# Patient Record
Sex: MALE | Race: BLACK OR AFRICAN AMERICAN | Employment: OTHER | URBAN - METROPOLITAN AREA
[De-identification: names, ages, dates, MRNs, and addresses within clinical notes are randomized per-mention and may not be internally consistent; named-entity substitution may affect disease eponyms.]

---

## 2017-01-13 ENCOUNTER — HOME HEALTH ADMISSION (OUTPATIENT)
Dept: HOME HEALTH SERVICES | Facility: HOME HEALTH | Age: 82
End: 2017-01-13

## 2019-08-07 ENCOUNTER — APPOINTMENT (OUTPATIENT)
Dept: GENERAL RADIOLOGY | Age: 84
DRG: 291 | End: 2019-08-07
Attending: HOSPITALIST
Payer: MEDICARE

## 2019-08-07 ENCOUNTER — APPOINTMENT (OUTPATIENT)
Dept: NON INVASIVE DIAGNOSTICS | Age: 84
DRG: 291 | End: 2019-08-07
Attending: FAMILY MEDICINE
Payer: MEDICARE

## 2019-08-07 ENCOUNTER — APPOINTMENT (OUTPATIENT)
Dept: GENERAL RADIOLOGY | Age: 84
DRG: 291 | End: 2019-08-07
Attending: EMERGENCY MEDICINE
Payer: MEDICARE

## 2019-08-07 ENCOUNTER — HOSPITAL ENCOUNTER (INPATIENT)
Age: 84
LOS: 3 days | Discharge: NURSING, REHAB OR LONG-TERM CARE | DRG: 291 | End: 2019-08-10
Attending: EMERGENCY MEDICINE | Admitting: FAMILY MEDICINE
Payer: MEDICARE

## 2019-08-07 DIAGNOSIS — J44.1 ACUTE EXACERBATION OF CHRONIC OBSTRUCTIVE PULMONARY DISEASE (COPD) (HCC): ICD-10-CM

## 2019-08-07 DIAGNOSIS — J96.02 ACUTE RESPIRATORY FAILURE WITH HYPOXIA AND HYPERCAPNIA (HCC): ICD-10-CM

## 2019-08-07 DIAGNOSIS — I50.9 ACUTE ON CHRONIC CONGESTIVE HEART FAILURE, UNSPECIFIED HEART FAILURE TYPE (HCC): Primary | ICD-10-CM

## 2019-08-07 DIAGNOSIS — J18.9 PNEUMONIA DUE TO INFECTIOUS ORGANISM, UNSPECIFIED LATERALITY, UNSPECIFIED PART OF LUNG: ICD-10-CM

## 2019-08-07 DIAGNOSIS — J96.01 ACUTE RESPIRATORY FAILURE WITH HYPOXIA AND HYPERCAPNIA (HCC): ICD-10-CM

## 2019-08-07 PROBLEM — N28.9 RENAL INSUFFICIENCY: Status: ACTIVE | Noted: 2019-08-07

## 2019-08-07 PROBLEM — N28.9 RENAL INSUFFICIENCY: Status: RESOLVED | Noted: 2019-08-07 | Resolved: 2019-08-07

## 2019-08-07 PROBLEM — J96.90 RESPIRATORY FAILURE (HCC): Status: ACTIVE | Noted: 2019-08-07

## 2019-08-07 PROBLEM — N18.30 CKD (CHRONIC KIDNEY DISEASE) STAGE 3, GFR 30-59 ML/MIN (HCC): Status: ACTIVE | Noted: 2019-08-07

## 2019-08-07 PROBLEM — Z86.73 HISTORY OF CVA (CEREBROVASCULAR ACCIDENT): Status: ACTIVE | Noted: 2019-08-07

## 2019-08-07 PROBLEM — E11.9 TYPE II DIABETES MELLITUS (HCC): Status: ACTIVE | Noted: 2019-08-07

## 2019-08-07 PROBLEM — F03.90 DEMENTIA (HCC): Status: ACTIVE | Noted: 2019-08-07

## 2019-08-07 PROBLEM — N39.0 UTI (URINARY TRACT INFECTION): Status: ACTIVE | Noted: 2019-08-07

## 2019-08-07 PROBLEM — E11.9 TYPE II DIABETES MELLITUS (HCC): Status: RESOLVED | Noted: 2019-08-07 | Resolved: 2019-08-07

## 2019-08-07 LAB
ALBUMIN SERPL-MCNC: 2.6 G/DL (ref 3.4–5)
ALBUMIN/GLOB SERPL: 0.6 {RATIO} (ref 0.8–1.7)
ALP SERPL-CCNC: 109 U/L (ref 45–117)
ALT SERPL-CCNC: 15 U/L (ref 16–61)
ANION GAP SERPL CALC-SCNC: 4 MMOL/L (ref 3–18)
APPEARANCE UR: ABNORMAL
ARTERIAL PATENCY WRIST A: ABNORMAL
AST SERPL-CCNC: 18 U/L (ref 10–38)
ATRIAL RATE: 86 BPM
BACTERIA URNS QL MICRO: ABNORMAL /HPF
BASE EXCESS BLD CALC-SCNC: 5 MMOL/L
BASOPHILS # BLD: 0 K/UL (ref 0–0.1)
BASOPHILS NFR BLD: 0 % (ref 0–2)
BDY SITE: ABNORMAL
BILIRUB SERPL-MCNC: 0.5 MG/DL (ref 0.2–1)
BILIRUB UR QL: ABNORMAL
BNP SERPL-MCNC: 2088 PG/ML (ref 0–1800)
BODY TEMPERATURE: 96.1
BUN SERPL-MCNC: 38 MG/DL (ref 7–18)
BUN/CREAT SERPL: 19 (ref 12–20)
CALCIUM SERPL-MCNC: 8.5 MG/DL (ref 8.5–10.1)
CALCULATED P AXIS, ECG09: -7 DEGREES
CALCULATED R AXIS, ECG10: -24 DEGREES
CALCULATED T AXIS, ECG11: 125 DEGREES
CHLORIDE SERPL-SCNC: 111 MMOL/L (ref 100–111)
CK MB CFR SERPL CALC: 2 % (ref 0–4)
CK MB CFR SERPL CALC: 2.6 % (ref 0–4)
CK MB CFR SERPL CALC: 3.1 % (ref 0–4)
CK MB SERPL-MCNC: 1.1 NG/ML (ref 5–25)
CK MB SERPL-MCNC: 1.4 NG/ML (ref 5–25)
CK MB SERPL-MCNC: 1.7 NG/ML (ref 5–25)
CK SERPL-CCNC: 54 U/L (ref 39–308)
CK SERPL-CCNC: 55 U/L (ref 39–308)
CK SERPL-CCNC: 56 U/L (ref 39–308)
CO2 SERPL-SCNC: 31 MMOL/L (ref 21–32)
COLOR UR: ABNORMAL
CREAT SERPL-MCNC: 1.99 MG/DL (ref 0.6–1.3)
D DIMER PPP FEU-MCNC: 1.61 UG/ML(FEU)
DIAGNOSIS, 93000: NORMAL
DIFFERENTIAL METHOD BLD: ABNORMAL
ECHO AO ROOT DIAM: 4.28 CM
ECHO AV AREA PEAK VELOCITY: 7.6 CM2
ECHO AV AREA VTI: 3.9 CM2
ECHO AV AREA/BSA PEAK VELOCITY: 3.7 CM2/M2
ECHO AV AREA/BSA VTI: 1.9 CM2/M2
ECHO AV MEAN GRADIENT: 2.9 MMHG
ECHO AV PEAK GRADIENT: 1.3 MMHG
ECHO AV PEAK VELOCITY: 57.23 CM/S
ECHO AV VTI: 21.49 CM
ECHO IVC PROX: 1.79 CM
ECHO LA MAJOR AXIS: 4.38 CM
ECHO LA VOL 2C: 73.05 ML (ref 18–58)
ECHO LA VOL 4C: 29.43 ML (ref 18–58)
ECHO LA VOL BP: 51.27 ML (ref 18–58)
ECHO LA VOL/BSA BIPLANE: 25.25 ML/M2 (ref 16–28)
ECHO LA VOLUME INDEX A2C: 35.97 ML/M2 (ref 16–28)
ECHO LA VOLUME INDEX A4C: 14.49 ML/M2 (ref 16–28)
ECHO LV E' LATERAL VELOCITY: 21 CM/S
ECHO LV E' SEPTAL VELOCITY: 16 CM/S
ECHO LV EDV A2C: 81.2 ML
ECHO LV EDV A4C: 68.8 ML
ECHO LV EDV BP: 79.9 ML (ref 67–155)
ECHO LV EDV INDEX A4C: 33.9 ML/M2
ECHO LV EDV INDEX BP: 39.3 ML/M2
ECHO LV EDV NDEX A2C: 40 ML/M2
ECHO LV EJECTION FRACTION A2C: 47 %
ECHO LV EJECTION FRACTION A4C: 29 %
ECHO LV EJECTION FRACTION BIPLANE: 41.7 % (ref 55–100)
ECHO LV ESV A2C: 42.8 ML
ECHO LV ESV A4C: 48.9 ML
ECHO LV ESV BP: 46.6 ML (ref 22–58)
ECHO LV ESV INDEX A2C: 21.1 ML/M2
ECHO LV ESV INDEX A4C: 24.1 ML/M2
ECHO LV ESV INDEX BP: 22.9 ML/M2
ECHO LV INTERNAL DIMENSION DIASTOLIC: 4.03 CM (ref 4.2–5.9)
ECHO LV INTERNAL DIMENSION SYSTOLIC: 2.99 CM
ECHO LV IVSD: 1.15 CM (ref 0.6–1)
ECHO LV MASS 2D: 191.9 G (ref 88–224)
ECHO LV MASS INDEX 2D: 94.5 G/M2 (ref 49–115)
ECHO LV POSTERIOR WALL DIASTOLIC: 1.22 CM (ref 0.6–1)
ECHO LVOT DIAM: 2.54 CM
ECHO LVOT PEAK GRADIENT: 2.9 MMHG
ECHO LVOT PEAK VELOCITY: 85.45 CM/S
ECHO LVOT VTI: 16.54 CM
ECHO MV A VELOCITY: 62.35 CM/S
ECHO MV AREA PHT: 5.6 CM2
ECHO MV E DECELERATION TIME (DT): 136.5 MS
ECHO MV E VELOCITY: 141.3 CM/S
ECHO MV E/A RATIO: 2.27
ECHO MV E/E' LATERAL: 6.73
ECHO MV E/E' RATIO (AVERAGED): 7.78
ECHO MV E/E' SEPTAL: 8.83
ECHO MV PRESSURE HALF TIME (PHT): 39.6 MS
ECHO PV MAX VELOCITY: 131.55 CM/S
ECHO PV PEAK GRADIENT: 6.9 MMHG
ECHO RA AREA 4C: 12.98 CM2
EOSINOPHIL # BLD: 0.1 K/UL (ref 0–0.4)
EOSINOPHIL NFR BLD: 2 % (ref 0–5)
EPITH CASTS URNS QL MICRO: NEGATIVE /LPF (ref 0–5)
ERYTHROCYTE [DISTWIDTH] IN BLOOD BY AUTOMATED COUNT: 14.8 % (ref 11.6–14.5)
GAS FLOW.O2 O2 DELIVERY SYS: ABNORMAL L/MIN
GAS FLOW.O2 SETTING OXYMISER: 15 L/M
GLOBULIN SER CALC-MCNC: 4.4 G/DL (ref 2–4)
GLUCOSE BLD STRIP.AUTO-MCNC: 135 MG/DL (ref 70–110)
GLUCOSE BLD STRIP.AUTO-MCNC: 157 MG/DL (ref 70–110)
GLUCOSE BLD STRIP.AUTO-MCNC: 464 MG/DL (ref 70–110)
GLUCOSE SERPL-MCNC: 101 MG/DL (ref 74–99)
GLUCOSE UR STRIP.AUTO-MCNC: NEGATIVE MG/DL
HBA1C MFR BLD: 7.1 % (ref 4.2–5.6)
HCO3 BLD-SCNC: 30.6 MMOL/L (ref 22–26)
HCT VFR BLD AUTO: 31 % (ref 36–48)
HGB BLD-MCNC: 9.5 G/DL (ref 13–16)
HGB UR QL STRIP: ABNORMAL
KETONES UR QL STRIP.AUTO: NEGATIVE MG/DL
LACTATE BLD-SCNC: 0.51 MMOL/L (ref 0.4–2)
LEUKOCYTE ESTERASE UR QL STRIP.AUTO: ABNORMAL
LIPASE SERPL-CCNC: 52 U/L (ref 73–393)
LYMPHOCYTES # BLD: 0.8 K/UL (ref 0.9–3.6)
LYMPHOCYTES NFR BLD: 10 % (ref 21–52)
MAGNESIUM SERPL-MCNC: 2.3 MG/DL (ref 1.6–2.6)
MCH RBC QN AUTO: 27.9 PG (ref 24–34)
MCHC RBC AUTO-ENTMCNC: 30.6 G/DL (ref 31–37)
MCV RBC AUTO: 91.2 FL (ref 74–97)
MONOCYTES # BLD: 0.5 K/UL (ref 0.05–1.2)
MONOCYTES NFR BLD: 6 % (ref 3–10)
MUCOUS THREADS URNS QL MICRO: ABNORMAL /LPF
NEUTS SEG # BLD: 6.7 K/UL (ref 1.8–8)
NEUTS SEG NFR BLD: 82 % (ref 40–73)
NITRITE UR QL STRIP.AUTO: POSITIVE
O2/TOTAL GAS SETTING VFR VENT: 1 %
P-R INTERVAL, ECG05: 344 MS
PCO2 BLD: 54.6 MMHG (ref 35–45)
PH BLD: 7.35 [PH] (ref 7.35–7.45)
PH UR STRIP: 5.5 [PH] (ref 5–8)
PLATELET # BLD AUTO: 199 K/UL (ref 135–420)
PMV BLD AUTO: 11.2 FL (ref 9.2–11.8)
PO2 BLD: 301 MMHG (ref 80–100)
POTASSIUM SERPL-SCNC: 4.5 MMOL/L (ref 3.5–5.5)
PROT SERPL-MCNC: 7 G/DL (ref 6.4–8.2)
PROT UR STRIP-MCNC: >1000 MG/DL
Q-T INTERVAL, ECG07: 440 MS
QRS DURATION, ECG06: 150 MS
QTC CALCULATION (BEZET), ECG08: 526 MS
RBC # BLD AUTO: 3.4 M/UL (ref 4.7–5.5)
RBC #/AREA URNS HPF: ABNORMAL /HPF (ref 0–5)
SAO2 % BLD: 100 % (ref 92–97)
SERVICE CMNT-IMP: ABNORMAL
SODIUM SERPL-SCNC: 146 MMOL/L (ref 136–145)
SP GR UR REFRACTOMETRY: 1.02 (ref 1–1.03)
SPECIMEN TYPE: ABNORMAL
TOTAL RESP. RATE, ITRR: 26
TROPONIN I SERPL-MCNC: 0.05 NG/ML (ref 0–0.04)
TROPONIN I SERPL-MCNC: 0.06 NG/ML (ref 0–0.04)
TROPONIN I SERPL-MCNC: 0.11 NG/ML (ref 0–0.04)
UROBILINOGEN UR QL STRIP.AUTO: 1 EU/DL (ref 0.2–1)
VENTRICULAR RATE, ECG03: 86 BPM
WBC # BLD AUTO: 8.1 K/UL (ref 4.6–13.2)
WBC URNS QL MICRO: ABNORMAL /HPF (ref 0–5)

## 2019-08-07 PROCEDURE — 83880 ASSAY OF NATRIURETIC PEPTIDE: CPT

## 2019-08-07 PROCEDURE — 92610 EVALUATE SWALLOWING FUNCTION: CPT

## 2019-08-07 PROCEDURE — 74011000258 HC RX REV CODE- 258: Performed by: FAMILY MEDICINE

## 2019-08-07 PROCEDURE — 74011250636 HC RX REV CODE- 250/636: Performed by: EMERGENCY MEDICINE

## 2019-08-07 PROCEDURE — 71045 X-RAY EXAM CHEST 1 VIEW: CPT

## 2019-08-07 PROCEDURE — 74011250636 HC RX REV CODE- 250/636: Performed by: FAMILY MEDICINE

## 2019-08-07 PROCEDURE — 83605 ASSAY OF LACTIC ACID: CPT

## 2019-08-07 PROCEDURE — 93306 TTE W/DOPPLER COMPLETE: CPT

## 2019-08-07 PROCEDURE — 65660000000 HC RM CCU STEPDOWN

## 2019-08-07 PROCEDURE — 82962 GLUCOSE BLOOD TEST: CPT

## 2019-08-07 PROCEDURE — 74011000255 HC RX REV CODE- 255: Performed by: FAMILY MEDICINE

## 2019-08-07 PROCEDURE — 76450000000

## 2019-08-07 PROCEDURE — 74011000258 HC RX REV CODE- 258: Performed by: EMERGENCY MEDICINE

## 2019-08-07 PROCEDURE — 74011250637 HC RX REV CODE- 250/637: Performed by: EMERGENCY MEDICINE

## 2019-08-07 PROCEDURE — 83036 HEMOGLOBIN GLYCOSYLATED A1C: CPT

## 2019-08-07 PROCEDURE — 77010033678 HC OXYGEN DAILY

## 2019-08-07 PROCEDURE — 85025 COMPLETE CBC W/AUTO DIFF WBC: CPT

## 2019-08-07 PROCEDURE — 83690 ASSAY OF LIPASE: CPT

## 2019-08-07 PROCEDURE — 74011000250 HC RX REV CODE- 250: Performed by: FAMILY MEDICINE

## 2019-08-07 PROCEDURE — 74011250637 HC RX REV CODE- 250/637: Performed by: FAMILY MEDICINE

## 2019-08-07 PROCEDURE — 92611 MOTION FLUOROSCOPY/SWALLOW: CPT

## 2019-08-07 PROCEDURE — 83735 ASSAY OF MAGNESIUM: CPT

## 2019-08-07 PROCEDURE — 36600 WITHDRAWAL OF ARTERIAL BLOOD: CPT

## 2019-08-07 PROCEDURE — 77030013140 HC MSK NEB VYRM -A

## 2019-08-07 PROCEDURE — 74011000250 HC RX REV CODE- 250: Performed by: EMERGENCY MEDICINE

## 2019-08-07 PROCEDURE — 96374 THER/PROPH/DIAG INJ IV PUSH: CPT

## 2019-08-07 PROCEDURE — 87086 URINE CULTURE/COLONY COUNT: CPT

## 2019-08-07 PROCEDURE — 81001 URINALYSIS AUTO W/SCOPE: CPT

## 2019-08-07 PROCEDURE — 74011636637 HC RX REV CODE- 636/637: Performed by: FAMILY MEDICINE

## 2019-08-07 PROCEDURE — 77030035694 HC MSK BIPAP FLL FAC PERFMAX PHIL -B

## 2019-08-07 PROCEDURE — 51702 INSERT TEMP BLADDER CATH: CPT

## 2019-08-07 PROCEDURE — 99285 EMERGENCY DEPT VISIT HI MDM: CPT

## 2019-08-07 PROCEDURE — 82550 ASSAY OF CK (CPK): CPT

## 2019-08-07 PROCEDURE — 94660 CPAP INITIATION&MGMT: CPT

## 2019-08-07 PROCEDURE — 5A09357 ASSISTANCE WITH RESPIRATORY VENTILATION, LESS THAN 24 CONSECUTIVE HOURS, CONTINUOUS POSITIVE AIRWAY PRESSURE: ICD-10-PCS | Performed by: FAMILY MEDICINE

## 2019-08-07 PROCEDURE — 80053 COMPREHEN METABOLIC PANEL: CPT

## 2019-08-07 PROCEDURE — 77030034849

## 2019-08-07 PROCEDURE — 82803 BLOOD GASES ANY COMBINATION: CPT

## 2019-08-07 PROCEDURE — 87040 BLOOD CULTURE FOR BACTERIA: CPT

## 2019-08-07 PROCEDURE — 92526 ORAL FUNCTION THERAPY: CPT

## 2019-08-07 PROCEDURE — 85379 FIBRIN DEGRADATION QUANT: CPT

## 2019-08-07 PROCEDURE — 94640 AIRWAY INHALATION TREATMENT: CPT

## 2019-08-07 PROCEDURE — 36415 COLL VENOUS BLD VENIPUNCTURE: CPT

## 2019-08-07 PROCEDURE — 74230 X-RAY XM SWLNG FUNCJ C+: CPT

## 2019-08-07 PROCEDURE — 93005 ELECTROCARDIOGRAM TRACING: CPT

## 2019-08-07 RX ORDER — VANCOMYCIN/0.9 % SOD CHLORIDE 1.5G/250ML
1500 PLASTIC BAG, INJECTION (ML) INTRAVENOUS ONCE
Status: COMPLETED | OUTPATIENT
Start: 2019-08-07 | End: 2019-08-07

## 2019-08-07 RX ORDER — ACETAMINOPHEN 325 MG/1
325 TABLET ORAL
Status: DISCONTINUED | OUTPATIENT
Start: 2019-08-07 | End: 2019-08-10 | Stop reason: HOSPADM

## 2019-08-07 RX ORDER — LEVOFLOXACIN 5 MG/ML
750 INJECTION, SOLUTION INTRAVENOUS
Status: DISCONTINUED | OUTPATIENT
Start: 2019-08-09 | End: 2019-08-07

## 2019-08-07 RX ORDER — IPRATROPIUM BROMIDE AND ALBUTEROL SULFATE 2.5; .5 MG/3ML; MG/3ML
3 SOLUTION RESPIRATORY (INHALATION)
Status: COMPLETED | OUTPATIENT
Start: 2019-08-07 | End: 2019-08-07

## 2019-08-07 RX ORDER — CHOLECALCIFEROL (VITAMIN D3) 125 MCG
5 CAPSULE ORAL
Status: DISCONTINUED | OUTPATIENT
Start: 2019-08-07 | End: 2019-08-10 | Stop reason: HOSPADM

## 2019-08-07 RX ORDER — AMLODIPINE BESYLATE 5 MG/1
5 TABLET ORAL DAILY
COMMUNITY
End: 2019-10-30

## 2019-08-07 RX ORDER — HYDRALAZINE HYDROCHLORIDE 50 MG/1
50 TABLET, FILM COATED ORAL 3 TIMES DAILY
Status: DISCONTINUED | OUTPATIENT
Start: 2019-08-07 | End: 2019-08-10 | Stop reason: HOSPADM

## 2019-08-07 RX ORDER — MONTELUKAST SODIUM 10 MG/1
10 TABLET ORAL DAILY
COMMUNITY

## 2019-08-07 RX ORDER — ALBUTEROL SULFATE 0.83 MG/ML
SOLUTION RESPIRATORY (INHALATION)
COMMUNITY

## 2019-08-07 RX ORDER — MAGNESIUM SULFATE 100 %
16 CRYSTALS MISCELLANEOUS AS NEEDED
Status: DISCONTINUED | OUTPATIENT
Start: 2019-08-07 | End: 2019-08-10 | Stop reason: HOSPADM

## 2019-08-07 RX ORDER — FUROSEMIDE 10 MG/ML
40 INJECTION INTRAMUSCULAR; INTRAVENOUS 2 TIMES DAILY
Status: DISCONTINUED | OUTPATIENT
Start: 2019-08-07 | End: 2019-08-08

## 2019-08-07 RX ORDER — FUROSEMIDE 10 MG/ML
40 INJECTION INTRAMUSCULAR; INTRAVENOUS
Status: COMPLETED | OUTPATIENT
Start: 2019-08-07 | End: 2019-08-07

## 2019-08-07 RX ORDER — LEVOFLOXACIN 5 MG/ML
750 INJECTION, SOLUTION INTRAVENOUS ONCE
Status: COMPLETED | OUTPATIENT
Start: 2019-08-07 | End: 2019-08-07

## 2019-08-07 RX ORDER — ALBUTEROL SULFATE 2.5 MG/.5ML
5 SOLUTION RESPIRATORY (INHALATION)
Status: COMPLETED | OUTPATIENT
Start: 2019-08-07 | End: 2019-08-07

## 2019-08-07 RX ORDER — RANITIDINE 150 MG/1
150 TABLET, FILM COATED ORAL 2 TIMES DAILY
COMMUNITY

## 2019-08-07 RX ORDER — AMLODIPINE BESYLATE 5 MG/1
5 TABLET ORAL DAILY
Status: DISCONTINUED | OUTPATIENT
Start: 2019-08-07 | End: 2019-08-10 | Stop reason: HOSPADM

## 2019-08-07 RX ORDER — HYDRALAZINE HYDROCHLORIDE 25 MG/1
25 TABLET, FILM COATED ORAL 3 TIMES DAILY
Status: DISCONTINUED | OUTPATIENT
Start: 2019-08-07 | End: 2019-08-07

## 2019-08-07 RX ORDER — PREGABALIN 75 MG/1
75 CAPSULE ORAL
COMMUNITY

## 2019-08-07 RX ORDER — FERROUS SULFATE, DRIED 160(50) MG
1 TABLET, EXTENDED RELEASE ORAL
Status: DISCONTINUED | OUTPATIENT
Start: 2019-08-07 | End: 2019-08-10 | Stop reason: HOSPADM

## 2019-08-07 RX ORDER — INSULIN ASPART 100 [IU]/ML
INJECTION, SOLUTION INTRAVENOUS; SUBCUTANEOUS
COMMUNITY
End: 2019-10-30

## 2019-08-07 RX ORDER — MONTELUKAST SODIUM 10 MG/1
10 TABLET ORAL DAILY
Status: DISCONTINUED | OUTPATIENT
Start: 2019-08-07 | End: 2019-08-10 | Stop reason: HOSPADM

## 2019-08-07 RX ORDER — INSULIN GLARGINE 100 [IU]/ML
10 INJECTION, SOLUTION SUBCUTANEOUS
Status: DISCONTINUED | OUTPATIENT
Start: 2019-08-07 | End: 2019-08-08

## 2019-08-07 RX ORDER — FLUTICASONE FUROATE AND VILANTEROL 100; 25 UG/1; UG/1
1 POWDER RESPIRATORY (INHALATION) DAILY
Status: DISCONTINUED | OUTPATIENT
Start: 2019-08-07 | End: 2019-08-07

## 2019-08-07 RX ORDER — ATORVASTATIN CALCIUM 20 MG/1
40 TABLET, FILM COATED ORAL DAILY
Status: DISCONTINUED | OUTPATIENT
Start: 2019-08-07 | End: 2019-08-10 | Stop reason: HOSPADM

## 2019-08-07 RX ORDER — ALFUZOSIN HYDROCHLORIDE 10 MG/1
10 TABLET, EXTENDED RELEASE ORAL DAILY
Status: DISCONTINUED | OUTPATIENT
Start: 2019-08-07 | End: 2019-08-10 | Stop reason: HOSPADM

## 2019-08-07 RX ORDER — PIPERACILLIN SODIUM, TAZOBACTAM SODIUM 3; .375 G/15ML; G/15ML
3.38 INJECTION, POWDER, LYOPHILIZED, FOR SOLUTION INTRAVENOUS
Status: DISCONTINUED | OUTPATIENT
Start: 2019-08-07 | End: 2019-08-07

## 2019-08-07 RX ORDER — DEXTROMETHORPHAN HYDROBROMIDE, GUAIFENESIN 5; 100 MG/5ML; MG/5ML
650 LIQUID ORAL EVERY 8 HOURS
COMMUNITY

## 2019-08-07 RX ORDER — BRIMONIDINE TARTRATE 2 MG/ML
1 SOLUTION/ DROPS OPHTHALMIC 2 TIMES DAILY
Status: DISCONTINUED | OUTPATIENT
Start: 2019-08-07 | End: 2019-08-10 | Stop reason: HOSPADM

## 2019-08-07 RX ORDER — ALFUZOSIN HYDROCHLORIDE 10 MG/1
TABLET, EXTENDED RELEASE ORAL DAILY
COMMUNITY

## 2019-08-07 RX ORDER — ALBUTEROL SULFATE 2.5 MG/.5ML
2.5 SOLUTION RESPIRATORY (INHALATION)
Status: DISCONTINUED | OUTPATIENT
Start: 2019-08-07 | End: 2019-08-10 | Stop reason: HOSPADM

## 2019-08-07 RX ORDER — ISOSORBIDE MONONITRATE 30 MG/1
60 TABLET, EXTENDED RELEASE ORAL DAILY
Status: DISCONTINUED | OUTPATIENT
Start: 2019-08-07 | End: 2019-08-10 | Stop reason: HOSPADM

## 2019-08-07 RX ORDER — HYDRALAZINE HYDROCHLORIDE 50 MG/1
25 TABLET, FILM COATED ORAL 3 TIMES DAILY
COMMUNITY

## 2019-08-07 RX ORDER — GUAIFENESIN 100 MG/5ML
324 LIQUID (ML) ORAL
Status: COMPLETED | OUTPATIENT
Start: 2019-08-07 | End: 2019-08-07

## 2019-08-07 RX ORDER — INSULIN LISPRO 100 [IU]/ML
INJECTION, SOLUTION INTRAVENOUS; SUBCUTANEOUS
Status: DISCONTINUED | OUTPATIENT
Start: 2019-08-07 | End: 2019-08-10 | Stop reason: HOSPADM

## 2019-08-07 RX ORDER — PREGABALIN 75 MG/1
75 CAPSULE ORAL DAILY
Status: DISCONTINUED | OUTPATIENT
Start: 2019-08-07 | End: 2019-08-10 | Stop reason: HOSPADM

## 2019-08-07 RX ORDER — VANCOMYCIN/0.9 % SOD CHLORIDE 1.5G/250ML
1500 PLASTIC BAG, INJECTION (ML) INTRAVENOUS
Status: DISCONTINUED | OUTPATIENT
Start: 2019-08-08 | End: 2019-08-07

## 2019-08-07 RX ORDER — RANITIDINE 150 MG/1
150 TABLET, FILM COATED ORAL 2 TIMES DAILY
Status: DISCONTINUED | OUTPATIENT
Start: 2019-08-07 | End: 2019-08-10 | Stop reason: HOSPADM

## 2019-08-07 RX ORDER — ATORVASTATIN CALCIUM 40 MG/1
40 TABLET, FILM COATED ORAL DAILY
COMMUNITY

## 2019-08-07 RX ADMIN — PIPERACILLIN SODIUM,TAZOBACTAM SODIUM 3.38 G: 3; .375 INJECTION, POWDER, FOR SOLUTION INTRAVENOUS at 18:40

## 2019-08-07 RX ADMIN — PIPERACILLIN SODIUM,TAZOBACTAM SODIUM 3.38 G: 3; .375 INJECTION, POWDER, FOR SOLUTION INTRAVENOUS at 21:30

## 2019-08-07 RX ADMIN — PIPERACILLIN SODIUM,TAZOBACTAM SODIUM 3.38 G: 3; .375 INJECTION, POWDER, FOR SOLUTION INTRAVENOUS at 10:55

## 2019-08-07 RX ADMIN — PIPERACILLIN SODIUM,TAZOBACTAM SODIUM 3.38 G: 3; .375 INJECTION, POWDER, FOR SOLUTION INTRAVENOUS at 05:34

## 2019-08-07 RX ADMIN — BRIMONIDINE TARTRATE 1 DROP: 2 SOLUTION OPHTHALMIC at 08:34

## 2019-08-07 RX ADMIN — ISOSORBIDE MONONITRATE 60 MG: 30 TABLET, EXTENDED RELEASE ORAL at 09:00

## 2019-08-07 RX ADMIN — ASPIRIN 81 MG 324 MG: 81 TABLET ORAL at 06:04

## 2019-08-07 RX ADMIN — BRIMONIDINE TARTRATE 1 DROP: 2 SOLUTION OPHTHALMIC at 21:30

## 2019-08-07 RX ADMIN — APIXABAN 2.5 MG: 2.5 TABLET, FILM COATED ORAL at 09:00

## 2019-08-07 RX ADMIN — BARIUM SULFATE 80 G: 960 POWDER, FOR SUSPENSION ORAL at 12:33

## 2019-08-07 RX ADMIN — FUROSEMIDE 40 MG: 10 INJECTION, SOLUTION INTRAMUSCULAR; INTRAVENOUS at 05:27

## 2019-08-07 RX ADMIN — LEVOFLOXACIN 750 MG: 5 INJECTION, SOLUTION INTRAVENOUS at 08:24

## 2019-08-07 RX ADMIN — VANCOMYCIN HYDROCHLORIDE 1500 MG: 10 INJECTION, POWDER, LYOPHILIZED, FOR SOLUTION INTRAVENOUS at 05:59

## 2019-08-07 RX ADMIN — IPRATROPIUM BROMIDE AND ALBUTEROL SULFATE 3 ML: .5; 3 SOLUTION RESPIRATORY (INHALATION) at 04:50

## 2019-08-07 RX ADMIN — Medication 5 MG: at 21:31

## 2019-08-07 RX ADMIN — RANITIDINE 150 MG: 150 TABLET ORAL at 21:31

## 2019-08-07 RX ADMIN — BARIUM SULFATE 40 ML: 400 SUSPENSION ORAL at 12:33

## 2019-08-07 RX ADMIN — PREGABALIN 75 MG: 75 CAPSULE ORAL at 09:00

## 2019-08-07 RX ADMIN — AMLODIPINE BESYLATE 5 MG: 5 TABLET ORAL at 11:08

## 2019-08-07 RX ADMIN — INSULIN LISPRO 10 UNITS: 100 INJECTION, SOLUTION INTRAVENOUS; SUBCUTANEOUS at 21:31

## 2019-08-07 RX ADMIN — FUROSEMIDE 40 MG: 10 INJECTION, SOLUTION INTRAVENOUS at 18:40

## 2019-08-07 RX ADMIN — HYDRALAZINE HYDROCHLORIDE 50 MG: 50 TABLET, FILM COATED ORAL at 18:40

## 2019-08-07 RX ADMIN — FLUTICASONE FUROATE AND VILANTEROL TRIFENATATE 1 PUFF: 100; 25 POWDER RESPIRATORY (INHALATION) at 08:34

## 2019-08-07 RX ADMIN — APIXABAN 2.5 MG: 2.5 TABLET, FILM COATED ORAL at 21:31

## 2019-08-07 RX ADMIN — HYDRALAZINE HYDROCHLORIDE 50 MG: 50 TABLET, FILM COATED ORAL at 21:31

## 2019-08-07 RX ADMIN — ALBUTEROL SULFATE 5 MG: 2.5 SOLUTION RESPIRATORY (INHALATION) at 04:49

## 2019-08-07 RX ADMIN — METHYLPREDNISOLONE SODIUM SUCCINATE 125 MG: 125 INJECTION, POWDER, FOR SOLUTION INTRAMUSCULAR; INTRAVENOUS at 04:50

## 2019-08-07 RX ADMIN — INSULIN GLARGINE 10 UNITS: 100 INJECTION, SOLUTION SUBCUTANEOUS at 21:40

## 2019-08-07 NOTE — PROGRESS NOTES
Zosyn (Piperacillin/Tazobactam) Extended Infusion    Ezekiel Roman, a 81 y.o. yo male, has been converted to an extended infusion of Zosyn while in the intensive care unit. A loading dose of 3.375 or 4.5 gm will be given over 30 minutes depending on indication. Extended infusions will begin 4 hours after the initial dose if CrCl  >/= 20 ml/min or 8 hours after the initial dose if CrCl < 20 ml/min. Extended infusions will run over 4 hours (240 minutes).     Recent Labs     08/07/19 0435   CREA 1.99*     Ht Readings from Last 1 Encounters:   08/07/19 172.7 cm (68\")       Wt Readings from Last 1 Encounters:   08/07/19 89.4 kg (197 lb)         CrCl : Serum creatinine: 1.99 mg/dL (H) 08/07/19 0435  Estimated creatinine clearance: 31.1 mL/min (A)    Renal adjustment of extended infusion of Zosyn  3.375 or 4.5 gm every 8 hours for CrCl >/= 20 ml/min  3.375 or 4.5 gm every 12 hours for CrCl < 20 ml/min, intermittent HD or PD

## 2019-08-07 NOTE — CDMP QUERY
Patient admitted with SOB /noted to have COPD. If possible, please document in progress notes and d/c summary if you are evaluating and /or treating any of the following: 
 
? COPD exacerbation ? Chronic obstructive asthma with or without status asthmaticus (asthma with COPD) ? Chronic obstructive bronchitis ? Emphysema 
? Other (please specify) ? Unable to determine The medical record reflects the following: 
  Risk Factors: COPD Clinical Indicators: Wheezing , SOB , resp distress. Treatment: duo-nebs, solu-medrol, levaquin Thank-You Umang Mathis RN [address] [place] [address] [phone]

## 2019-08-07 NOTE — DIABETES MGMT
GLYCEMIC CONTROL PROGRESS NOTE:    - known h/o T2DM, HbA1C within recommended range for age + comorbids on basal/MTI SNF regimen  - BG in target range ICU: 140-180 mg/dL  - POCT + - Humalog Normal Insulin Sensitivity Corrective Coverage orders in place recommend continue    Recent Glucose Results:   Lab Results   Component Value Date/Time     (H) 08/07/2019 04:35 AM    GLUCPOC 157 (H) 08/07/2019 11:34 AM    GLUCPOC 135 (H) 08/07/2019 08:27 AM     Che Anderson MS, RN, CDE  Glycemic Control Team  [phone]  Pager [phone] (M-TH 8:00-4:30P)  *After Hours pager [phone]

## 2019-08-07 NOTE — PROGRESS NOTES
0715: Verbal shift change report given to Horacio Avalos (oncoming nurse) by Mariel Miller (offgoing nurse). Report included the following information SBAR, Kardex, ED Summary, Intake/Output, MAR, Recent Results and Cardiac Rhythm NSR w BBB and 1DAVB. 0800: shift assessment completed. See flowsheets. Patient lethargic and oriented x3. Confused regarding date. Intermittent confusion noted. L sided weakness and swelling. Pulses palpable x4. Scrotal edema noted, elevated. Lung sounds with scattered wheezing noted. On 2L NC. HR NSR with a 1st degree heart block and a BBB. ABD benign. Hernandez with orange urine. Resting at this time. Will continue to monitor. 0830: Patient with coughing noted during nursing bedside swallow test on water. Pills held at this time. SLP consult placed. 1000: Patient too lethargic at this time for SLP. Wakes up then falls right back to sleep. Pills still on hold at this time. May need modified swallow d/t hx of dysphasia. 1030: Bed assignment received for 346.   1130: Report given.

## 2019-08-07 NOTE — PROGRESS NOTES
0550-Report received from BRUNO Diaz RN. Sourav, mar, labs, kardex, ED summary and patient status reviewed. Pt to be transferred to ICU 8. 0639-Pt received on 2L/NC. Inspiratory/expiratory wheezes ausculted throughout upper lung fields, diminished bases. Pt is alert and oriented to self. Oriented to room and call bell. CHG Bath completed. Vancomycin currently running, will start Levofloxacin when completed. 0715-Bedside verbal report given to KIKO Thapa RN. Sourav, mar, labs, kardex and patient status reviewed.

## 2019-08-07 NOTE — PROGRESS NOTES
Pharmacy Dosing Services:  Zosyn ( Floor Dosing )    Indication:  Pneumonia ( HAP )    Previous Regimen Zosyn 3.375 gm IV ( over 4 hrs ) q8hrs   Serum Creatinine Lab Results   Component Value Date/Time    Creatinine 1.99 (H) 08/07/2019 04:35 AM      Creatinine Clearance Estimated Creatinine Clearance: 31.1 mL/min (A) (based on SCr of 1.99 mg/dL (H)). BUN Lab Results   Component Value Date/Time    BUN 38 (H) 08/07/2019 04:35 AM         Dose administration notes:   Changed to Zosyn 3.375 gm IV ( over 30 minutes ) q6hrs    Plan:  Continue to monitor     Amanda Licea [facility]   [phone]

## 2019-08-07 NOTE — ED PROVIDER NOTES
EMERGENCY DEPARTMENT HISTORY AND PHYSICAL EXAM    Date: 8/7/2019  Patient Name: Alfredo Horvath    History of Presenting Illness     Chief Complaint   Patient presents with    Respiratory Distress         History Provided By: EMS    Chief Complaint: Shortness of breath respiratory distress  Duration: Hours  Timing:  Acute  Location: chest  Quality: N/A-Patient has no complaints of pain  Severity: Severe  Modifying Factors: No aggravating or alleviating factors  Associated Symptoms: denies any other associated signs or symptoms    Additional History (Context):   4:32 AM  Alfredo Horvath is a 81 y.o. male with PMHX of DM, CVA who presents to the emergency department C/O shortness of breath. Associated sxs include hypoxemia. Pt denies pain, and any other sxs or complaints. Patient is a resident of [address] [place] [address] in [place]. He was found by nursing staff at 3:40 AM this morning in respiratory distress. He was given a nebulizer noted to have low pulse oximetry and EMS was called. PCP: Ashley Ontiveros MD    Current Facility-Administered Medications   Medication Dose Route Frequency Provider Last Rate Last Dose    levoFLOXacin (LEVAQUIN) 750 mg in D5W IVPB  750 mg IntraVENous ONCE Olivia Khalil MD        piperacillin-tazobactam (ZOSYN) 3.375 g in 0.9% sodium chloride (MBP/ADV) 100 mL MBP  3.375 g IntraVENous NOW Olivia Khalil  mL/hr at 08/07/19 0534 3.375 g at 08/07/19 0534    vancomycin (VANCOCIN) 1500 mg in  ml infusion  1,500 mg IntraVENous ONCE Olivia Khalil MD         Current Outpatient Medications   Medication Sig Dispense Refill    albuterol (PROVENTIL VENTOLIN) 2.5 mg /3 mL (0.083 %) nebu by Nebulization route.  alfuzosin SR (UROXATRAL) 10 mg SR tablet Take  by mouth daily.  amLODIPine (NORVASC) 5 mg tablet Take 5 mg by mouth daily.  atorvastatin (LIPITOR) 40 mg tablet Take 40 mg by mouth daily.       apixaban (ELIQUIS) 2.5 mg tablet Take 2.5 mg by mouth two (2) times a day.  hydrALAZINE (APRESOLINE) 50 mg tablet Take 25 mg by mouth three (3) times daily.  pregabalin (LYRICA) 75 mg capsule Take 75 mg by mouth.  insulin aspart U-100 (NOVOLOG) 100 unit/mL injection by SubCUTAneous route Before breakfast, lunch, dinner and at bedtime.  raNITIdine (ZANTAC) 150 mg tablet Take 150 mg by mouth two (2) times a day.  montelukast (SINGULAIR) 10 mg tablet Take 10 mg by mouth daily.  insulin glargine U-300 conc (TOUJEO SOLOSTAR U-300 INSULIN) 300 unit/mL (1.5 mL) inpn pen by SubCUTAneous route daily.  acetaminophen (TYLENOL) 650 mg TbER Take 650 mg by mouth every eight (8) hours. Past History     Past Medical History:  Past Medical History:   Diagnosis Date    CAD (coronary artery disease)     Chronic kidney disease     Dementia     Diabetes ([facility] [address]     Hypertension     Stroke [facility])        Past Surgical History:  History reviewed. No pertinent surgical history. Family History:  History reviewed. No pertinent family history. Social History:  Social History     Tobacco Use    Smoking status: Unknown If Ever Smoked   Substance Use Topics    Alcohol use: Not Currently    Drug use: Not on file       Allergies: Allergies   Allergen Reactions    [place] Juice Unknown (comments)    Pork Derived (Porcine) Unknown (comments)    Tomato Unknown (comments)     Pt unable to answer, dementia           Review of Systems   Review of Systems   Unable to perform ROS: Dementia   Constitutional: Negative for chills and fever. HENT: Negative for congestion and sore throat. Eyes: Negative for pain and redness. Respiratory: Positive for shortness of breath. Negative for cough. Cardiovascular: Positive for leg swelling. Negative for chest pain and palpitations. Gastrointestinal: Negative for abdominal pain, diarrhea, nausea and vomiting. Endocrine: Negative for polydipsia and polyuria.    Genitourinary: Negative for difficulty urinating and dysuria. Musculoskeletal: Negative for back pain and neck pain. Skin: Negative for pallor and rash. Neurological: Positive for weakness. Negative for headaches. All other systems reviewed and are negative. Physical Exam     Vitals:    08/07/19 0450 08/07/19 0454 08/07/19 0455 08/07/19 0527   BP:    158/73   Pulse:    86   Resp:       Temp:       SpO2: 100% 100% 100%    Weight:       Height:         Physical Exam   Constitutional: He appears well-developed and well-nourished. He appears distressed. HENT:   Head: Normocephalic and atraumatic. Right Ear: External ear normal.   Left Ear: External ear normal.   Nose: Nose normal.   Mouth/Throat: Oropharynx is clear and moist.   Eyes: Pupils are equal, round, and reactive to light. Conjunctivae and EOM are normal.   Neck: Normal range of motion. Neck supple. No JVD present. No tracheal deviation present. No thyromegaly present. Cardiovascular: Normal rate, regular rhythm, normal heart sounds and intact distal pulses. Exam reveals no gallop and no friction rub. No murmur heard. Pulmonary/Chest: He is in respiratory distress. He has wheezes. He has rales. Bilateral rails and wheezing   Abdominal: Soft. Bowel sounds are normal. He exhibits no distension and no mass. There is no tenderness. There is no rebound and no guarding. Musculoskeletal: He exhibits edema (2+ bilateral extremity pitting edema). Neurological: He is alert. He has normal reflexes. No cranial nerve deficit. Left hemiparesis   Skin: Skin is warm and dry. No rash noted. Psychiatric: He has a normal mood and affect. His behavior is normal.   Nursing note and vitals reviewed.         Diagnostic Study Results     Labs -     Recent Results (from the past 24 hour(s))   POC LACTIC ACID    Collection Time: 08/07/19  4:34 AM   Result Value Ref Range    Lactic Acid (POC) 0.51 0.40 - 0.41 mmol/L   METABOLIC PANEL, COMPREHENSIVE    Collection Time: 08/07/19  4:35 AM   Result Value Ref Range    Sodium 146 (H) 136 - 145 mmol/L    Potassium 4.5 3.5 - 5.5 mmol/L    Chloride 111 100 - 111 mmol/L    CO2 31 21 - 32 mmol/L    Anion gap 4 3.0 - 18 mmol/L    Glucose 101 (H) 74 - 99 mg/dL    BUN 38 (H) 7.0 - 18 MG/DL    Creatinine 1.99 (H) 0.6 - 1.3 MG/DL    BUN/Creatinine ratio 19 12 - 20      GFR est AA 39 (L) >60 ml/min/1.73m2    GFR est non-AA 32 (L) >60 ml/min/1.73m2    Calcium 8.5 8.5 - 10.1 MG/DL    Bilirubin, total 0.5 0.2 - 1.0 MG/DL    ALT (SGPT) 15 (L) 16 - 61 U/L    AST (SGOT) 18 10 - 38 U/L    Alk. phosphatase 109 45 - 117 U/L    Protein, total 7.0 6.4 - 8.2 g/dL    Albumin 2.6 (L) 3.4 - 5.0 g/dL    Globulin 4.4 (H) 2.0 - 4.0 g/dL    A-G Ratio 0.6 (L) 0.8 - 1.7     CBC WITH AUTOMATED DIFF    Collection Time: 08/07/19  4:35 AM   Result Value Ref Range    WBC 8.1 4.6 - 13.2 K/uL    RBC 3.40 (L) 4.70 - 5.50 M/uL    HGB 9.5 (L) 13.0 - 16.0 g/dL    HCT 31.0 (L) 36.0 - 48.0 %    MCV 91.2 74.0 - 97.0 FL    MCH 27.9 24.0 - 34.0 PG    MCHC 30.6 (L) 31.0 - 37.0 g/dL    RDW 14.8 (H) 11.6 - 14.5 %    PLATELET 220 247 - 108 K/uL    MPV 11.2 9.2 - 11.8 FL    NEUTROPHILS 82 (H) 40 - 73 %    LYMPHOCYTES 10 (L) 21 - 52 %    MONOCYTES 6 3 - 10 %    EOSINOPHILS 2 0 - 5 %    BASOPHILS 0 0 - 2 %    ABS. NEUTROPHILS 6.7 1.8 - 8.0 K/UL    ABS. LYMPHOCYTES 0.8 (L) 0.9 - 3.6 K/UL    ABS. MONOCYTES 0.5 0.05 - 1.2 K/UL    ABS. EOSINOPHILS 0.1 0.0 - 0.4 K/UL    ABS.  BASOPHILS 0.0 0.0 - 0.1 K/UL    DF AUTOMATED     NT-PRO BNP    Collection Time: 08/07/19  4:35 AM   Result Value Ref Range    NT pro-BNP 2,088 (H) 0 - 1,800 PG/ML   D DIMER    Collection Time: 08/07/19  4:35 AM   Result Value Ref Range    D DIMER 1.61 (H) <0.46 ug/ml(FEU)   LIPASE    Collection Time: 08/07/19  4:35 AM   Result Value Ref Range    Lipase 52 (L) 73 - 393 U/L   MAGNESIUM    Collection Time: 08/07/19  4:35 AM   Result Value Ref Range    Magnesium 2.3 1.6 - 2.6 mg/dL   CARDIAC PANEL,(CK, CKMB & TROPONIN)    Collection Time: 08/07/19  4:35 AM Result Value Ref Range    CK 56 39 - 308 U/L    CK - MB 1.1 <3.6 ng/ml    CK-MB Index 2.0 0.0 - 4.0 %    Troponin-I, QT 0.05 (H) 0.0 - 0.045 NG/ML   POC G3    Collection Time: 08/07/19  4:41 AM   Result Value Ref Range    Device: Non rebreather      Flow rate (POC) 15 L/M    FIO2 (POC) 1.0 %    pH (POC) 7.351 7.35 - 7.45      pCO2 (POC) 54.6 (H) 35.0 - 45.0 MMHG    pO2 (POC) 301 (H) 80 - 100 MMHG    HCO3 (POC) 30.6 (H) 22 - 26 MMOL/L    sO2 (POC) 100 (H) 92 - 97 %    Base excess (POC) 5 mmol/L    Allens test (POC) N/A      Total resp.  rate 26      Site RIGHT RADIAL      Patient temp. 96.1      Specimen type (POC) ARTERIAL      Performed by Linda Patel    EKG, 12 LEAD, INITIAL    Collection Time: 08/07/19  4:41 AM   Result Value Ref Range    Ventricular Rate 86 BPM    Atrial Rate 86 BPM    P-R Interval 344 ms    QRS Duration 150 ms    Q-T Interval 440 ms    QTC Calculation (Bezet) 526 ms    Calculated P Axis -7 degrees    Calculated R Axis -24 degrees    Calculated T Axis 125 degrees    Diagnosis       Sinus rhythm with sinus arrhythmia with 1st degree AV block  Left bundle branch block  Abnormal ECG  No previous ECGs available     URINALYSIS W/ RFLX MICROSCOPIC    Collection Time: 08/07/19  5:20 AM   Result Value Ref Range    Color DARK YELLOW      Appearance CLOUDY      Specific gravity 1.022 1.005 - 1.030      pH (UA) 5.5 5.0 - 8.0      Protein >1,000 (A) NEG mg/dL    Glucose NEGATIVE  NEG mg/dL    Ketone NEGATIVE  NEG mg/dL    Bilirubin SMALL (A) NEG      Blood MODERATE (A) NEG      Urobilinogen 1.0 0.2 - 1.0 EU/dL    Nitrites POSITIVE (A) NEG      Leukocyte Esterase TRACE (A) NEG     URINE MICROSCOPIC ONLY    Collection Time: 08/07/19  5:20 AM   Result Value Ref Range    WBC 5 to 10 0 - 5 /hpf    RBC TOO NUMEROUS TO COUNT 0 - 5 /hpf    Epithelial cells NEGATIVE  0 - 5 /lpf    Bacteria 1+ (A) NEG /hpf    Mucus FEW (A) NEG /lpf       Radiologic Studies -  XR CHEST PORT   Final Result   Impression: --------------      There is apparent pulmonary vascular congestion, diffuse increased markings and   bilateral mid to lower lung field infiltrates which suggests interstitial and   pulmonary edema. Bilateral pneumonia is also a possibility. Correlation is   needed. CT Results  (Last 48 hours)    None        CXR Results  (Last 48 hours)               08/07/19 0514  XR CHEST PORT Final result    Impression:  Impression:   --------------       There is apparent pulmonary vascular congestion, diffuse increased markings and   bilateral mid to lower lung field infiltrates which suggests interstitial and   pulmonary edema. Bilateral pneumonia is also a possibility. Correlation is   needed. Narrative:  ---------------------------------------------------------------------------   <<<<<<<<<           [facility]  [facility]           >>>>>>>>>    ---------------------------------------------------------------------------       CLINICAL HISTORY:  Shortness of breath. COMPARISON EXAMINATIONS:  None. ---  SINGLE FRONTAL VIEW OF THE CHEST  ---       The lung volumes are low. The cardiomediastinal silhouette is grossly within   normal limits. There is apparent pulmonary vascular congestion, diffuse   increased markings and bilateral mid to lower lung field infiltrates.  No   significant osseous abnormalities are identified.             --------------             Medications given in the ED-  Medications   levoFLOXacin (LEVAQUIN) 750 mg in D5W IVPB (has no administration in time range)   piperacillin-tazobactam (ZOSYN) 3.375 g in 0.9% sodium chloride (MBP/ADV) 100 mL MBP (3.375 g IntraVENous New Bag 8/7/19 0536)   vancomycin (VANCOCIN) 1500 mg in  ml infusion (has no administration in time range)   albuterol CONCENTRATE 2.5mg/0.5 mL neb soln (5 mg Nebulization Given 8/7/19 3629)   albuterol-ipratropium (DUO-NEB) 2.5 MG-0.5 MG/3 ML (3 mL Nebulization Given 8/7/19 7758) methylPREDNISolone (PF) (Solu-MEDROL) injection 125 mg (125 mg IntraVENous Given 8/7/19 1310)   furosemide (LASIX) injection 40 mg (40 mg IntraVENous Given 8/7/19 4286)         Medical Decision Making   I am the first provider for this patient. I reviewed the vital signs, available nursing notes, past medical history, past surgical history, family history and social history. Vital Signs-Reviewed the patient's vital signs. EKG interpretation: (Preliminary)  04:41  Normal sinus rhythm @ 86 with first-degree AV block and left bundle branch block  ECG read by Ariadna Arechiga MD    Records Reviewed: Nursing Notes    Provider Notes (Medical Decision Making):   DDx-COPD exacerbation, asthma exacerbation, CHF exacerbation, respiratory failure, pneumonia, ACS, PE    Procedures:  Procedures    ED Course:   Initial assessment performed. The patients presenting problems have been discussed, and they are in agreement with the care plan formulated and outlined with them. I have encouraged them to ask questions as they arise throughout their visit. 05:10  Case discussed with hospitalist Dr. Celestina Ballesteros who agrees with admission to ICU      Diagnosis and Disposition   DISCUSSION:  The patient was in respiratory distress on presentation. ABG showed hypoxemic hypercapnic respiratory failure. He was given DuoNeb and albuterol nebulizers and Solu-Medrol with minimal improvement. Chest x-ray showed CHF and possible bilateral pneumonia. Patient was placed on BiPAP. Blood cultures were drawn and the patient was given Zosyn and Levaquin and vancomycin for HCAP. Patient was given Lasix IV for CHF. Hernandez was placed. ECG showed left bundle branch block pattern. Troponin slightly elevated 0.05. BNP elevated consistent with CHF. Labs remarkable for elevated BUN and creatinine, anemia. UA is orange from Pyridium. Urinalysis is unreliable. Case discussed with hospitalist who agreed with admission to ICU.   Hospitalist agreed to consult intensivist.    Critical Care Time: 38 minutes    5:10 AM  I have spent 38 minutes of critical care time involved in treatment of respiratory failure with BiPAP, CHF, lab review, consultations with specialist, medical decision-making, and documentation. During this entire length of time I was immediately available to the patient. Critical Care: The reason for providing this level of medical care for this critically ill patient was due a critical illness that impaired one or more vital organ systems such that there was a high probability of imminent or life threatening deterioration in the patients condition. This care involved high complexity decision making to assess, manipulate, and support vital system functions, to treat this degreee vital organ system failure and to prevent further life threatening deterioration of the patients condition. Core Measures:  For Hospitalized Patients:    1. Hospitalization Decision Time:  The decision to hospitalize the patient was made by VIANEY Norton at 05:05 on 8/7/2019    2. Aspirin: Aspirin was given    5:15 AM  Patient is being admitted to the hospital by  [facility]. The results of their tests and reasons for their admission have been discussed with them and/or available family. They convey agreement and understanding for the need to be admitted and for their admission diagnosis. CONDITIONS ON ADMISSION:  Sepsis is not present at the time of admission. Deep Vein Thrombosis is not present at the time of admission. Thrombosis is not present at the time of admission. Urinary Tract Infection is not present at the time of admission. Pneumonia is present at the time of admission. MRSA is not present at the time of admission. Wound infection is not present at the time of admission. Pressure Ulcer is not present at the time of admission. CLINICAL IMPRESSION:    1. Acute on chronic congestive heart failure, unspecified heart failure type ([address]    2. Acute exacerbation of chronic obstructive pulmonary disease (COPD) ([facility] [address]    3. Pneumonia due to infectious organism, unspecified laterality, unspecified part of lung    4. Acute respiratory failure with hypoxia and hypercapnia (HCC)        PLAN:  1. Admission to ICU        Please note that this dictation was completed with [employer], the computer voice recognition software. Quite often unanticipated grammatical, syntax, homophones, and other interpretive errors are inadvertently transcribed by the computer software. Please disregard these errors. Please excuse any errors that have escaped final proofreading.

## 2019-08-07 NOTE — PROGRESS NOTES
Speech Therapy Note:    SLP orders received and attempted however, patient:      [x]  Lethargic, unable to maintain alertness enough for safe PO intake  []  Refused participation  []  Off the unit  []  NPO for procedure  []  Other:     SLP will f/u later this day or as medically indicated. D/w RN, CHEO ECHEVARRIA.  Thank you for this referral.     Son Jesus M.S., [address] [facility]  Speech Language Pathologist

## 2019-08-07 NOTE — PROGRESS NOTES
Bedside and Verbal shift change report given to Mohamud Del Valle RN (oncoming nurse) by Brittney Woods RN   (offgoing nurse). Report included the following information SBAR, Kardex, ED Summary, Procedure Summary, Intake/Output, MAR, Recent Results, Med Rec Status, Cardiac Rhythm SR with BBB and Alarm Parameters .

## 2019-08-07 NOTE — PROGRESS NOTES
Pharmacy Dosing Services: Vancomycin    Consult for Vancomycin Dosing by Pharmacy by Dr. Dora James provided for this 81y.o. year old male , for indication of HAP (7 days). Day of Therapy 1    Ht Readings from Last 1 Encounters:   08/07/19 172.7 cm (68\")        Wt Readings from Last 1 Encounters:   08/07/19 89.4 kg (197 lb)        Other Current Antibiotics Levaquin + Zosyn    Significant Cultures Pending    Serum Creatinine Lab Results   Component Value Date/Time    Creatinine 1.99 (H) 08/07/2019 04:35 AM        Creatinine Clearance Estimated Creatinine Clearance: 31.1 mL/min (A) (based on SCr of 1.99 mg/dL (H)). BUN Lab Results   Component Value Date/Time    BUN 38 (H) 08/07/2019 04:35 AM        WBC Lab Results   Component Value Date/Time    WBC 8.1 08/07/2019 04:35 AM        H/H Lab Results   Component Value Date/Time    HGB 9.5 (L) 08/07/2019 04:35 AM        Platelets Lab Results   Component Value Date/Time    PLATELET 536 18/69/5533 04:35 AM        Temp 96.1 °F (35.6 °C)     Start Vancomycin therapy, with 1500 mg at 0600 8/7/19. Every 36 hours. Dose calculated to approximate a therapeutic trough of 15-20 mcg/mL. Pharmacy to follow daily and will make changes to dose and/or frequency based on clinical status.     Pharmacist [address] [place]

## 2019-08-07 NOTE — ROUTINE PROCESS
TRANSFER - OUT REPORT: 
 
Verbal report given to Caron Favre RN on [employer]  being transferred to ICU(unit) for routine progression of care Report consisted of patients Situation, Background, Assessment and  
Recommendations(SBAR). Information from the following report(s) ED Summary was reviewed with the receiving nurse. Lines:  
Peripheral IV 08/07/19 Right Antecubital (Active) Site Assessment Clean, dry, & intact 8/7/2019  4:40 AM  
Phlebitis Assessment 0 8/7/2019  4:40 AM  
Infiltration Assessment 0 8/7/2019  4:40 AM  
Dressing Status Clean, dry, & intact 8/7/2019  4:40 AM  
Dressing Type 4 X 4 8/7/2019  4:40 AM  
Hub Color/Line Status Pink 8/7/2019  4:40 AM  
Alcohol Cap Used Yes 8/7/2019  4:40 AM  
  
 
Opportunity for questions and clarification was provided. Patient transported with: 
 Monitor Bipap RN

## 2019-08-07 NOTE — PROGRESS NOTES
Reason for Admission:   Respiratory distress                  RRAT Score:    14              Do you (patient/family) have any concerns for transition/discharge? Not at this time                Plan for utilizing home health:   no    Current Advanced Directive/Advance Care Plan:  Not on chart, palliative consult has been ordered            Transition of Care Plan:   Chart reviewed and visited pt at bedside no family present spoke with patients son Ethan Osorio and daughter Alba Nogueira via phone conversation due to pt unable to appropriately have conversation hx includes dementia,cva,DM,CHF,COPD. Cm role as discharge planner explained to family, when discharged family would like pt to return back to [address] where he is an long term resident, [address] completed, [facility] not needed for pt to return back to facility per [facility]'s Liason Yosef. Care Management Interventions  PCP Verified by CM:  Yes  Palliative Care Criteria Met (RRAT>21 & CHF Dx)?: No  Mode of Transport at Discharge: S  Transition of Care Consult (CM Consult): SNF, Long Term Care  Speech Therapy Consult: Yes  Current Support Network: Nursing Facility  Confirm Follow Up Transport: Family  Plan discussed with Pt/Family/Caregiver: Yes(son)  Freedom of Choice Offered: Yes  Discharge Location  Discharge Placement: [facility]

## 2019-08-07 NOTE — PROGRESS NOTES
Problem: Dysphagia (Adult)  Goal: *Acute Goals and Plan of Care (Insert Text)  Description  Recommendations:  Diet: Puree/thin  Meds: Crushed in puree  Aspiration Precautions  Oral Care TID    Goals:  Patient will:  1. Tolerate PO trials with 0 s/s overt distress in 4/5 trials  2. Utilize compensatory swallow strategies/maneuvers (decrease bite/sip, size/rate, alt. liq/sol) with min cues in 4/5 trials  3. Perform oral-motor/laryngeal exercises to increase oropharyngeal swallow function with min cues  4. Complete an objective swallow study (i.e., MBSS) to assess swallow integrity, r/o aspiration, and determine of safest LRD, min A - goal met 8/7 8/7/2019 1306 by Everardo Liang, SLP  Outcome: Progressing Towards Goal    SPEECH PATHOLOGY MODIFIED BARIUM SWALLOW STUDY & TREATMENT    Patient: Nirmala Chung (41 y.o. male)  Date: 8/7/2019  Primary Diagnosis: Acute exacerbation of CHF (congestive heart failure) ([facility]) [I50.9]  Respiratory failure ([facility] [address] [J96.90]        Precautions: Aspiration     PLOF: SNF    ASSESSMENT :  Modified barium swallow study completed without aspiration across all consistency trials, to include; thin liquid +/- straw, nectar-thick liquid +/- straw, puree and solid. Patient demo flash penetration across all liquid trials, with penetrated material cleared spontaneously from the airway each time. Puree and solids; demo swallow delay and premature spillage to the vallecula. Solids; demo significantly delayed mastication. Based on the objective data described below, the patient presents with moderate oral dysphagia, which places patient at risk for aspiration. Patient safest for puree/thin liquid diet with aspiration precautions, 1:1 supervision with PO. Pills should be presented crushed in puree. D/w RN, Gold Hernandez.      Treatment:  Skilled therapy initiated: Educated pt on MBS results, aspiration precautions, and importance of compensatory swallow techniques to decrease aspiration risk (decrease rate of intake & sip/bite size, upright @HOB for all po intake and ~30 minutes after po); verbalized comprehension, requires reinforcement. SLP to follow as indicated. Patient will benefit from skilled intervention to address the above impairments. Patient's rehabilitation potential is considered to be Fair  Factors which may influence rehabilitation potential include:   ? None noted  ? Mental ability/status  ? Medical condition  ? Home/family situation and support systems  ? Safety awareness  ? Pain tolerance/management  ? Other:      PLAN :  Recommendations and Planned Interventions:  Puree/thin   Frequency/Duration: Patient will be followed by speech-language pathology 1-2 times per day/4-7 days per week to address goals. Discharge Recommendations: Skilled Nursing Facility     SUBJECTIVE:   Patient stated This isn't good. OBJECTIVE:     Past Medical History:   Diagnosis Date    CAD (coronary artery disease)     Chronic kidney disease     Dementia     Diabetes ([facility] [address]     Hypertension     Stroke [facility])    History reviewed. No pertinent surgical history. Home Situation:      Diet prior to admission: Unknown  Current Diet:  Puree/thin liquid   Radiologist: HRRA  Film Views: Lateral  Patient Position: Chair 90    Trial 1:   Consistency Presented: Nectar thick liquid; Thin liquid;Puree; Solid   How Presented: SLP-fed/presented;Cup/sip;Straw;Successive swallows       Bolus Acceptance: No impairment   Bolus Formation/Control: Impaired: Delayed;Mastication   Propulsion: Delayed (# of seconds)   Oral Residue: Lingual   Initiation of Swallow: Triggered at vallecula   Timing: Pooling 1-5 sec;Vallecular   Penetration: Flash/transient;During swallow   Aspiration/Timing: No evidence of aspiration   Pharyngeal Clearance: No residue   Attempted Modifications: Small sips and bites   Effective Modifications: Small sips and bites   Cues for Modifications: Minimal         Decreased Tongue Base Retraction?: Yes  Laryngeal Elevation: WFL (within functional limits)  Aspiration/Penetration Score: 2 (Penetration/No residue-Contrast enters the airway penetrates, remains above the folds/cords, and is cleared)  Pharyngeal Symmetry: Not assessed  Pharyngeal-Esophageal Segment: No impairment  Pharyngeal Dysfunction: Decreased tongue base retraction    Oral Phase Severity: Moderate  Pharyngeal Phase Severity: N/A    8-point Penetration-Aspiration Scale: Score 2    PAIN:  Pain level pre-treatment: 0/10   Pain level post-treatment: 0/10     COMMUNICATION/EDUCATION:   ?  Patient educated regarding MBS results and diet recommendations. ?  Patient/family have participated as able in goal setting and plan of care. ?  Patient/family agree to work toward stated goals and plan of care. ?  Patient understands intent and goals of therapy, but is neutral about his/her participation. ? Patient is unable to participate in goal setting and plan of care.     Thank you for this referral.  Gabriele Bello SLP  Time Calculation: 30 mins  MBS Time: 20 minutes  Treatment Time: 10 minutes

## 2019-08-07 NOTE — PROGRESS NOTES
TRANSFER - OUT REPORT:    Verbal report given to Nila (name) on Alma Pickup  being transferred to  (unit) for routine progression of care       Report consisted of patients Situation, Background, Assessment and   Recommendations(SBAR). Information from the following report(s) SBAR, Kardex, ED Summary, Intake/Output, MAR and Recent Results was reviewed with the receiving nurse. Lines:   Peripheral IV 08/07/19 Right Antecubital (Active)   Site Assessment Clean, dry, & intact 8/7/2019  8:00 AM   Phlebitis Assessment 0 8/7/2019  8:00 AM   Infiltration Assessment 0 8/7/2019  8:00 AM   Dressing Status Clean, dry, & intact 8/7/2019  8:00 AM   Dressing Type Transparent 8/7/2019  8:00 AM   Hub Color/Line Status Infusing 8/7/2019  8:00 AM   Action Taken Open ports on tubing capped 8/7/2019  8:00 AM   Alcohol Cap Used No 8/7/2019  8:00 AM        Opportunity for questions and clarification was provided.       Patient transported with:   Monitor  O2 @ 2 liters  Registered Nurse

## 2019-08-07 NOTE — CONSULTS
[facility]  Pulmonary, Critical Care, and Sleep Medicine    Name: Cary Umana MRN: [id number]   : 1935 Hospital: [facility] [place]    Date: 2019  Room: [address] [place] [address] Note                                              Consult requesting physician: Dr. Lorren Shone  Reason for Consult: acute respiratory failure requiring bipap    Subjective/History of Present Illness:     Patient is a 81 y.o. male with PMHx significant for HTN, DM, CAD, CKD, CVA, dementia, nursing home resident admitted with CHF exacerbation leading to acute respiratory failure requiring BiPAP, possible UTI.     2019:  [facility] resident. Transferred to hospital for respiratory distress and hypoxia, required bipap. Leg edema. cxr suggestive of CHF  Elevated BNP. Received lasix and admitted to icu on bipap. Used bipap initially, now on 2 lpm o2 which is his baseline at [facility]  UA abnormal. Normal lactate. On empiric abx. No fever  BP stable  Dyspnea and dry cough improved. Hx limited due to dementia. UOP last 24 hrs: 1000 ml  I/O last 24 hrs: -250 ml    The patient is critically ill and can not provide additional history due to dementia    History taken from  EMR     Review of Systems:  ROS not obtained due to patient factor. Allergies   Allergen Reactions    Hanover Juice Unknown (comments)    Pork Derived (Porcine) Unknown (comments)    Tomato Unknown (comments)     Pt unable to answer, dementia        Past Medical History:   Diagnosis Date    CAD (coronary artery disease)     Chronic kidney disease     Dementia     [facility] [address]     Hypertension     Stroke ([facility] [address]       History reviewed. No pertinent surgical history. Social History     Tobacco Use    Smoking status: Unknown If Ever Smoked   Substance Use Topics    Alcohol use: Not Currently      History reviewed. No pertinent family history. Prior to Admission medications    Medication Sig Start Date End Date Taking? Authorizing Provider   albuterol (PROVENTIL VENTOLIN) 2.5 mg /3 mL (0.083 %) nebu by Nebulization route. Yes Lanette, MD Michael   alfuzosin SR (UROXATRAL) 10 mg SR tablet Take  by mouth daily. Yes Michael Gama MD   amLODIPine (NORVASC) 5 mg tablet Take 5 mg by mouth daily. Yes Michael Gama MD   atorvastatin (LIPITOR) 40 mg tablet Take 40 mg by mouth daily. Yes Michael Gama MD   apixaban (ELIQUIS) 2.5 mg tablet Take 2.5 mg by mouth two (2) times a day. Yes Michael Gama MD   hydrALAZINE (APRESOLINE) 50 mg tablet Take 25 mg by mouth three (3) times daily. Yes Michael Gama MD   pregabalin (LYRICA) 75 mg capsule Take 75 mg by mouth. Yes Michael Gama MD   insulin aspart U-100 (NOVOLOG) 100 unit/mL injection by SubCUTAneous route Before breakfast, lunch, dinner and at bedtime. Yes Michael Gama MD   raNITIdine (ZANTAC) 150 mg tablet Take 150 mg by mouth two (2) times a day. Yes Michael Gama MD   montelukast (SINGULAIR) 10 mg tablet Take 10 mg by mouth daily. Yes Michael Gama MD   insulin glargine U-300 conc (TOUJEO SOLOSTAR U-300 INSULIN) 300 unit/mL (1.5 mL) inpn pen by SubCUTAneous route daily. Yes Michael Gama MD   acetaminophen (TYLENOL) 650 mg TbER Take 650 mg by mouth every eight (8) hours.     Lanette, MD Michael     Current Facility-Administered Medications   Medication Dose Route Frequency    [START ON 8/9/2019] levoFLOXacin (LEVAQUIN) 750 mg in D5W IVPB  750 mg IntraVENous Q48H    insulin lispro (HUMALOG) injection   SubCUTAneous AC&HS    piperacillin-tazobactam (ZOSYN) 3.375 g in 0.9% sodium chloride (MBP/ADV) 100 mL MBP  3.375 g IntraVENous Q8H    alfuzosin SR (UROXATRAL) tablet 10 mg  10 mg Oral DAILY    amLODIPine (NORVASC) tablet 5 mg  5 mg Oral DAILY    apixaban (ELIQUIS) tablet 2.5 mg  2.5 mg Oral BID    atorvastatin (LIPITOR) tablet 40 mg  40 mg Oral DAILY    montelukast (SINGULAIR) tablet 10 mg  10 mg Oral DAILY    pregabalin (LYRICA) capsule 75 mg  75 mg Oral DAILY    raNITIdine (ZANTAC) tablet 150 mg  150 mg Oral BID    hydrALAZINE (APRESOLINE) tablet 50 mg  50 mg Oral TID    fluticasone-vilanterol (BREO ELLIPTA) 100mcg-25mcg/puff  1 Puff Inhalation DAILY    isosorbide mononitrate ER (IMDUR) tablet 60 mg  60 mg Oral DAILY    brimonidine (ALPHAGAN) 0.2 % ophthalmic solution 1 Drop  1 Drop Both Eyes BID    melatonin tablet 5 mg  5 mg Oral QHS    calcium-vitamin D (OS-JOANA) 500 mg-200 unit tablet  1 Tab Oral DAILY WITH BREAKFAST    Vancomycin- Pharmacy to dose   1 Each Other Rx Dosing/Monitoring    [START ON 2019] vancomycin (VANCOCIN) 1500 mg in  ml infusion  1,500 mg IntraVENous Q36H    furosemide (LASIX) injection 40 mg  40 mg IntraVENous BID    perflutren lipid microspheres (DEFINITY) in NS bolus IV  1 mL IntraVENous RAD ONCE         Objective:   Vital Signs:    Visit Vitals  /69   Pulse 85   Temp 96.7 °F (35.9 °C)   Resp 19   Ht 5' 8\" (1.727 m)   Wt 89.4 kg (197 lb)   SpO2 97%   BMI 29.95 kg/m²       O2 Device: Nasal cannula   O2 Flow Rate (L/min): 2 l/min   Temp (24hrs), Av.3 °F (35.7 °C), Min:96.1 °F (35.6 °C), Max:96.7 °F (35.9 °C)       Intake/Output:   Last shift:       0701 -  1900  In: 750 [I.V.:750]  Out: 1000 [Urine:1000]    Last 3 shifts: No intake/output data recorded. Intake/Output Summary (Last 24 hours) at 2019 1012  Last data filed at 2019 0845  Gross per 24 hour   Intake 750 ml   Output 1000 ml   Net -250 ml       Last 3 Recorded Weights in this Encounter    19 0429 19 0806   Weight: 89.4 kg (197 lb) 89.4 kg (197 lb)         Recent Labs     19  0441   PHI 7.351   PCO2I 54.6*   PO2I 301*   HCO3I 30.6*   FIO2I 1.0       Physical Exam:     General/Neurology: Alert, Awake, NAD. Baseline dementia. Left weakness due to old CVA  Head:   Normocephalic, without obvious abnormality, atraumatic. Eye:   EOM intact, no scleral icterus, no pallor, no cyanosis.   Throat:  Lips, mucosa, and tongue normal. No oral thrush. Neck:   Supple, symmetric. No lymphadenopathy. Trachea midline  Lung: Moderate air entry bilateral equal.bibasilar scattered crepitus +. No rhonchi. No wheezing. No stridors. No prolongded expiration. No accessory muscle use. Heart:   S1 S2 present. No murmur. No JVD. Abdomen:  Soft. NT. ND. +BS. Extremities:  1-2+ pitting b/l pedal edema. No cyanosis. No clubbing. Pulses: 2+ and symmetric in DP. Capillary refill: normal  Lymphatic:  No cervical or supraclavicular palpable lymphadenopathy. Musculoskeletal: No joint swelling. No tenderness. Skin:   Color, texture, turgor normal. No rashes or lesions. Data:       Recent Results (from the past 24 hour(s))   POC LACTIC ACID    Collection Time: 08/07/19  4:34 AM   Result Value Ref Range    Lactic Acid (POC) 0.51 0.40 - 4.92 mmol/L   METABOLIC PANEL, COMPREHENSIVE    Collection Time: 08/07/19  4:35 AM   Result Value Ref Range    Sodium 146 (H) 136 - 145 mmol/L    Potassium 4.5 3.5 - 5.5 mmol/L    Chloride 111 100 - 111 mmol/L    CO2 31 21 - 32 mmol/L    Anion gap 4 3.0 - 18 mmol/L    Glucose 101 (H) 74 - 99 mg/dL    BUN 38 (H) 7.0 - 18 MG/DL    Creatinine 1.99 (H) 0.6 - 1.3 MG/DL    BUN/Creatinine ratio 19 12 - 20      GFR est AA 39 (L) >60 ml/min/1.73m2    GFR est non-AA 32 (L) >60 ml/min/1.73m2    Calcium 8.5 8.5 - 10.1 MG/DL    Bilirubin, total 0.5 0.2 - 1.0 MG/DL    ALT (SGPT) 15 (L) 16 - 61 U/L    AST (SGOT) 18 10 - 38 U/L    Alk.  phosphatase 109 45 - 117 U/L    Protein, total 7.0 6.4 - 8.2 g/dL    Albumin 2.6 (L) 3.4 - 5.0 g/dL    Globulin 4.4 (H) 2.0 - 4.0 g/dL    A-G Ratio 0.6 (L) 0.8 - 1.7     CBC WITH AUTOMATED DIFF    Collection Time: 08/07/19  4:35 AM   Result Value Ref Range    WBC 8.1 4.6 - 13.2 K/uL    RBC 3.40 (L) 4.70 - 5.50 M/uL    HGB 9.5 (L) 13.0 - 16.0 g/dL    HCT 31.0 (L) 36.0 - 48.0 %    MCV 91.2 74.0 - 97.0 FL    MCH 27.9 24.0 - 34.0 PG    MCHC 30.6 (L) 31.0 - 37.0 g/dL    RDW 14.8 (H) 11.6 - 14.5 %    PLATELET 189 715 - 420 K/uL    MPV 11.2 9.2 - 11.8 FL    NEUTROPHILS 82 (H) 40 - 73 %    LYMPHOCYTES 10 (L) 21 - 52 %    MONOCYTES 6 3 - 10 %    EOSINOPHILS 2 0 - 5 %    BASOPHILS 0 0 - 2 %    ABS. NEUTROPHILS 6.7 1.8 - 8.0 K/UL    ABS. LYMPHOCYTES 0.8 (L) 0.9 - 3.6 K/UL    ABS. MONOCYTES 0.5 0.05 - 1.2 K/UL    ABS. EOSINOPHILS 0.1 0.0 - 0.4 K/UL    ABS. BASOPHILS 0.0 0.0 - 0.1 K/UL    DF AUTOMATED     NT-PRO BNP    Collection Time: 08/07/19  4:35 AM   Result Value Ref Range    NT pro-BNP 2,088 (H) 0 - 1,800 PG/ML   D DIMER    Collection Time: 08/07/19  4:35 AM   Result Value Ref Range    D DIMER 1.61 (H) <0.46 ug/ml(FEU)   CULTURE, BLOOD    Collection Time: 08/07/19  4:35 AM   Result Value Ref Range    Special Requests: NO SPECIAL REQUESTS      Culture result: NO GROWTH AFTER 1 HOUR     LIPASE    Collection Time: 08/07/19  4:35 AM   Result Value Ref Range    Lipase 52 (L) 73 - 393 U/L   MAGNESIUM    Collection Time: 08/07/19  4:35 AM   Result Value Ref Range    Magnesium 2.3 1.6 - 2.6 mg/dL   CARDIAC PANEL,(CK, CKMB & TROPONIN)    Collection Time: 08/07/19  4:35 AM   Result Value Ref Range    CK 56 39 - 308 U/L    CK - MB 1.1 <3.6 ng/ml    CK-MB Index 2.0 0.0 - 4.0 %    Troponin-I, QT 0.05 (H) 0.0 - 0.045 NG/ML   HEMOGLOBIN A1C W/O EAG    Collection Time: 08/07/19  4:35 AM   Result Value Ref Range    Hemoglobin A1c 7.1 (H) 4.2 - 5.6 %   POC G3    Collection Time: 08/07/19  4:41 AM   Result Value Ref Range    Device: Non rebreather      Flow rate (POC) 15 L/M    FIO2 (POC) 1.0 %    pH (POC) 7.351 7.35 - 7.45      pCO2 (POC) 54.6 (H) 35.0 - 45.0 MMHG    pO2 (POC) 301 (H) 80 - 100 MMHG    HCO3 (POC) 30.6 (H) 22 - 26 MMOL/L    sO2 (POC) 100 (H) 92 - 97 %    Base excess (POC) 5 mmol/L    Allens test (POC) N/A      Total resp.  rate 26      Site RIGHT RADIAL      Patient temp. 96.1      Specimen type (POC) ARTERIAL      Performed by Jez Jiménez    EKG, 12 LEAD, INITIAL    Collection Time: 08/07/19  4:41 AM   Result Value Ref Range Ventricular Rate 86 BPM    Atrial Rate 86 BPM    P-R Interval 344 ms    QRS Duration 150 ms    Q-T Interval 440 ms    QTC Calculation (Bezet) 526 ms    Calculated P Axis -7 degrees    Calculated R Axis -24 degrees    Calculated T Axis 125 degrees    Diagnosis       Sinus rhythm with sinus arrhythmia with 1st degree AV block  Left bundle branch block  Abnormal ECG  No previous ECGs available     URINALYSIS W/ RFLX MICROSCOPIC    Collection Time: 08/07/19  5:20 AM   Result Value Ref Range    Color DARK YELLOW      Appearance CLOUDY      Specific gravity 1.022 1.005 - 1.030      pH (UA) 5.5 5.0 - 8.0      Protein >1,000 (A) NEG mg/dL    Glucose NEGATIVE  NEG mg/dL    Ketone NEGATIVE  NEG mg/dL    Bilirubin SMALL (A) NEG      Blood MODERATE (A) NEG      Urobilinogen 1.0 0.2 - 1.0 EU/dL    Nitrites POSITIVE (A) NEG      Leukocyte Esterase TRACE (A) NEG     URINE MICROSCOPIC ONLY    Collection Time: 08/07/19  5:20 AM   Result Value Ref Range    WBC 5 to 10 0 - 5 /hpf    RBC TOO NUMEROUS TO COUNT 0 - 5 /hpf    Epithelial cells NEGATIVE  0 - 5 /lpf    Bacteria 1+ (A) NEG /hpf    Mucus FEW (A) NEG /lpf   ECHO ADULT COMPLETE    Collection Time: 08/07/19  8:07 AM   Result Value Ref Range    LA Volume 51.27 18 - 58 mL    Right Atrial Area 4C 12.98 cm2    Ao Root D 4.28 cm    Aortic Valve Systolic Peak Velocity 22.69 cm/s    AoV VTI 21.49 cm    Aortic Valve Area by Continuity of Peak Velocity 7.6 cm2    Aortic Valve Area by Continuity of VTI 3.9 cm2    AoV PG 1.3 mmHg    LVIDd 4.03 (A) 4.2 - 5.9 cm    LVPWd 1.22 (A) 0.6 - 1.0 cm    LVIDs 2.99 cm    IVSd 1.15 (A) 0.6 - 1.0 cm    LV ED Vol A2C 81.2 mL    LV ES Vol A4C 48.9 mL    LV ES Vol BP 46.6 22 - 58 mL    LVOT d 2.54 cm    LVOT Peak Velocity 85.45 cm/s    LVOT Peak Gradient 2.9 mmHg    LVOT VTI 16.54 cm    LV E' Septal Velocity 16.00 cm/s    LV E' Lateral Velocity 21.00 cm/s    MVA (PHT) 5.6 cm2    MV A King 62.35 cm/s    MV E King 141.30 cm/s    MV E/A 2.30     Aortic Valve Systolic Mean Gradient 2.9 mmHg    BP EF 41.7 (A) 55 - 100 %    LV Ejection Fraction MOD 4C 29 %    LV Ejection Fraction MOD 2C 47 %    LA Vol 4C 29.43 18 - 58 mL    LA Vol 2C 73.05 (A) 18 - 58 mL    LV Mass .9 88 - 224 g    LV Mass AL Index 79.7 49 - 115 g/m2    E/E' lateral 6.73     E/E' septal 8.83     IVC proximal 1.79 cm    E/E' ratio (averaged) 7.78     LV ES Vol A2C 42.8 mL    LVES Vol Index BP 22.9 mL/m2    LV ED Vol A4C 68.8 mL    LVED Vol Index BP 39.3 mL/m2    Mitral Valve E Wave Deceleration Time 136.5 ms    Mitral Valve Pressure Half-time 39.6 ms    Left Atrium Major Axis 4.38 cm    Pulmonic Valve Max Velocity 131.55 cm/s    LV ED Vol BP 79.9 67 - 155 ml    LA Vol Index 25.25 16 - 28 ml/m2    LA Vol Index 35.97 16 - 28 ml/m2    LA Vol Index 14.49 16 - 28 ml/m2    LVED Vol Index A4C 33.9 mL/m2    LVED Vol Index A2C 40.0 mL/m2    LVES Vol Index A4C 24.1 mL/m2    LVES Vol Index A2C 21.1 mL/m2    NERI/BSA Pk King 3.7 cm2/m2    NERI/BSA VTI 1.9 cm2/m2    PV peak gradient 6.9 mmHg   GLUCOSE, POC    Collection Time: 08/07/19  8:27 AM   Result Value Ref Range    Glucose (POC) 135 (H) 70 - 110 mg/dL         Chemistry Recent Labs     08/07/19  0435   *   *   K 4.5      CO2 31   BUN 38*   CREA 1.99*   CA 8.5   MG 2.3   AGAP 4   BUCR 19      TP 7.0   ALB 2.6*   GLOB 4.4*   AGRAT 0.6*        Lactic Acid No results found for: LAC  No results for input(s): LAC in the last 72 hours. Liver Enzymes Protein, total   Date Value Ref Range Status   08/07/2019 7.0 6.4 - 8.2 g/dL Final     Albumin   Date Value Ref Range Status   08/07/2019 2.6 (L) 3.4 - 5.0 g/dL Final     Globulin   Date Value Ref Range Status   08/07/2019 4.4 (H) 2.0 - 4.0 g/dL Final     A-G Ratio   Date Value Ref Range Status   08/07/2019 0.6 (L) 0.8 - 1.7   Final     AST (SGOT)   Date Value Ref Range Status   08/07/2019 18 10 - 38 U/L Final     Comment:     PLEASE NOTE NEW REFERENCE RANGE     Alk.  phosphatase   Date Value Ref Range Status   08/07/2019 109 45 - 117 U/L Final     Recent Labs     08/07/19 0435   TP 7.0   ALB 2.6*   GLOB 4.4*   AGRAT 0.6*   SGOT 18           CBC w/Diff Recent Labs     08/07/19 0435   WBC 8.1   RBC 3.40*   HGB 9.5*   HCT 31.0*      GRANS 82*   LYMPH 10*   EOS 2        Cardiac Enzymes Lab Results   Component Value Date/Time    CPK 56 08/07/2019 04:35 AM    CKMB 1.1 08/07/2019 04:35 AM    CKND1 2.0 08/07/2019 04:35 AM    TROIQ 0.05 (H) 08/07/2019 04:35 AM        BNP No results found for: BNP, BNPP, XBNPT     Coagulation No results for input(s): PTP, INR, APTT in the last 72 hours. No lab exists for component: INREXT      Thyroid  No results found for: T4, T3U, TSH, TSHEXT    No results found for: T4     Urinalysis Lab Results   Component Value Date/Time    Color DARK YELLOW 08/07/2019 05:20 AM    Appearance CLOUDY 08/07/2019 05:20 AM    Specific gravity 1.022 08/07/2019 05:20 AM    pH (UA) 5.5 08/07/2019 05:20 AM    Protein >1,000 (A) 08/07/2019 05:20 AM    Glucose NEGATIVE  08/07/2019 05:20 AM    Ketone NEGATIVE  08/07/2019 05:20 AM    Bilirubin SMALL (A) 08/07/2019 05:20 AM    Urobilinogen 1.0 08/07/2019 05:20 AM    Nitrites POSITIVE (A) 08/07/2019 05:20 AM    Leukocyte Esterase TRACE (A) 08/07/2019 05:20 AM    Epithelial cells NEGATIVE  08/07/2019 05:20 AM    Bacteria 1+ (A) 08/07/2019 05:20 AM    WBC 5 to 10 08/07/2019 05:20 AM    RBC TOO NUMEROUS TO COUNT 08/07/2019 05:20 AM        Micro  Recent Labs     08/07/19 0435   CULT NO GROWTH AFTER 1 HOUR     Recent Labs     08/07/19 0435   CULT NO GROWTH AFTER 1 HOUR          Culture data during this hospitalization.    All Micro Results     Procedure Component Value Units Date/Time    CULTURE, BLOOD [[id number]] Collected:  08/07/19 0435    Order Status:  Completed Specimen:  Blood Updated:  08/07/19 0618     Special Requests: NO SPECIAL REQUESTS        Culture result: NO GROWTH AFTER 1 HOUR       CULTURE, URINE [[id number]] Collected:  08/07/19 7219    Order Status:  Completed Specimen:  Cath Urine Updated:  08/07/19 0607    CULTURE, BLOOD [[id number]] Collected:  08/07/19 0528    Order Status:  Completed Specimen:  Blood Updated:  08/07/19 0543         ABG noted. Images report reviewed by me:  CT (Most Recent) (CT chest reviewed by me) No results found for this or any previous visit. CXR reviewed by me:  XR (Most Recent). CXR  reviewed by me and compared with previous CXR Results from [place] encounter on 08/07/19   XR CHEST PORT    Narrative ---------------------------------------------------------------------------  <<<<<<<<<           [address] [facility]           >>>>>>>>>   ---------------------------------------------------------------------------    CLINICAL HISTORY:  Shortness of breath. COMPARISON EXAMINATIONS:  None. ---  SINGLE FRONTAL VIEW OF THE CHEST  ---    The lung volumes are low. The cardiomediastinal silhouette is grossly within  normal limits. There is apparent pulmonary vascular congestion, diffuse  increased markings and bilateral mid to lower lung field infiltrates. No  significant osseous abnormalities are identified.        --------------    Impression Impression:  --------------    There is apparent pulmonary vascular congestion, diffuse increased markings and  bilateral mid to lower lung field infiltrates which suggests interstitial and  pulmonary edema. Bilateral pneumonia is also a possibility. Correlation is  needed. IMPRESSION:   · Acute respiratory failure due to acute CHF exacerbation, required BiPAP. · Acute CHF exacerbation. · Chronic anemia, no external bleeding.    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 RECOMMENDATIONS:   Respiratory: required bipap initially, now on NC at his baseline 2 lpm.   Off bipap since diuresed. ABG noted. Coughing while eating. Speech eval pending. NPO until seen by speech. Use bipap prn. On singulair at [facility]. D/c Breo Ellipta. No hx of asthma or COPD. Start albuterol neb prn. Keep SPO2 >=92%. HOB 30 degree elevation all the time. Aggressive pulmonary toileting. Aspiration precautions. Incentive spirometry. CVS: echo noted with preserved LVEF. Diastolic function can't be accessed. Some RWMA. Can't estimate RVSP. Elevated BNP, CXR suggestive of CHF. Diuresed with lasix. C/w lasix 40 bid and monitor creat and electrolytes. Off BiPAP. Use it PRN. On Eliquis for some unknown reason. ID: abnormal UA. Normal lactate. No leucocytosis. Doubt pneumonia. Coughing while eating. Speech eval pending. Possible UTI. On empiric abx vanco/zosyn/levaquin per hospitalist. D/c vanco and levaquin for now. With this renal function, levaquin will last for long period of time. Follow blood and urine cx. Deescalate antibiotic when appropriate. Hematology/Oncology: anemia but no active external bleeding. Monitor. Renal: monitor renal function on lasix. GI/: speech eval   Endocrine: Monitor BS. Neurology: baseline CVA with left weakness and dementia. Pain/Sedation: no issues  Skin/Wound: no acute issues  Electrolytes: Replace electrolytes per ICU electrolyte replacement protocol. IVF: none  Nutrition: ST eval pending  Prophylaxis: DVT Prophylaxis: eliquis. GI Prophylaxis: zantac. Restraints: none=  Lines/Tubes: PIV  Hernandez: 8/7/19 placed in ER (Medically necessary for strict input/output monitoring in critically ill patient, will remove it when not needed.  Hernandez bundle followed). PT/OT eval and treat. OOB. Advance Directive/Palliative Care: consulted    Will defer respective systems problem management to primary and other respective consultant and follow patient in ICU with primary and other medical team.  Further recommendations will be based on the patient's response to recommended treatment and results of the investigation ordered. Quality Care: PPI, DVT prophylaxis, HOB elevated, Infection control all reviewed and addressed. Care of plan d/w RN, RT, MDR.  D/w patient (answered all questions to satisfaction). Moderate complexity decision making was performed during the evaluation of this patient at high risk for decompensation with multiple organ involvement. Being transferred to tele. Once transferred, will follow from pulmonary standpoint.         Binta Flores MD  8/7/2019

## 2019-08-07 NOTE — PROGRESS NOTES
Problem: Dysphagia (Adult)  Goal: *Acute Goals and Plan of Care (Insert Text)  Description  Recommendations:  Diet: Puree/thin  Meds: Crushed in puree  Aspiration Precautions  Oral Care TID    Goals:  Patient will:  1. Tolerate PO trials with 0 s/s overt distress in 4/5 trials  2. Utilize compensatory swallow strategies/maneuvers (decrease bite/sip, size/rate, alt. liq/sol) with min cues in 4/5 trials  3. Perform oral-motor/laryngeal exercises to increase oropharyngeal swallow function with min cues  4. Complete an objective swallow study (i.e., MBSS) to assess swallow integrity, r/o aspiration, and determine of safest LRD, min A - goal met 8/7     SPEECH LANGUAGE PATHOLOGY BEDSIDE SWALLOW EVALUATION    Patient: Adrianna Jeffries (89 y.o. male)  Date: 8/7/2019  Primary Diagnosis: Acute exacerbation of CHF (congestive heart failure) ([facility]) [I50.9]  Respiratory failure ([facility]) [J96.90]        Precautions: Aspiration   PLOF: SNF    ASSESSMENT :  Based on the objective data described below, the patient presents with moderate oropharyngeal dysphagia in the setting of sepsis. A&Ox2. Poor historian; reports he is on thickened liquid diet but unable to clarify NTL vs HTL. Oral-motor exam revealed pt edentulous; however, all other structures grossly intact for mastication and deglutition. This day, accepted HTL trials via cup sip, puree and solids without overt s/sx of aspiration. Demo moderate oral delay with all trials. Pills presented whole during med pass; able to swallow 4/5 immediately, required multiple sips HTL and bites puree to clear last pill. Solid trial presented; demo significantly delayed oral bolus preparation. At this time, recommend puree/HTL diet with MBSS to be completed this day. Aspiration precautions. D/w RN, Rusty Mathis. SLP will follow as indicated. Patient will benefit from skilled intervention to address the above impairments.   Patient's rehabilitation potential is considered to be Fair  Factors which may influence rehabilitation potential include:   ? None noted  ? Mental ability/status  ? Medical condition  ? Home/family situation and support systems  ? Safety awareness  ? Pain tolerance/management  ? Other:      PLAN :  Recommendations and Planned Interventions:  Puree/thin  Frequency/Duration: Patient will be followed by speech-language pathology 1-2 times per day/4-7 days per week to address goals. Discharge Recommendations: To Be Determined     SUBJECTIVE:   Patient stated [address] you know all that about me? . OBJECTIVE:     Past Medical History:   Diagnosis Date    CAD (coronary artery disease)     Chronic kidney disease     Complicated UTI (urinary tract infection)     Deficiency anemia     Difficulty urinating     Elevated PSA     Endocarditis     ESRD (end stage renal disease) ([facility]     Fistula     Hernandez catheter in place     Gross hematuria     History of blood transfusion     History of kidney transplant     HTN (hypertension)     Hypertension     MRSA (methicillin resistant Staphylococcus aureus)     Muscle atrophy     Neurogenic bladder, NOS     Pneumonia     Recurrent UTI     Renal cyst     Retention of urine, unspecified     Risk for falls     Stroke [facility])     Urinary obstruction     Urinary retention      Past Surgical History:   Procedure Laterality Date    HX AORTIC VALVE REPLACEMENT  07/28/2007    HX CORONARY ARTERY BYPASS GRAFT      HX MITRAL VALVE REPLACEMENT  09/2008    HX UROLOGICAL  2006    kidney transplant    HX UROLOGICAL  09/02/2015    Suprapubic Tube Ins.     HX UROLOGICAL  09/12/2015    Cystoscopy, Clot Evac.     HX UROLOGICAL  09/04/2015    Cystoscopy, clot evac. flug.      HX UROLOGICAL  10/15/2018    PNBx-TRUS Vol 58.57 cm3, Benign, Dr. Kedar Fitch VASCULAR ACCESS  07/2007    a/v fistula      Home Situation:   Home Situation  Home Environment: Apartment  One/Two Story Residence: One story  Living Alone: No  Support Systems: Family member(s)  Patient Expects to be Discharged to[de-identified] Apartment  Current DME Used/Available at Home: None    Diet prior to admission: ?Unknown  Current Diet:  Puree/HTL     Cognitive and Communication Status:  Neurologic State: Alert  Orientation Level: Oriented to person, Oriented to place, Disoriented to situation, Disoriented to time  Cognition: Follows commands, Memory loss  Perception: Appears intact  Perseveration: Perseverates during conversation  Safety/Judgement: Awareness of environment  Oral Assessment:  Oral Assessment  Labial: No impairment  Dentition: Edentulous  Oral Hygiene: Fair  Lingual: No impairment  Velum: No impairment  Mandible: No impairment  P.O. Trials:  Patient Position: Chair 90  Vocal quality prior to P.O.: No impairment  Consistency Presented: Thin liquid;Puree; Solid  How Presented: Self-fed/presented;Straw;Successive swallows     Bolus Acceptance: No impairment  Bolus Formation/Control: Impaired  Type of Impairment: Delayed;Mastication  Propulsion: Delayed (# of seconds)  Oral Residue: 10-50% of bolus; Lingual  Initiation of Swallow: Delayed (# of seconds)  Laryngeal Elevation: Functional  Aspiration Signs/Symptoms: Clear throat  Pharyngeal Phase Characteristics: Suspected pharyngeal residue  Effective Modifications: Alternate liquids/solids;Cup/sip;Small sips and bites  Cues for Modifications: Moderate       Oral Phase Severity: Moderate  Pharyngeal Phase Severity : Moderate    PAIN:  Pain level pre-treatment: 0/10   Pain level post-treatment: 0/10     After treatment:   ?            Patient left in no apparent distress sitting up in chair  ? Patient left in no apparent distress in bed  ? Call bell left within reach  ? Nursing notified  ? Family present  ? Caregiver present  ?             Bed alarm activated    COMMUNICATION/EDUCATION:   ?            Aspiration precautions; swallow safety; compensatory techniques. ?            Patient/family have participated as able in goal setting and plan of care. ?            Patient/family agree to work toward stated goals and plan of care. ?            Patient understands intent and goals of therapy; neutral about participation. ? Patient unable to participate in goal setting/plan of care; educ ongoing with interdisciplinary staff  ? Posted safety precautions in patient's room.     Thank you for this referral,  Kvng Umana SLP  Time Calculation: 17 mins

## 2019-08-07 NOTE — PROGRESS NOTES
Problem: Falls - Risk of  Goal: *Absence of Falls  Description  Document Sultana Francis Fall Risk and appropriate interventions in the flowsheet. Outcome: Progressing Towards Goal  Note:   Fall Risk Interventions:  Mobility Interventions: Patient to call before getting OOB    Mentation Interventions: Door open when patient unattended    Medication Interventions: Patient to call before getting OOB    Elimination Interventions: Call light in reach              Problem: Patient Education: Go to Patient Education Activity  Goal: Patient/Family Education  Outcome: Progressing Towards Goal     Problem: Diabetes Self-Management  Goal: *Disease process and treatment process  Description  Define diabetes and identify own type of diabetes; list 3 options for treating diabetes. Outcome: Progressing Towards Goal  Goal: *Incorporating nutritional management into lifestyle  Description  Describe effect of type, amount and timing of food on blood glucose; list 3 methods for planning meals. Outcome: Progressing Towards Goal  Goal: *Incorporating physical activity into lifestyle  Description  State effect of exercise on blood glucose levels. Outcome: Progressing Towards Goal  Goal: *Developing strategies to promote health/change behavior  Description  Define the ABC's of diabetes; identify appropriate screenings, schedule and personal plan for screenings. Outcome: Progressing Towards Goal  Goal: *Using medications safely  Description  State effect of diabetes medications on diabetes; name diabetes medication taking, action and side effects. Outcome: Progressing Towards Goal  Goal: *Monitoring blood glucose, interpreting and using results  Description  Identify recommended blood glucose targets  and personal targets.   Outcome: Progressing Towards Goal  Goal: *Prevention, detection, treatment of acute complications  Description  List symptoms of hyper- and hypoglycemia; describe how to treat low blood sugar and actions for lowering high blood glucose level. Outcome: Progressing Towards Goal  Goal: *Prevention, detection and treatment of chronic complications  Description  Define the natural course of diabetes and describe the relationship of blood glucose levels to long term complications of diabetes. Outcome: Progressing Towards Goal  Goal: *Developing strategies to address psychosocial issues  Description  Describe feelings about living with diabetes; identify support needed and support network  Outcome: Progressing Towards Goal  Goal: *Insulin pump training  Outcome: Progressing Towards Goal  Goal: *Sick day guidelines  Outcome: Progressing Towards Goal  Goal: *Patient Specific Goal (EDIT GOAL, INSERT TEXT)  Outcome: Progressing Towards Goal     Problem: Patient Education: Go to Patient Education Activity  Goal: Patient/Family Education  Outcome: Progressing Towards Goal     Problem: Pressure Injury - Risk of  Goal: *Prevention of pressure injury  Description  Document [place]se[place] Scale and appropriate interventions in the flowsheet.   Outcome: Progressing Towards Goal  Note:   Pressure Injury Interventions:  Sensory Interventions: Discuss PT/OT consult with provider    Moisture Interventions: Check for incontinence Q2 hours and as needed    Activity Interventions: PT/OT evaluation    Mobility Interventions: PT/OT evaluation    Nutrition Interventions: Discuss nutritional consult with provider    Friction and Shear Interventions: Foam dressings/transparent film/skin sealants                Problem: Patient Education: Go to Patient Education Activity  Goal: Patient/Family Education  Outcome: Progressing Towards Goal     Problem: Heart Failure: Discharge Outcomes  Goal: *Demonstrates ability to perform prescribed activity without shortness of breath or discomfort  Outcome: Progressing Towards Goal  Goal: *Describes available resources and support systems  Description  (eg: Home Health, Palliative Care, Advanced Medical Directive)  Outcome: Progressing Towards Goal  Goal: *Understands and describes signs and symptoms to report to providers(Stroke Metric)  Outcome: Progressing Towards Goal     Problem: Aspiration - Risk of  Goal: *Absence of aspiration  Outcome: Progressing Towards Goal

## 2019-08-07 NOTE — PROGRESS NOTES
[address] [facility] [phone] (COPE)    Patient Name: Ezekiel Roman  YOB: 1935    Reason for Consult: Goals of care  Requesting Provider: Dr. Allen Frances   Primary Care Physician: Miladis Mo MD     SUMMARY:   Ezekiel Roman is a 81 y.o. with a past history of HTN, DM, CAD, CKD, CVA, dementia,, who was admitted on 8/7/2019 from [place] with a diagnosis of CHF exacerbation. Current medical issues leading to Palliative Medicine involvement include: advanced dementia. Palliative medicine consult noted and appreciated. PM team members Casi Duong LCSW and this writer attempted to see Mr. Gerri Conroy initially in ICU but staff was working with him to get him ready for transfer. No family was at bedside. Called son, Renato Peraza. to introduce palliative medicine and to arrange family meeting to discuss goals of care. Brayan Rodriguez shared that he and sister Godfrey Urbina are both medical power of . He stated his father was recently at [facility] for similar situation. \"Trouble breathing\". He further shared that during that hospitalization and again since that time, he and Godfrey Urbina had spoken at length regarding goals of care. \"My father is a Full Code and would want us to do everything possible for him to live\". He confirmed that would include CPR and intubation. He stated he had good understanding from discussion with physicians at [facility]. He did agree to meet with palliative medicine team to discuss this further. He attempted to reach his sister, Godfrey Urbina, as she is the other [facility]. He was not able to reach Shantelle but stated he would speak to her and call me back with a time we could meet. Saw Mr. Gerri Conroy after he was moved to Telemetry Unit. He was sitting up in bed talking to his daughter, Mya Goddard, as well as a granddaughter and two granddaughters.  He was pleasantly confused, he did remember that he was here \"for my breathing' and was eventually able to identify his family members. He denied complaints stating \"I am feeling much better\". Introduced palliative medicine to daughter, Lucinda Perales who expressed appreciation for visit. Awaiting return call for meeting time with Breanne Haney. Updated care manager and nursing. Thank you for including palliative medicine in the care of this patient. RECOMMENDATIONS:   1. Continue current care  2. Full Code per family  3.  Return to LTC at [facility] when discharged     [address] / TREATMENT PREFERENCES:     Patient/Health Care Proxy Stated Goals: Prolong life  TREATMENT PREFERENCES:   Code Status: Full Code    Advance Care Planning:    Primary Decision Maker: Samuel Piña - Son - [phone]    Primary Decision Maker: Mao Noe - Daughter - [phone]  Advance Care Planning 8/7/2019   Patient's Healthcare Decision Maker is: Named in scanned ACP document   Confirm Advance Directive Yes, on file       Medical Interventions: Full interventions        CLINICAL ASSESSMENT:   Palliative Performance Scale (PPS):  PPS: 30    Modified ESAS Completed by: provider   Fatigue: 0 Drowsiness: 0   Depression: 0 Pain: 0   Anxiety: 0 Nausea: 0   Anorexia: 0 Dyspnea: 2                 Clinical Pain Assessment (nonverbal scale for severity on nonverbal patients):   Clinical Pain Assessment  Severity: 0     Activity (Movement): Lying quietly, normal position     PSYCHOSOCIAL/SPIRITUAL ASSESSMENT:   Palliative IDT has assessed this patient for cultural preferences / practices and a referral made as appropriate to needs (Cultural Services, Patient Advocacy, Ethics, etc.)    Any spiritual / Zoroastrianism concerns:  [] Yes /  [x] No    Caregiver Burnout:  [] Yes /  [x] No /  [] No Caregiver Present      Anticipatory grief assessment:   [x] Normal  / [] Maladaptive

## 2019-08-07 NOTE — PROGRESS NOTES
conducted an initial consultation and Spiritual Assessment for Tianna Elizabeth, who is a 81 y. o.,male. Patient was confused, eyes closed but muttering a bit. No family present. The reason the Patient came to the hospital is:   Patient Active Problem List    Diagnosis Date Noted    Respiratory failure ([facility] 08/07/2019    Acute exacerbation of [facility]F (congestive heart failure) [facility] 08/07/2019    CKD (chronic kidney disease) stage 3, GFR 30-59 ml/min ([facility] 08/07/2019    Dementia 08/07/2019    History of CVA (cerebrovascular accident) 08/07/2019    Insulin dependent diabetes mellitus with complications ([facility] 69/38/4036    UTI (urinary tract infection) 08/07/2019        Initiated a relationship of care and support. Left information about Spiritual Care Services. Offered prayer . Chart reviewed. Chaplains will continue to follow and will provide pastoral care as needed or requested.  recommends bedside caregivers page  on duty if patient shows signs of acute spiritual or emotional distress. [address], M.Div.   Board Certified   [phone] - Office

## 2019-08-07 NOTE — PROGRESS NOTES
Hospitalist Progress Note    Patient: Jolanta Koehler MRN: [id number]  CSN: [id number]    YOB: 1935  Age: 81 y.o.   Sex: male    DOA: 8/7/2019 LOS:  LOS: 0 days          Seen on rounds  Admitted earlier this am    Patient Active Problem List   Diagnosis Code    Respiratory failure ([facility] J96.90    Acute exacerbation of CHF (congestive heart failure) ([facility]) I50.9    CKD (chronic kidney disease) stage 3, GFR 30-59 ml/min ([facility]) N18.3    Dementia F03.90    History of CVA (cerebrovascular accident) Z86.73    Insulin dependent diabetes mellitus with complications ([facility] E23.7, Z79.4    UTI (urinary tract infection) N39.0     resp failure resolved  Receiving lasix for acute CHF-type unknown yet-echo result pending  Looks like he has COPD with his noted NH meds  IDDM, CKD, and hx of CVA, dementia    Treat his UTI as doing, follow cx results    Can transfer out of ICU as stable on NC 02  Needs speech swallow eval this am

## 2019-08-08 LAB
ALBUMIN SERPL-MCNC: 2.4 G/DL (ref 3.4–5)
ALBUMIN/GLOB SERPL: 0.6 {RATIO} (ref 0.8–1.7)
ALP SERPL-CCNC: 102 U/L (ref 45–117)
ALT SERPL-CCNC: 16 U/L (ref 16–61)
ANION GAP SERPL CALC-SCNC: 4 MMOL/L (ref 3–18)
AST SERPL-CCNC: 13 U/L (ref 10–38)
BILIRUB SERPL-MCNC: 0.7 MG/DL (ref 0.2–1)
BUN SERPL-MCNC: 51 MG/DL (ref 7–18)
BUN/CREAT SERPL: 19 (ref 12–20)
CALCIUM SERPL-MCNC: 8.1 MG/DL (ref 8.5–10.1)
CHLORIDE SERPL-SCNC: 107 MMOL/L (ref 100–111)
CK MB CFR SERPL CALC: 2.4 % (ref 0–4)
CK MB SERPL-MCNC: 1 NG/ML (ref 5–25)
CK SERPL-CCNC: 41 U/L (ref 39–308)
CO2 SERPL-SCNC: 30 MMOL/L (ref 21–32)
CREAT SERPL-MCNC: 2.63 MG/DL (ref 0.6–1.3)
ERYTHROCYTE [DISTWIDTH] IN BLOOD BY AUTOMATED COUNT: 14.6 % (ref 11.6–14.5)
GLOBULIN SER CALC-MCNC: 4.2 G/DL (ref 2–4)
GLUCOSE BLD STRIP.AUTO-MCNC: 140 MG/DL (ref 70–110)
GLUCOSE BLD STRIP.AUTO-MCNC: 236 MG/DL (ref 70–110)
GLUCOSE BLD STRIP.AUTO-MCNC: 289 MG/DL (ref 70–110)
GLUCOSE BLD STRIP.AUTO-MCNC: 375 MG/DL (ref 70–110)
GLUCOSE SERPL-MCNC: 364 MG/DL (ref 74–99)
HCT VFR BLD AUTO: 27.1 % (ref 36–48)
HGB BLD-MCNC: 8.4 G/DL (ref 13–16)
MCH RBC QN AUTO: 27.7 PG (ref 24–34)
MCHC RBC AUTO-ENTMCNC: 31 G/DL (ref 31–37)
MCV RBC AUTO: 89.4 FL (ref 74–97)
PLATELET # BLD AUTO: 190 K/UL (ref 135–420)
PMV BLD AUTO: 11.5 FL (ref 9.2–11.8)
POTASSIUM SERPL-SCNC: 5.1 MMOL/L (ref 3.5–5.5)
PROT SERPL-MCNC: 6.6 G/DL (ref 6.4–8.2)
RBC # BLD AUTO: 3.03 M/UL (ref 4.7–5.5)
SODIUM SERPL-SCNC: 141 MMOL/L (ref 136–145)
TROPONIN I SERPL-MCNC: 0.05 NG/ML (ref 0–0.04)
WBC # BLD AUTO: 5.1 K/UL (ref 4.6–13.2)

## 2019-08-08 PROCEDURE — 82550 ASSAY OF CK (CPK): CPT

## 2019-08-08 PROCEDURE — 80053 COMPREHEN METABOLIC PANEL: CPT

## 2019-08-08 PROCEDURE — 74011636637 HC RX REV CODE- 636/637: Performed by: HOSPITALIST

## 2019-08-08 PROCEDURE — 74011250636 HC RX REV CODE- 250/636: Performed by: FAMILY MEDICINE

## 2019-08-08 PROCEDURE — 65660000000 HC RM CCU STEPDOWN

## 2019-08-08 PROCEDURE — 97163 PT EVAL HIGH COMPLEX 45 MIN: CPT

## 2019-08-08 PROCEDURE — 85027 COMPLETE CBC AUTOMATED: CPT

## 2019-08-08 PROCEDURE — 74011250637 HC RX REV CODE- 250/637: Performed by: HOSPITALIST

## 2019-08-08 PROCEDURE — 74011250637 HC RX REV CODE- 250/637: Performed by: FAMILY MEDICINE

## 2019-08-08 PROCEDURE — 82962 GLUCOSE BLOOD TEST: CPT

## 2019-08-08 PROCEDURE — 77030010545

## 2019-08-08 PROCEDURE — 92526 ORAL FUNCTION THERAPY: CPT

## 2019-08-08 PROCEDURE — 74011000258 HC RX REV CODE- 258: Performed by: FAMILY MEDICINE

## 2019-08-08 PROCEDURE — 74011636637 HC RX REV CODE- 636/637: Performed by: FAMILY MEDICINE

## 2019-08-08 PROCEDURE — 77010033678 HC OXYGEN DAILY

## 2019-08-08 PROCEDURE — 97530 THERAPEUTIC ACTIVITIES: CPT

## 2019-08-08 PROCEDURE — 36415 COLL VENOUS BLD VENIPUNCTURE: CPT

## 2019-08-08 RX ORDER — INSULIN GLARGINE 100 [IU]/ML
20 INJECTION, SOLUTION SUBCUTANEOUS
Status: DISCONTINUED | OUTPATIENT
Start: 2019-08-08 | End: 2019-08-10 | Stop reason: HOSPADM

## 2019-08-08 RX ORDER — INSULIN LISPRO 100 [IU]/ML
8 INJECTION, SOLUTION INTRAVENOUS; SUBCUTANEOUS
Status: DISCONTINUED | OUTPATIENT
Start: 2019-08-08 | End: 2019-08-10 | Stop reason: HOSPADM

## 2019-08-08 RX ORDER — FUROSEMIDE 40 MG/1
40 TABLET ORAL DAILY
Status: DISCONTINUED | OUTPATIENT
Start: 2019-08-08 | End: 2019-08-09

## 2019-08-08 RX ADMIN — ALFUZOSIN HYDROCHLORIDE 10 MG: 10 TABLET ORAL at 09:29

## 2019-08-08 RX ADMIN — APIXABAN 2.5 MG: 2.5 TABLET, FILM COATED ORAL at 09:30

## 2019-08-08 RX ADMIN — INSULIN LISPRO 15 UNITS: 100 INJECTION, SOLUTION INTRAVENOUS; SUBCUTANEOUS at 06:48

## 2019-08-08 RX ADMIN — BRIMONIDINE TARTRATE 1 DROP: 2 SOLUTION OPHTHALMIC at 09:30

## 2019-08-08 RX ADMIN — Medication 5 MG: at 22:22

## 2019-08-08 RX ADMIN — HYDRALAZINE HYDROCHLORIDE 50 MG: 50 TABLET, FILM COATED ORAL at 09:30

## 2019-08-08 RX ADMIN — ATORVASTATIN CALCIUM 40 MG: 20 TABLET, FILM COATED ORAL at 09:30

## 2019-08-08 RX ADMIN — RANITIDINE 150 MG: 150 TABLET ORAL at 22:22

## 2019-08-08 RX ADMIN — HYDRALAZINE HYDROCHLORIDE 50 MG: 50 TABLET, FILM COATED ORAL at 16:38

## 2019-08-08 RX ADMIN — FUROSEMIDE 40 MG: 10 INJECTION, SOLUTION INTRAVENOUS at 05:27

## 2019-08-08 RX ADMIN — INSULIN LISPRO 6 UNITS: 100 INJECTION, SOLUTION INTRAVENOUS; SUBCUTANEOUS at 22:23

## 2019-08-08 RX ADMIN — INSULIN LISPRO 8 UNITS: 100 INJECTION, SOLUTION INTRAVENOUS; SUBCUTANEOUS at 12:05

## 2019-08-08 RX ADMIN — CALCIUM CARBONATE 500 MG (1,250 MG)-VITAMIN D3 200 UNIT TABLET 1 TABLET: at 09:30

## 2019-08-08 RX ADMIN — PIPERACILLIN SODIUM,TAZOBACTAM SODIUM 3.38 G: 3; .375 INJECTION, POWDER, FOR SOLUTION INTRAVENOUS at 03:40

## 2019-08-08 RX ADMIN — INSULIN LISPRO 8 UNITS: 100 INJECTION, SOLUTION INTRAVENOUS; SUBCUTANEOUS at 09:30

## 2019-08-08 RX ADMIN — PIPERACILLIN SODIUM,TAZOBACTAM SODIUM 3.38 G: 3; .375 INJECTION, POWDER, FOR SOLUTION INTRAVENOUS at 22:22

## 2019-08-08 RX ADMIN — RANITIDINE 150 MG: 150 TABLET ORAL at 09:30

## 2019-08-08 RX ADMIN — APIXABAN 2.5 MG: 2.5 TABLET, FILM COATED ORAL at 22:22

## 2019-08-08 RX ADMIN — HYDRALAZINE HYDROCHLORIDE 50 MG: 50 TABLET, FILM COATED ORAL at 22:22

## 2019-08-08 RX ADMIN — FUROSEMIDE 40 MG: 40 TABLET ORAL at 12:06

## 2019-08-08 RX ADMIN — BRIMONIDINE TARTRATE 1 DROP: 2 SOLUTION OPHTHALMIC at 22:22

## 2019-08-08 RX ADMIN — MONTELUKAST 10 MG: 10 TABLET, FILM COATED ORAL at 09:30

## 2019-08-08 RX ADMIN — INSULIN LISPRO 9 UNITS: 100 INJECTION, SOLUTION INTRAVENOUS; SUBCUTANEOUS at 12:06

## 2019-08-08 RX ADMIN — PREGABALIN 75 MG: 75 CAPSULE ORAL at 09:30

## 2019-08-08 RX ADMIN — PIPERACILLIN SODIUM,TAZOBACTAM SODIUM 3.38 G: 3; .375 INJECTION, POWDER, FOR SOLUTION INTRAVENOUS at 09:30

## 2019-08-08 RX ADMIN — PIPERACILLIN SODIUM,TAZOBACTAM SODIUM 3.38 G: 3; .375 INJECTION, POWDER, FOR SOLUTION INTRAVENOUS at 16:37

## 2019-08-08 RX ADMIN — INSULIN GLARGINE 20 UNITS: 100 INJECTION, SOLUTION SUBCUTANEOUS at 22:22

## 2019-08-08 RX ADMIN — ISOSORBIDE MONONITRATE 60 MG: 30 TABLET, EXTENDED RELEASE ORAL at 09:30

## 2019-08-08 RX ADMIN — AMLODIPINE BESYLATE 5 MG: 5 TABLET ORAL at 09:30

## 2019-08-08 NOTE — ROUTINE PROCESS
Bedside and Verbal shift change report given to  Roberta Haskins  RN (oncoming nurse) by Jovany Queen RN (offgoing nurse). Report included the following information SBAR and Kardex.

## 2019-08-08 NOTE — PROGRESS NOTES
Bedside shift change report given to Lucio Galeano (oncoming nurse) by Nickey Duverney RN (offgoing nurse). Report included the following information SBAR, Kardex, ED Summary, Intake/Output, MAR and Accordion.

## 2019-08-08 NOTE — PROGRESS NOTES
Palliative medicine team members Oliver Dow LCSW and myself saw for follow up. Mr. Clarice Guerra was sitting up in bed, alert but very confused. He did not seem to be in any distress. When asked how he was feeling he stated \"I am terrible. I can't find my wife\". Attempted to reorient to place he gave his old home address with his phone number. Attempted to call daughter Melba Benitez [phone] but had to leave a message. Palliative medicine team remains available for support.     Yousuf Roper RN

## 2019-08-08 NOTE — PROGRESS NOTES
Problem: Mobility Impaired (Adult and Pediatric)  Goal: *Acute Goals and Plan of Care (Insert Text)  Description  None   Outcome: Resolved/Not Met   PHYSICAL THERAPY EVALUATION & DISCHARGE    Patient: Any Leiva (89 y.o. male)  Date: 8/8/2019  Primary Diagnosis: Acute exacerbation of CHF (congestive heart failure) ([facility] [I50.9]  [facility] [J96.90]        Precautions:  Fall    ASSESSMENT AND RECOMMENDATIONS:  Based on the objective data described below, the patient presents with TA w/ bed mobility and transfers. Pt seen in supine prior to session w/ supplemental O2, IV, telemonitor, and B/L SCDs donned. Pt is only alert to self and place at this time. Pt at times will yell out a female's name, and when asked who he is trying to call he tells this PT none of your business. Pt is very confused and requires frequent redirecting during session. Pt demonstrates L sided neglect and maintains head turned to the R side. Pt has no functional use of the L UE/LE. Per Cassius pt is bed bound. Pt able to sit at the EOB w/ TA x2. Pt initially required manual cues to maintain upright sitting w/ R UE support. Pt able to maintain static sitting however unable to maintain dynamic sitting balance. Pt transferred back to supine in bed after session, call bell and tray in reach, nurse present in the room and consulted after session. At this time will d/c pt from acute PT as skilled services are not appropriate pt may be at baseline. Skilled physical therapy is not indicated at this time. Discharge Recommendations: LTC  Further Equipment Recommendations for Discharge: N/A      SUBJECTIVE:   Patient stated Is it raining outside?     OBJECTIVE DATA SUMMARY:     Past Medical History:   Diagnosis Date    CAD (coronary artery disease)     Chronic kidney disease     Dementia     Diabetes ([facility]     Hypertension     Stroke [facility])    History reviewed. No pertinent surgical history.   Barriers to Learning/Limitations: yes; cognitive and physical  Compensate with: visual, verbal, tactile, kinesthetic cues/model  Prior Level of Function/Home Situation:   Home Situation  Home Environment: Long term care([facility])  Living Alone: No  Support Systems: Family member(s), Skilled nursing facility  Patient Expects to be Discharged to[de-identified] Skilled nursing facility  Critical Behavior:  Neurologic State: Alert;Confused  Orientation Level: Disoriented to situation;Disoriented to time;Oriented to person;Oriented to place  Cognition: Follows commands  Psychosocial  Patient Behaviors: Calm; Cooperative  Needs Expressed: Educational;Emotional  Purposeful Interaction: Yes  Pt Identified Daily Priority: Clinical issues (comment)  Caritas Process: Nurture loving kindness;Nurture spiritual self;Establish trust;Teaching/learning; Attend basic human needs  Caring Interventions: Reassure; Therapeutic modalities  Reassure: Therapeutic listening; Informing;Caring rounds  Therapeutic Modalities: Intentional therapeutic touch;Humor  Skin Condition/Temp: Dry;Warm;Fragile  Skin Integrity: Tear(left knee)  Skin Integumentary  Skin Color: Appropriate for ethnicity  Skin Condition/Temp: Dry;Warm;Fragile  Skin Integrity: Tear(left knee)  Turgor: Non-tenting  Hair Growth: Present  Varicosities: Absent  Strength:    Strength: Generally decreased, functional  Range Of Motion:  AROM: Generally decreased, functional  Functional Mobility:  Bed Mobility:  Supine to Sit: Total assistance;Assist x2  Sit to Supine: Total assistance;Assist x2  Scooting: Total assistance;Assist x2  Balance:   Sitting: Impaired; With support  Sitting - Static: Fair (occasional)  Sitting - Dynamic: Fair (occasional); Poor (constant support)  Standing: (NT)  Ambulation/Gait Training:  Gait Description (WDL): (NT)  Pain:  Pain Scale 1: Numeric (0 - 10)  Pain Intensity 1: 0  Activity Tolerance:   Fair  Please refer to the flowsheet for vital signs taken during this treatment. After treatment:   ? Patient left in no apparent distress sitting up in chair  ? Patient left in no apparent distress in bed  ? Call bell left within reach  ? Nursing notified  ? Caregiver present  ? Bed alarm activated    COMMUNICATION/EDUCATION:   ?         Fall prevention education was provided and the patient/caregiver indicated understanding. ? Patient/family have participated as able in goal setting and plan of care. ?         Patient/family agree to work toward stated goals and plan of care. ?         Patient understands intent and goals of therapy, but is neutral about his/her participation. ? Patient is unable to participate in goal setting and plan of care.     Thank you for this referral.  Mynor Escalante, PT   Time Calculation: 24 mins   Eval Complexity: History: HIGH Complexity :3+ comorbidities / personal factors will impact the outcome/ POC Exam:MEDIUM Complexity : 3 Standardized tests and measures addressing body structure, function, activity limitation and / or participation in recreation  Presentation: MEDIUM Complexity : Evolving with changing characteristics  Clinical Decision Making:High Complexity sit at the EOB only  Overall Complexity:HIGH

## 2019-08-08 NOTE — PROGRESS NOTES
Bedside shift change report given to [address] [place] [address] (oncoming nurse) by Madison Villareal (offgoing nurse). Report included the following information SBAR, Kardex, ED Summary, Intake/Output, MAR and Accordion. 3562 Shift assessment complete . Shift Summary: Shift uneventful. No complaints of chest pain or shortness of breath. Call light is within reach.

## 2019-08-08 NOTE — PROGRESS NOTES
Problem: Falls - Risk of  Goal: *Absence of Falls  Description  Document Radha Patricia Fall Risk and appropriate interventions in the flowsheet. Outcome: Progressing Towards Goal  Note:   Fall Risk Interventions:  Mobility Interventions: Assess mobility with egress test, Bed/chair exit alarm, Communicate number of staff needed for ambulation/transfer    Mentation Interventions: Door open when patient unattended, Bed/chair exit alarm, Adequate sleep, hydration, pain control    Medication Interventions: Bed/chair exit alarm    Elimination Interventions: Bed/chair exit alarm              Problem: Patient Education: Go to Patient Education Activity  Goal: Patient/Family Education  Outcome: Progressing Towards Goal     Problem: Diabetes Self-Management  Goal: *Disease process and treatment process  Description  Define diabetes and identify own type of diabetes; list 3 options for treating diabetes. Outcome: Progressing Towards Goal  Goal: *Incorporating nutritional management into lifestyle  Description  Describe effect of type, amount and timing of food on blood glucose; list 3 methods for planning meals. Outcome: Progressing Towards Goal  Goal: *Incorporating physical activity into lifestyle  Description  State effect of exercise on blood glucose levels. Outcome: Progressing Towards Goal  Goal: *Developing strategies to promote health/change behavior  Description  Define the ABC's of diabetes; identify appropriate screenings, schedule and personal plan for screenings. Outcome: Progressing Towards Goal  Goal: *Using medications safely  Description  State effect of diabetes medications on diabetes; name diabetes medication taking, action and side effects. Outcome: Progressing Towards Goal  Goal: *Monitoring blood glucose, interpreting and using results  Description  Identify recommended blood glucose targets  and personal targets.   Outcome: Progressing Towards Goal  Goal: *Prevention, detection, treatment of acute complications  Description  List symptoms of hyper- and hypoglycemia; describe how to treat low blood sugar and actions for lowering  high blood glucose level. Outcome: Progressing Towards Goal  Goal: *Prevention, detection and treatment of chronic complications  Description  Define the natural course of diabetes and describe the relationship of blood glucose levels to long term complications of diabetes. Outcome: Progressing Towards Goal  Goal: *Developing strategies to address psychosocial issues  Description  Describe feelings about living with diabetes; identify support needed and support network  Outcome: Progressing Towards Goal  Goal: *Insulin pump training  Outcome: Progressing Towards Goal  Goal: *Sick day guidelines  Outcome: Progressing Towards Goal  Goal: *Patient Specific Goal (EDIT GOAL, INSERT TEXT)  Outcome: Progressing Towards Goal     Problem: Patient Education: Go to Patient Education Activity  Goal: Patient/Family Education  Outcome: Progressing Towards Goal     Problem: Pressure Injury - Risk of  Goal: *Prevention of pressure injury  Description  Document [place] Scale and appropriate interventions in the flowsheet.   Outcome: Progressing Towards Goal  Note:   Pressure Injury Interventions:  Sensory Interventions: Assess changes in LOC, Keep linens dry and wrinkle-free, Discuss PT/OT consult with provider, Monitor skin under medical devices, Maintain/enhance activity level    Moisture Interventions: Check for incontinence Q2 hours and as needed, Absorbent underpads, Maintain skin hydration (lotion/cream)    Activity Interventions: Pressure redistribution bed/mattress(bed type)    Mobility Interventions: PT/OT evaluation, Pressure redistribution bed/mattress (bed type)    Nutrition Interventions: Document food/fluid/supplement intake    Friction and Shear Interventions: HOB 30 degrees or less, Transferring/repositioning devices                Problem: Patient Education: Go to Patient Education Activity  Goal: Patient/Family Education  Outcome: Progressing Towards Goal     Problem: Heart Failure: Discharge Outcomes  Goal: *Demonstrates ability to perform prescribed activity without shortness of breath or discomfort  Outcome: Progressing Towards Goal  Goal: *Describes available resources and support systems  Description  (eg: Home Health, Palliative Care, Advanced Medical Directive)  Outcome: Progressing Towards Goal  Goal: *Understands and describes signs and symptoms to report to providers(Stroke Metric)  Outcome: Progressing Towards Goal     Problem: Aspiration - Risk of  Goal: *Absence of aspiration  Outcome: Progressing Towards Goal     Problem: Patient Education: Go to Patient Education Activity  Goal: Patient/Family Education  Outcome: Progressing Towards Goal

## 2019-08-08 NOTE — PROGRESS NOTES
Hospitalist Progress Note    Patient: Smith Schmidt MRN: [id number]  CSN: [id number]    YOB: 1935  Age: 81 y.o. Sex: male    DOA: 8/7/2019 LOS:  LOS: 1 day          Chief Complaint:    SOB      Assessment/Plan   82 yo NH patient sent from NH for SOB    Initially on bipap in ER but weaned quickly to NC o2  Acute resp insuficiency appears due to diastolic CHF-improved  Acute on chronic diastolic CHF-renal fxn is higher, will change to PO lasix and repeat BMP in am  Probable CKD  Possible aspiration-speech has seen, MBS done, diet orders in place, asp precautions  UTI-follow culture reports  Anemia-no signs for bleeding  Dementia-moderately advanced, not oriented to place, time  Hx of CVA with debility and unilateral weakness  IDDM uncontrolled with hyperglycemia-advance lantus and lispro dosing  Acute on chronic renal failure  Hx of PE, on anticoagulation-eliquis    Continue treatment on tele unit  Full code status      Expect return to NH after completion of treatment here-2-3 days    (CDMP query-no COPD exac)    Disposition :NH  Patient Active Problem List   Diagnosis Code    Respiratory failure ([facility] J96.90    Acute exacerbation of CHF (congestive heart failure) ([facility]) I50.9    CKD (chronic kidney disease) stage 3, GFR 30-59 ml/min ([facility]) N18.3    Dementia F03.90    History of CVA (cerebrovascular accident) Z86.73    [facility]ulin [facility] T03.3, Z79.4    UTI (urinary tract infection) N39.0       Subjective:    He is confused and asking for his wife then coffee  Denies physical complaints, but ROS UTO due to dementia    Review of systems:  As above      Vital signs/Intake and Output:  Visit Vitals  /74 (BP 1 Location: Left arm, BP Patient Position: At rest;Supine)   Pulse 84   Temp 98.4 °F (36.9 °C)   Resp 18   Ht 5' 8\" (1.727 m)   Wt 101 kg (222 lb 10.6 oz)   SpO2 100%   BMI 33.86 kg/m²     Current Shift:  No intake/output data recorded.   Last three shifts:  08/06 1901 - 08/08 0700  In: 8081 [P.O.:240; I.V.:1050]  Out: 1375 [Urine:1375]    Exam:    General: elderly pleasant demented BM, NAD  Head/Neck: NCAT, supple, No masses, No lymphadenopathy  CVS:Regular rate and rhythm, no M/R/G, S1/S2 heard, no thrill  Lungs:Clear to auscultation bilaterally, no wheezes, rhonchi, or rales  Abdomen: Soft, Nontender, No distention, Normal Bowel sounds, No hepatomegaly  Extremities: chronic stasis edema LE BL  Skin:normal texture and turgor, no rashes, no lesions  Neuro:left sided weakness  Psych:appropriate                Labs: Results:       Chemistry Recent Labs     08/08/19 0200 08/07/19  0435   * 101*    146*   K 5.1 4.5    111   CO2 30 31   BUN 51* 38*   CREA 2.63* 1.99*   CA 8.1* 8.5   AGAP 4 4   BUCR 19 19    109   TP 6.6 7.0   ALB 2.4* 2.6*   GLOB 4.2* 4.4*   AGRAT 0.6* 0.6*      CBC w/Diff Recent Labs     08/08/19 0200 08/07/19  0435   WBC 5.1 8.1   RBC 3.03* 3.40*   HGB 8.4* 9.5*   HCT 27.1* 31.0*    199   GRANS  --  82*   LYMPH  --  10*   EOS  --  2      Cardiac Enzymes Recent Labs     08/08/19 0200 08/07/19  1747   CPK 41 55   CKND1 2.4 3.1      Coagulation No results for input(s): PTP, INR, APTT in the last 72 hours. No lab exists for component: INREXT    Lipid Panel No results found for: CHOL, CHOLPOCT, CHOLX, CHLST, CHOLV, 469646, HDL, LDL, LDLC, DLDLP, 305397, VLDLC, VLDL, TGLX, TRIGL, TRIGP, TGLPOCT, CHHD, CHHDX   BNP No results for input(s): BNPP in the last 72 hours.    Liver Enzymes Recent Labs     08/08/19 0200   TP 6.6   ALB 2.4*      SGOT 13      Thyroid Studies No results found for: T4, T3U, TSH, TSHEXT     Procedures/imaging: see electronic medical records for all procedures/Xrays and details which were not copied into this note but were reviewed prior to creation of Josse Ley MD

## 2019-08-08 NOTE — PROGRESS NOTES
Problem: Dysphagia (Adult)  Goal: *Acute Goals and Plan of Care (Insert Text)  Description  Recommendations:  Diet: Puree/thin  Meds: Crushed in puree  Aspiration Precautions  Oral Care TID    Goals:  Patient will:  1. Tolerate PO trials with 0 s/s overt distress in 4/5 trials  2. Utilize compensatory swallow strategies/maneuvers (decrease bite/sip, size/rate, alt. liq/sol) with min cues in 4/5 trials  3. Perform oral-motor/laryngeal exercises to increase oropharyngeal swallow function with min cues  4. Complete an objective swallow study (i.e., MBSS) to assess swallow integrity, r/o aspiration, and determine of safest LRD, min A - goal met 8/7     Outcome: Progressing Towards Goal    SPEECH LANGUAGE PATHOLOGY DYSPHAGIA TREATMENT    Patient: Brent Lindquist (21 y.o. male)  Date: 8/8/2019  Diagnosis: Acute exacerbation of CHF (congestive heart failure) ([facility]) [I50.9]  Respiratory failure ([facility]) [J96.90] Respiratory failure ([facility])       Precautions: Aspiration    PLOF: SNF     ASSESSMENT:  Followed up with puree/thin liquid skilled meal assessment. MBSS completed 8/7 without aspiration across all consistencies. This day, A&Ox2. Demo strong cough following large gulp thin liquid via cup; required mod-maxA to reduce rate and size of intake. Recommend pt continue puree/thin liquid diet with strict aspiration precautions, 1:1 supervision, small sips/bites. Pills should be presented crushed in puree. D/w RNFernanda. SLP will continue to follow. Progression toward goals:  ?         Improving appropriately and progressing toward goals  ? Improving slowly and progressing toward goals  ? Not making progress toward goals and plan of care will be adjusted     PLAN:  Recommendations and Planned Interventions:  Puree/thin  Patient continues to benefit from skilled intervention to address the above impairments. Continue treatment per established plan of care.   Discharge Recommendations:  Uday Lindsey SUBJECTIVE:   Patient stated I want some more milk. OBJECTIVE:   Cognitive and Communication Status:  Neurologic State: Confused  Orientation Level: Oriented to person, Disoriented to place, Disoriented to situation, Disoriented to time  Cognition: Follows commands  Perception: Cues to attend to left side of body  Perseveration: Perseverates during conversation  Safety/Judgement: Awareness of environment  Dysphagia Treatment:  Oral Assessment:  Oral Assessment  Labial: No impairment  Dentition: Edentulous  Oral Hygiene: Fair  Lingual: No impairment  Velum: No impairment  Mandible: No impairment      PAIN:  Pain level pre-treatment: 0/10   Pain level post-treatment: 0/10     After treatment:   ?              Patient left in no apparent distress sitting up in chair  ? Patient left in no apparent distress in bed  ? Call bell left within reach  ? Nursing notified  ? Family present  ? Caregiver present  ? Bed alarm activated      COMMUNICATION/EDUCATION:   ? Aspiration precautions; swallow safety; compensatory techniques  ? Patient unable to participate in education; education ongoing with staff  ? Posted safety precautions in patient's room.   ? Oral-motor/laryngeal strengthening exercises      FLAQUITA Holloway  Time Calculation: 30 mins

## 2019-08-08 NOTE — PROGRESS NOTES
Transition of care: return to LTC @ [facility] when medicaly cleared  Met with patient at bedside. He is confused. He was yelling out. When asked what he needed states he was calling for his wife. Explained to him he is in the hospital.   received sbar report from Juliane Chu, she informed cm that she has spoken with son and daughter and they want patient to return to [place] where he LTC. Palliative care is involved in plan. No other needs at this time. Cm will continue to follow  Care Management Interventions  PCP Verified by CM:  Yes  Palliative Care Criteria Met (RRAT>21 & CHF Dx)?: No  Mode of Transport at Discharge: BLS  Transition of Care Consult (CM Consult): SNF, Long Term Care  Speech Therapy Consult: Yes  Current Support Network: Nursing Facility  Confirm Follow Up Transport: Family  Plan discussed with Pt/Family/Caregiver: Yes(son)  Freedom of Choice Offered: Yes  Discharge Location  Discharge Placement: [facility]

## 2019-08-08 NOTE — PROGRESS NOTES
2122- Dr David Pink was notified that Pt's blood sugar 464. New order received to administer 10 units of Lantus and cover as per sliding scale.

## 2019-08-08 NOTE — CONSULTS
[facility] Consult Note      Patient: Jacob Cogan MRN: [id number]  SSN: [id number]    YOB: 1935  Age: 81 y.o. Sex: male    Date of Consultation: 08/07/2019  Referring Physician: Matteo Fernandes MD  Reason for Consultation: CHF    Chief complain: Shortness of breath    HPI: 81year old gentleman nursing home resident brought to ER with c/o shortness of breath. He is being managed for hypoxic respiratory failure. He was given lasix and placed on BiPAP and was admitted to ICU. He was taken off BiPAP and transferred out of ICU. He is not in any respiratory distress. He denies any chest pain. He is not good historian due to underlying dementia. He had CVA with left sided weakness in December 2018. Past Medical History:   Diagnosis Date    CAD (coronary artery disease)     Chronic kidney disease     Dementia     Diabetes ([facility]     Hypertension     Stroke [facility])      History reviewed. No pertinent surgical history.   Current Facility-Administered Medications   Medication Dose Route Frequency    insulin lispro (HUMALOG) injection   SubCUTAneous AC&HS    glucose chewable tablet 16 g  16 g Oral PRN    glucagon (GLUCAGEN) injection 1 mg  1 mg IntraMUSCular PRN    acetaminophen (TYLENOL) tablet 325 mg  325 mg Oral Q6H PRN    alfuzosin SR (UROXATRAL) tablet 10 mg  10 mg Oral DAILY    amLODIPine (NORVASC) tablet 5 mg  5 mg Oral DAILY    apixaban (ELIQUIS) tablet 2.5 mg  2.5 mg Oral BID    atorvastatin (LIPITOR) tablet 40 mg  40 mg Oral DAILY    montelukast (SINGULAIR) tablet 10 mg  10 mg Oral DAILY    pregabalin (LYRICA) capsule 75 mg  75 mg Oral DAILY    raNITIdine (ZANTAC) tablet 150 mg  150 mg Oral BID    hydrALAZINE (APRESOLINE) tablet 50 mg  50 mg Oral TID    isosorbide mononitrate ER (IMDUR) tablet 60 mg  60 mg Oral DAILY    brimonidine (ALPHAGAN) 0.2 % ophthalmic solution 1 Drop  1 Drop Both Eyes BID    melatonin tablet 5 mg  5 mg Oral QHS    calcium-vitamin D (OS-JOANA) 500 mg-200 unit tablet  1 Tab Oral DAILY WITH BREAKFAST    furosemide (LASIX) injection 40 mg  40 mg IntraVENous BID    albuterol CONCENTRATE 2.5mg/0.5 mL neb soln  2.5 mg Nebulization Q4H PRN    piperacillin-tazobactam (ZOSYN) 3.375 g in 0.9% sodium chloride (MBP/ADV) 100 mL MBP  3.375 g IntraVENous Q6H       Allergies and Intolerances: Allergies   Allergen Reactions    [place] Juice Unknown (comments)    Pork Derived (Porcine) Unknown (comments)    Tomato Unknown (comments)     Pt unable to answer, dementia         Family History:   History reviewed. No pertinent family history. Social History:   He  has no tobacco history on file. He  reports that he drank alcohol. Review of Systems:     Gen: No fever, chills, malaise, weight loss/gain. Heent: No headache, rhinorrhea, epistaxis, ear pain, hearing loss, sinus pain, neck pain/stiffness, sore throat. Heart: No chest pain, palpitations, pnd, Positive shortness of breath,  orthopnea. Resp: No cough, hemoptysis, wheezing and dyspnea. GI: No nausea, vomiting, diarrhea, constipation, melena or hematochezia. : No urinary obstruction, dysuria or hematuria. Derm: No rash, new skin lesion or pruritis. Musc/skeletal: no bone or joint complains. Vasc: No edema, cyanosis or claudication. Endo: No heat/cold intolerance, no polyuria,polydipsia or polyphagia. Neuro: Positive unilateral weakness, No numbness, tingling. No seizures. Heme: No easy bruising or bleeding. Physical:   Patient Vitals for the past 6 hrs:   Temp Pulse Resp BP SpO2   08/07/19 1947 97.9 °F (36.6 °C) (!) 102 20 155/74 97 %   08/07/19 1528 97.7 °F (36.5 °C) 86 19 144/82 100 %         Exam:   General Appearance: Comfortable, not using accessory muscles of respiration. HEENT: DELONTE. HEAD: Atraumatic  NECK: No JVD, no thyroidomeglay. CAROTIDS: No bruit  LUNGS: Clear bilaterally. HEART: S1+S2 audible, no murmur, no pericardial rub.      ABD: Non-tender, BS Audible    EXT: Left lower extremity edema +, and no cyanosis. VASCULAR EXAM: Pulses are intact. PSYCHIATRIC EXAM: Mood is appropriate. MUSCULOSKELETAL: Grossly no joint deformity. NEUROLOGICAL: Alert, follows verbal commands, left upper and lower extremity weakness.     Review of Data:   LABS:   Lab Results   Component Value Date/Time    WBC 8.1 08/07/2019 04:35 AM    HGB 9.5 (L) 08/07/2019 04:35 AM    HCT 31.0 (L) 08/07/2019 04:35 AM    PLATELET 272 69/87/0136 04:35 AM     Lab Results   Component Value Date/Time    Sodium 146 (H) 08/07/2019 04:35 AM    Potassium 4.5 08/07/2019 04:35 AM    Chloride 111 08/07/2019 04:35 AM    CO2 31 08/07/2019 04:35 AM    Glucose 101 (H) 08/07/2019 04:35 AM    BUN 38 (H) 08/07/2019 04:35 AM    Creatinine 1.99 (H) 08/07/2019 04:35 AM     No results found for: CHOL, CHOLX, CHLST, CHOLV, HDL, LDL, LDLC, DLDLP, TGLX, TRIGL, TRIGP  No results found for: GPT  Lab Results   Component Value Date/Time    Hemoglobin A1c 7.1 (H) 08/07/2019 04:35 AM         Cardiology Procedures:   Results for orders placed or performed during the hospital encounter of 08/07/19   EKG, 12 LEAD, INITIAL   Result Value Ref Range    Ventricular Rate 86 BPM    Atrial Rate 86 BPM    P-R Interval 344 ms    QRS Duration 150 ms    Q-T Interval 440 ms    QTC Calculation (Bezet) 526 ms    Calculated P Axis -7 degrees    Calculated R Axis -24 degrees    Calculated T Axis 125 degrees    Diagnosis       Sinus rhythm with sinus arrhythmia with 1st degree AV block  Left bundle branch block  Abnormal ECG  Confirmed by Carla eBlla MD, Aaron Courtney ([id number]) on 8/7/2019 7:27:22 PM             Impression / Plan:    Patient Active Problem List   Diagnosis Code    Respiratory failure ([facility]) J96.90    Acute exacerbation of CHF (congestive heart failure) ([facility]) I50.9    CKD (chronic kidney disease) stage 3, GFR 30-59 ml/min ([facility]) N18.3    Dementia F03.90    History of CVA (cerebrovascular accident) Z86.73    Insulin dependent diabetes [facility] complications [facility] E11.8, Z79.4    UTI (urinary tract infection) N39.0     Acute hypoxic respiratory failure from diastolic hear failure vs aspiration   Acute on chronic diastolic heart failure  Abnormal troponin most likely due to demand ischemia   Left sided CVA  H/o Pulmonary embolism     81year old gentleman came with respiratory distress and being managed for hypoxic respiratory failure. EKG revealed Sinus rhythm with LBBB. Troponin is mildly elevated. Echocardiogram revealed Left Ventricle: Normal cavity size and systolic function (ejection fraction normal). Mild concentric hypertrophy. The estimated ejection fraction is 51 - 55%. Abnormal wall motion as described on the wall scoring diagram below. Unable to assess diastolic function. E/E' ratio is 7.78. Wall Scoring: The following segments are hypokinetic: basal inferior, basal inferolateral, mid inferior and apical inferior. All other segments are normal.   Left Atrium: Mildly dilated left atrium. Tricuspid Valve: Tricuspid valve not well visualized. No stenosis. Tricuspid regurgitation is inadequate for estimation of right ventricular systolic pressure. There is no evidence of pulmonary hypertension. [facility] from [facility] reviewed. Metoprolol was discontinued due to bradycardia. Continue IV Lasix  Continue Norvasc, Hydralazine and isosorbide mononitrate  Continue Atorvastatin  Continue Eliquis 2.5 mg twice a day. Strict I/O  Salt restriction up to 2 gm/day and fluid restriction up to 1.5 l/day  Aspiration precaution  Monitor electrolytes and renal function. Continue management as per hospital medicine.      Total time spent 45 minutes   Signed By: Александр Austin MD     August 7, 2019

## 2019-08-08 NOTE — PROGRESS NOTES
Problem: Falls - Risk of  Goal: *Absence of Falls  Description  Document Spenser Shannan Fall Risk and appropriate interventions in the flowsheet. Outcome: Progressing Towards Goal  Note:   Fall Risk Interventions:  Mobility Interventions: Assess mobility with egress test, Bed/chair exit alarm, Communicate number of staff needed for ambulation/transfer    Mentation Interventions: Adequate sleep, hydration, pain control, Door open when patient unattended, Bed/chair exit alarm    Medication Interventions: Assess postural VS orthostatic hypotension, Evaluate medications/consider consulting pharmacy, Patient to call before getting OOB    Elimination Interventions: Bed/chair exit alarm, Call light in reach, Elevated toilet seat              Problem: Patient Education: Go to Patient Education Activity  Goal: Patient/Family Education  Outcome: Progressing Towards Goal     Problem: Diabetes Self-Management  Goal: *Disease process and treatment process  Description  Define diabetes and identify own type of diabetes; list 3 options for treating diabetes. Outcome: Progressing Towards Goal  Goal: *Incorporating nutritional management into lifestyle  Description  Describe effect of type, amount and timing of food on blood glucose; list 3 methods for planning meals. Outcome: Progressing Towards Goal  Goal: *Incorporating physical activity into lifestyle  Description  State effect of exercise on blood glucose levels. Outcome: Progressing Towards Goal  Goal: *Developing strategies to promote health/change behavior  Description  Define the ABC's of diabetes; identify appropriate screenings, schedule and personal plan for screenings. Outcome: Progressing Towards Goal  Goal: *Using medications safely  Description  State effect of diabetes medications on diabetes; name diabetes medication taking, action and side effects.   Outcome: Progressing Towards Goal  Goal: *Monitoring blood glucose, interpreting and using results  Description  Identify recommended blood glucose targets  and personal targets. Outcome: Progressing Towards Goal  Goal: *Prevention, detection, treatment of acute complications  Description  List symptoms of hyper- and hypoglycemia; describe how to treat low blood sugar and actions for lowering  high blood glucose level. Outcome: Progressing Towards Goal  Goal: *Prevention, detection and treatment of chronic complications  Description  Define the natural course of diabetes and describe the relationship of blood glucose levels to long term complications of diabetes. Outcome: Progressing Towards Goal  Goal: *Developing strategies to address psychosocial issues  Description  Describe feelings about living with diabetes; identify support needed and support network  Outcome: Progressing Towards Goal  Goal: *Insulin pump training  Outcome: Progressing Towards Goal  Goal: *Sick day guidelines  Outcome: Progressing Towards Goal  Goal: *Patient Specific Goal (EDIT GOAL, INSERT TEXT)  Outcome: Progressing Towards Goal     Problem: Patient Education: Go to Patient Education Activity  Goal: Patient/Family Education  Outcome: Progressing Towards Goal     Problem: Pressure Injury - Risk of  Goal: *Prevention of pressure injury  Description  Document [place] Scale and appropriate interventions in the flowsheet.   Outcome: Progressing Towards Goal  Note:   Pressure Injury Interventions:  Sensory Interventions: Assess changes in LOC    Moisture Interventions: Absorbent underpads, Apply protective barrier, creams and emollients, Assess need for specialty bed, Check for incontinence Q2 hours and as needed    Activity Interventions: Assess need for specialty bed, Increase time out of bed, Pressure redistribution bed/mattress(bed type), PT/OT evaluation    Mobility Interventions: Assess need for specialty bed, HOB 30 degrees or less, Pressure redistribution bed/mattress (bed type), PT/OT evaluation    Nutrition Interventions: Document food/fluid/supplement intake, Discuss nutritional consult with provider    Friction and Shear Interventions: Apply protective barrier, creams and emollients, Foam dressings/transparent film/skin sealants, HOB 30 degrees or less                Problem: Patient Education: Go to Patient Education Activity  Goal: Patient/Family Education  Outcome: Progressing Towards Goal     Problem: Heart Failure: Discharge Outcomes  Goal: *Demonstrates ability to perform prescribed activity without shortness of breath or discomfort  Outcome: Progressing Towards Goal  Goal: *Describes available resources and support systems  Description  (eg: Home Health, Palliative Care, Advanced Medical Directive)  Outcome: Progressing Towards Goal  Goal: *Understands and describes signs and symptoms to report to providers(Stroke Metric)  Outcome: Progressing Towards Goal     Problem: Aspiration - Risk of  Goal: *Absence of aspiration  Outcome: Progressing Towards Goal     Problem: Patient Education: Go to Patient Education Activity  Goal: Patient/Family Education  Outcome: Progressing Towards Goal

## 2019-08-08 NOTE — PROGRESS NOTES
[facility]  Pulmonary, Critical Care, and Sleep Medicine    Name: Zahra Meter MRN: [id number]   : 1935 Hospital: [facility] [place]    Date: 2019  Room: [address] [place] [address] Note                                              Consult requesting physician: Dr. Donna Da Silva  Reason for Consult: acute respiratory failure requiring bipap    Subjective/History of Present Illness:     Patient is a 81 y.o. male with PMHx significant for HTN, DM, CAD, CKD, CVA, dementia, nursing home resident admitted with CHF exacerbation leading to acute respiratory failure requiring BiPAP, possible UTI.     2019:  Transferred out of icu. On 2 LPM O2  Leg edema improving  Not in respi distress  No cough  No overnight respi issues  Hx limited due to dementia      UOP last 24 hrs: 1375 ml    The patient is critically ill and can not provide additional history due to dementia    Review of Systems:  ROS not obtained due to patient factor. Allergies   Allergen Reactions    [place] Juice Unknown (comments)    Pork Derived (Porcine) Unknown (comments)    Tomato Unknown (comments)     Pt unable to answer, dementia        Past Medical History:   Diagnosis Date    CAD (coronary artery disease)     Chronic kidney disease     Dementia     Diabetes ([facility]     Hypertension     Stroke ([facility]       History reviewed. No pertinent surgical history. Social History     Tobacco Use    Smoking status: Unknown If Ever Smoked   Substance Use Topics    Alcohol use: Not Currently      History reviewed. No pertinent family history. Prior to Admission medications    Medication Sig Start Date End Date Taking? Authorizing Provider   albuterol (PROVENTIL VENTOLIN) 2.5 mg /3 mL (0.083 %) nebu by Nebulization route. Yes Other, MD Michael   alfuzosin SR (UROXATRAL) 10 mg SR tablet Take  by mouth daily. Yes Other, MD Michael   amLODIPine (NORVASC) 5 mg tablet Take 5 mg by mouth daily.    Yes Lanette, MD Michael atorvastatin (LIPITOR) 40 mg tablet Take 40 mg by mouth daily. Yes Lanette, MD Michael   apixaban (ELIQUIS) 2.5 mg tablet Take 2.5 mg by mouth two (2) times a day. Yes Michael Gama MD   hydrALAZINE (APRESOLINE) 50 mg tablet Take 25 mg by mouth three (3) times daily. Yes Michael Gama MD   pregabalin (LYRICA) 75 mg capsule Take 75 mg by mouth. Yes Michael Gama MD   insulin aspart U-100 (NOVOLOG) 100 unit/mL injection by SubCUTAneous route Before breakfast, lunch, dinner and at bedtime. Yes Michael Gama MD   raNITIdine (ZANTAC) 150 mg tablet Take 150 mg by mouth two (2) times a day. Yes Michael Gama MD   montelukast (SINGULAIR) 10 mg tablet Take 10 mg by mouth daily. Yes Michael Gama MD   insulin glargine U-300 conc (TOUJEO SOLOSTAR U-300 INSULIN) 300 unit/mL (1.5 mL) inpn pen by SubCUTAneous route daily. Yes Michael Gama MD   acetaminophen (TYLENOL) 650 mg TbER Take 650 mg by mouth every eight (8) hours.     Michael Gama MD     Current Facility-Administered Medications   Medication Dose Route Frequency    insulin glargine (LANTUS) injection 20 Units  20 Units SubCUTAneous QHS    insulin lispro (HUMALOG) injection 8 Units  8 Units SubCUTAneous TIDAC    furosemide (LASIX) tablet 40 mg  40 mg Oral DAILY    insulin lispro (HUMALOG) injection   SubCUTAneous AC&HS    alfuzosin SR (UROXATRAL) tablet 10 mg  10 mg Oral DAILY    amLODIPine (NORVASC) tablet 5 mg  5 mg Oral DAILY    apixaban (ELIQUIS) tablet 2.5 mg  2.5 mg Oral BID    atorvastatin (LIPITOR) tablet 40 mg  40 mg Oral DAILY    montelukast (SINGULAIR) tablet 10 mg  10 mg Oral DAILY    pregabalin (LYRICA) capsule 75 mg  75 mg Oral DAILY    raNITIdine (ZANTAC) tablet 150 mg  150 mg Oral BID    hydrALAZINE (APRESOLINE) tablet 50 mg  50 mg Oral TID    isosorbide mononitrate ER (IMDUR) tablet 60 mg  60 mg Oral DAILY    brimonidine (ALPHAGAN) 0.2 % ophthalmic solution 1 Drop  1 Drop Both Eyes BID    melatonin tablet 5 mg  5 mg Oral QHS    calcium-vitamin D (OS-JOANA) 500 mg-200 unit tablet  1 Tab Oral DAILY WITH BREAKFAST    piperacillin-tazobactam (ZOSYN) 3.375 g in 0.9% sodium chloride (MBP/ADV) 100 mL MBP  3.375 g IntraVENous Q6H         Objective:   Vital Signs:    Visit Vitals  /74 (BP 1 Location: Left arm, BP Patient Position: At rest;Supine)   Pulse 84   Temp 98.4 °F (36.9 °C)   Resp 18   Ht 5' 8\" (1.727 m)   Wt 101 kg (222 lb 10.6 oz)   SpO2 100%   BMI 33.86 kg/m²       O2 Device: Nasal cannula   O2 Flow Rate (L/min): 2 l/min   Temp (24hrs), Av °F (36.7 °C), Min:97.5 °F (36.4 °C), Max:98.4 °F (36.9 °C)       Intake/Output:   Last shift:      No intake/output data recorded. Last 3 shifts:  1901 -  0700  In: 8587 [P.O.:240; I.V.:1050]  Out: 1375 [Urine:1375]      Intake/Output Summary (Last 24 hours) at 2019 1125  Last data filed at 2019 9579  Gross per 24 hour   Intake 440 ml   Output 0 ml   Net 440 ml       Last 3 Recorded Weights in this Encounter    19 0429 19 0806 19 0423   Weight: 89.4 kg (197 lb) 89.4 kg (197 lb) 101 kg (222 lb 10.6 oz)         Recent Labs     19  0441   PHI 7.351   PCO2I 54.6*   PO2I 301*   HCO3I 30.6*   FIO2I 1.0       Physical Exam:     General/Neurology: Alert, Awake, NAD. Baseline dementia. Left weakness due to old CVA  Head:   Normocephalic, without obvious abnormality, atraumatic. Eye:   EOM intact, no scleral icterus, no pallor, no cyanosis. Throat:  Lips, mucosa, and tongue normal. No oral thrush. Neck:   Supple, symmetric. No lymphadenopathy. Trachea midline  Lung: Moderate air entry bilateral equal. No crackles. No rhonchi. No wheezing. No stridors. No prolongded expiration. No accessory muscle use. Heart:   S1 S2 present. No murmur. No JVD. Abdomen:  Soft. NT. ND. +BS. Extremities:  1+ pitting b/l pedal edema. No cyanosis. No clubbing. Pulses: 2+ and symmetric in DP.  Capillary refill: normal  Lymphatic:  No cervical or supraclavicular palpable lymphadenopathy. Musculoskeletal: No joint swelling. No tenderness. Skin:   Color, texture, turgor normal. No rashes or lesions. Data:       Recent Results (from the past 24 hour(s))   GLUCOSE, POC    Collection Time: 08/07/19 11:34 AM   Result Value Ref Range    Glucose (POC) 157 (H) 70 - 110 mg/dL   CARDIAC PANEL,(CK, CKMB & TROPONIN)    Collection Time: 08/07/19  5:47 PM   Result Value Ref Range    CK 55 39 - 308 U/L    CK - MB 1.7 <3.6 ng/ml    CK-MB Index 3.1 0.0 - 4.0 %    Troponin-I, QT 0.06 (H) 0.0 - 0.045 NG/ML   GLUCOSE, POC    Collection Time: 08/07/19  9:07 PM   Result Value Ref Range    Glucose (POC) 464 (HH) 70 - 110 mg/dL   CARDIAC PANEL,(CK, CKMB & TROPONIN)    Collection Time: 08/08/19  2:00 AM   Result Value Ref Range    CK 41 39 - 308 U/L    CK - MB 1.0 <3.6 ng/ml    CK-MB Index 2.4 0.0 - 4.0 %    Troponin-I, QT 0.05 (H) 0.0 - 8.026 NG/ML   METABOLIC PANEL, COMPREHENSIVE    Collection Time: 08/08/19  2:00 AM   Result Value Ref Range    Sodium 141 136 - 145 mmol/L    Potassium 5.1 3.5 - 5.5 mmol/L    Chloride 107 100 - 111 mmol/L    CO2 30 21 - 32 mmol/L    Anion gap 4 3.0 - 18 mmol/L    Glucose 364 (H) 74 - 99 mg/dL    BUN 51 (H) 7.0 - 18 MG/DL    Creatinine 2.63 (H) 0.6 - 1.3 MG/DL    BUN/Creatinine ratio 19 12 - 20      GFR est AA 28 (L) >60 ml/min/1.73m2    GFR est non-AA 23 (L) >60 ml/min/1.73m2    Calcium 8.1 (L) 8.5 - 10.1 MG/DL    Bilirubin, total 0.7 0.2 - 1.0 MG/DL    ALT (SGPT) 16 16 - 61 U/L    AST (SGOT) 13 10 - 38 U/L    Alk.  phosphatase 102 45 - 117 U/L    Protein, total 6.6 6.4 - 8.2 g/dL    Albumin 2.4 (L) 3.4 - 5.0 g/dL    Globulin 4.2 (H) 2.0 - 4.0 g/dL    A-G Ratio 0.6 (L) 0.8 - 1.7     CBC W/O DIFF    Collection Time: 08/08/19  2:00 AM   Result Value Ref Range    WBC 5.1 4.6 - 13.2 K/uL    RBC 3.03 (L) 4.70 - 5.50 M/uL    HGB 8.4 (L) 13.0 - 16.0 g/dL    HCT 27.1 (L) 36.0 - 48.0 %    MCV 89.4 74.0 - 97.0 FL    MCH 27.7 24.0 - 34.0 PG    MCHC 31.0 31.0 - 37.0 g/dL RDW 14.6 (H) 11.6 - 14.5 %    PLATELET 893 912 - 400 K/uL    MPV 11.5 9.2 - 11.8 FL   GLUCOSE, POC    Collection Time: 08/08/19  6:01 AM   Result Value Ref Range    Glucose (POC) 375 (H) 70 - 110 mg/dL         Chemistry Recent Labs     08/08/19  0200 08/07/19 0435   * 101*    146*   K 5.1 4.5    111   CO2 30 31   BUN 51* 38*   CREA 2.63* 1.99*   CA 8.1* 8.5   MG  --  2.3   AGAP 4 4   BUCR 19 19    109   TP 6.6 7.0   ALB 2.4* 2.6*   GLOB 4.2* 4.4*   AGRAT 0.6* 0.6*        Lactic Acid No results found for: LAC  No results for input(s): LAC in the last 72 hours. Liver Enzymes Protein, total   Date Value Ref Range Status   08/08/2019 6.6 6.4 - 8.2 g/dL Final     Albumin   Date Value Ref Range Status   08/08/2019 2.4 (L) 3.4 - 5.0 g/dL Final     Globulin   Date Value Ref Range Status   08/08/2019 4.2 (H) 2.0 - 4.0 g/dL Final     A-G Ratio   Date Value Ref Range Status   08/08/2019 0.6 (L) 0.8 - 1.7   Final     AST (SGOT)   Date Value Ref Range Status   08/08/2019 13 10 - 38 U/L Final     Comment:     PLEASE NOTE NEW REFERENCE RANGE     Alk.  phosphatase   Date Value Ref Range Status   08/08/2019 102 45 - 117 U/L Final     Recent Labs     08/08/19  0200 08/07/19 0435   TP 6.6 7.0   ALB 2.4* 2.6*   GLOB 4.2* 4.4*   AGRAT 0.6* 0.6*   SGOT 13 18    109        CBC w/Diff Recent Labs     08/08/19  0200 08/07/19 0435   WBC 5.1 8.1   RBC 3.03* 3.40*   HGB 8.4* 9.5*   HCT 27.1* 31.0*    199   GRANS  --  82*   LYMPH  --  10*   EOS  --  2        Cardiac Enzymes Lab Results   Component Value Date/Time    CPK 41 08/08/2019 02:00 AM    CPK 55 08/07/2019 05:47 PM    CKMB 1.0 08/08/2019 02:00 AM    CKMB 1.7 08/07/2019 05:47 PM    CKND1 2.4 08/08/2019 02:00 AM    CKND1 3.1 08/07/2019 05:47 PM    TROIQ 0.05 (H) 08/08/2019 02:00 AM    TROIQ 0.06 (H) 08/07/2019 05:47 PM        BNP No results found for: BNP, BNPP, XBNPT     Coagulation No results for input(s): PTP, INR, APTT in the last 72 hours.    No lab exists for component: INREXT, INREXT      Thyroid  No results found for: T4, T3U, TSH, TSHEXT, TSHEXT    No results found for: T4     Urinalysis Lab Results   Component Value Date/Time    Color DARK YELLOW 08/07/2019 05:20 AM    Appearance CLOUDY 08/07/2019 05:20 AM    Specific gravity 1.022 08/07/2019 05:20 AM    pH (UA) 5.5 08/07/2019 05:20 AM    Protein >1,000 (A) 08/07/2019 05:20 AM    Glucose NEGATIVE  08/07/2019 05:20 AM    Ketone NEGATIVE  08/07/2019 05:20 AM    Bilirubin SMALL (A) 08/07/2019 05:20 AM    Urobilinogen 1.0 08/07/2019 05:20 AM    Nitrites POSITIVE (A) 08/07/2019 05:20 AM    Leukocyte Esterase TRACE (A) 08/07/2019 05:20 AM    Epithelial cells NEGATIVE  08/07/2019 05:20 AM    Bacteria 1+ (A) 08/07/2019 05:20 AM    WBC 5 to 10 08/07/2019 05:20 AM    RBC TOO NUMEROUS TO COUNT 08/07/2019 05:20 AM        Micro  Recent Labs     08/07/19 0528 08/07/19 0435   CULT NO GROWTH 1 DAY NO GROWTH 1 DAY     Recent Labs     08/07/19 0528 08/07/19 0435   CULT NO GROWTH 1 DAY NO GROWTH 1 DAY          Culture data during this hospitalization. All Micro Results     Procedure Component Value Units Date/Time    CULTURE, BLOOD [[id number]] Collected:  08/07/19 0435    Order Status:  Completed Specimen:  Blood Updated:  08/08/19 0750     Special Requests: NO SPECIAL REQUESTS        Culture result: NO GROWTH 1 DAY       CULTURE, BLOOD [[id number]] Collected:  08/07/19 0528    Order Status:  Completed Specimen:  Blood Updated:  08/08/19 0750     Special Requests: NO SPECIAL REQUESTS        Culture result: NO GROWTH 1 DAY       CULTURE, URINE [[id number]] Collected:  08/07/19 0520    Order Status:  Completed Specimen:  Cath Urine Updated:  08/07/19 1056         ABG noted. Images report reviewed by me:  CT (Most Recent) (CT chest reviewed by me) No results found for this or any previous visit. CXR reviewed by me:  XR (Most Recent).  CXR  reviewed by me and compared with previous CXR Results from Hospital Encounter encounter on 08/07/19   XR SWALLOW [facility] VIDEO    Narrative Modified Barium swallow     History: Dysphasia, feeding difficulties    Technique: The patient orally ingested various barium materials under the  direction of a speech pathologist with direct fluoroscopic observation. Fluoroscopic time: 1.8 minutes  Fluoroscopic dose (reference air kerma): 5.91 mGy    Findings:    The patient ingested thin, nectar, pudding, and solid consistencies. No  evidence of aspiration. Trace/flash penetration occurred with thin and nectar  thickened liquids administered with either cup or straw. Premature spillage  occurred with pudding and solid consistencies. Oral and swallowing delays noted  with solid consistencies, with swallow delay noted with pudding consistency. Impression IMPRESSION:    1. Laryngeal penetration as described occurring with thin and nectar thickened  liquids. Please refer to the separate report and recommendations from the speech  pathologist.              IMPRESSION:   · Acute respiratory failure due to acute CHF exacerbation, required BiPAP initially, now on NC FiO2. · Acute CHF exacerbation. · Chronic anemia, no external bleeding. · Abnormal UA, possible UTI   ·   ·   Patient Active Problem List   Diagnosis Code    Respiratory failure ([facility]) J96.90    Acute exacerbation of CHF (congestive heart failure) ([facility]) I50.9    CKD (chronic kidney disease) stage 3, GFR 30-59 ml/min ([facility]) N18.3    Dementia F03.90    History of CVA (cerebrovascular accident) Z86.73    Insulin dependent diabetes mellitus with complications [facility] F23.3, Z79.4    UTI (urinary tract infection) N39.0       · Code status: full code per chart. RECOMMENDATIONS:   Respiratory: required bipap initially, now on NC at his baseline 2 - 2.5 lpm.   Coughing while eating. Speech eval pending. Use bipap prn. On singulair at [facility].   D/c'ed [facility]. No hx of asthma or COPD.    C/w albuterol neb prn. Keep SPO2 >=92%. HOB 30 degree elevation all the time. Aggressive pulmonary toileting. Aspiration precautions. Incentive spirometry if can follow commands. No respi distress. CVS: echo noted with preserved LVEF. Diastolic function can't be accessed. Some RWMA. Can't estimate RVSP. Elevated BNP, CXR suggestive of CHF. Diuresed with lasix. Leg edema improving. C/w lasix 40 every day and monitor creat and electrolytes. On Eliquis for some unknown reason. Defer to cardiology. Prophylaxis: DVT Prophylaxis: eliquis. GI Prophylaxis: zantac. Advance Directive/Palliative Care: d/w palliative care team, pt remains full code. Will defer respective systems problem management to primary and other respective consultant     Care of plan d/w RN, pt, palliative care team.     Moderate complexity decision making was performed during the evaluation of this patient at high risk for decompensation with multiple organ involvement. PCCM will sign off. Call us with any questions.         Brinda Golden MD  8/8/2019

## 2019-08-09 LAB
ALBUMIN SERPL-MCNC: 2.3 G/DL (ref 3.4–5)
ALBUMIN/GLOB SERPL: 0.6 {RATIO} (ref 0.8–1.7)
ALP SERPL-CCNC: 80 U/L (ref 45–117)
ALT SERPL-CCNC: 12 U/L (ref 16–61)
ANION GAP SERPL CALC-SCNC: 7 MMOL/L (ref 3–18)
AST SERPL-CCNC: 10 U/L (ref 10–38)
BACTERIA SPEC CULT: NORMAL
BILIRUB SERPL-MCNC: 0.7 MG/DL (ref 0.2–1)
BUN SERPL-MCNC: 59 MG/DL (ref 7–18)
BUN/CREAT SERPL: 21 (ref 12–20)
CALCIUM SERPL-MCNC: 8.2 MG/DL (ref 8.5–10.1)
CHLORIDE SERPL-SCNC: 107 MMOL/L (ref 100–111)
CO2 SERPL-SCNC: 30 MMOL/L (ref 21–32)
CREAT SERPL-MCNC: 2.76 MG/DL (ref 0.6–1.3)
ERYTHROCYTE [DISTWIDTH] IN BLOOD BY AUTOMATED COUNT: 14.7 % (ref 11.6–14.5)
GLOBULIN SER CALC-MCNC: 3.8 G/DL (ref 2–4)
GLUCOSE BLD STRIP.AUTO-MCNC: 168 MG/DL (ref 70–110)
GLUCOSE BLD STRIP.AUTO-MCNC: 180 MG/DL (ref 70–110)
GLUCOSE BLD STRIP.AUTO-MCNC: 213 MG/DL (ref 70–110)
GLUCOSE BLD STRIP.AUTO-MCNC: 228 MG/DL (ref 70–110)
GLUCOSE SERPL-MCNC: 172 MG/DL (ref 74–99)
HCT VFR BLD AUTO: 25.3 % (ref 36–48)
HGB BLD-MCNC: 7.9 G/DL (ref 13–16)
MCH RBC QN AUTO: 28.2 PG (ref 24–34)
MCHC RBC AUTO-ENTMCNC: 31.2 G/DL (ref 31–37)
MCV RBC AUTO: 90.4 FL (ref 74–97)
PLATELET # BLD AUTO: 182 K/UL (ref 135–420)
PMV BLD AUTO: 11.6 FL (ref 9.2–11.8)
POTASSIUM SERPL-SCNC: 4 MMOL/L (ref 3.5–5.5)
PROT SERPL-MCNC: 6.1 G/DL (ref 6.4–8.2)
RBC # BLD AUTO: 2.8 M/UL (ref 4.7–5.5)
SERVICE CMNT-IMP: NORMAL
SODIUM SERPL-SCNC: 144 MMOL/L (ref 136–145)
WBC # BLD AUTO: 7.7 K/UL (ref 4.6–13.2)

## 2019-08-09 PROCEDURE — 65660000000 HC RM CCU STEPDOWN

## 2019-08-09 PROCEDURE — 92526 ORAL FUNCTION THERAPY: CPT

## 2019-08-09 PROCEDURE — 74011250636 HC RX REV CODE- 250/636: Performed by: FAMILY MEDICINE

## 2019-08-09 PROCEDURE — 74011636637 HC RX REV CODE- 636/637: Performed by: HOSPITALIST

## 2019-08-09 PROCEDURE — 74011250637 HC RX REV CODE- 250/637: Performed by: FAMILY MEDICINE

## 2019-08-09 PROCEDURE — 82962 GLUCOSE BLOOD TEST: CPT

## 2019-08-09 PROCEDURE — 74011636637 HC RX REV CODE- 636/637: Performed by: FAMILY MEDICINE

## 2019-08-09 PROCEDURE — 74011250637 HC RX REV CODE- 250/637: Performed by: HOSPITALIST

## 2019-08-09 PROCEDURE — 74011000258 HC RX REV CODE- 258: Performed by: FAMILY MEDICINE

## 2019-08-09 PROCEDURE — 80053 COMPREHEN METABOLIC PANEL: CPT

## 2019-08-09 PROCEDURE — 77010033678 HC OXYGEN DAILY

## 2019-08-09 PROCEDURE — 36415 COLL VENOUS BLD VENIPUNCTURE: CPT

## 2019-08-09 PROCEDURE — 85027 COMPLETE CBC AUTOMATED: CPT

## 2019-08-09 RX ADMIN — CALCIUM CARBONATE 500 MG (1,250 MG)-VITAMIN D3 200 UNIT TABLET 1 TABLET: at 09:09

## 2019-08-09 RX ADMIN — PIPERACILLIN SODIUM,TAZOBACTAM SODIUM 3.38 G: 3; .375 INJECTION, POWDER, FOR SOLUTION INTRAVENOUS at 15:59

## 2019-08-09 RX ADMIN — INSULIN LISPRO 6 UNITS: 100 INJECTION, SOLUTION INTRAVENOUS; SUBCUTANEOUS at 15:57

## 2019-08-09 RX ADMIN — PIPERACILLIN SODIUM,TAZOBACTAM SODIUM 3.38 G: 3; .375 INJECTION, POWDER, FOR SOLUTION INTRAVENOUS at 22:16

## 2019-08-09 RX ADMIN — INSULIN LISPRO 8 UNITS: 100 INJECTION, SOLUTION INTRAVENOUS; SUBCUTANEOUS at 11:13

## 2019-08-09 RX ADMIN — Medication 5 MG: at 22:16

## 2019-08-09 RX ADMIN — MONTELUKAST 10 MG: 10 TABLET, FILM COATED ORAL at 09:09

## 2019-08-09 RX ADMIN — INSULIN LISPRO 3 UNITS: 100 INJECTION, SOLUTION INTRAVENOUS; SUBCUTANEOUS at 11:13

## 2019-08-09 RX ADMIN — RANITIDINE 150 MG: 150 TABLET ORAL at 09:09

## 2019-08-09 RX ADMIN — RANITIDINE 150 MG: 150 TABLET ORAL at 22:16

## 2019-08-09 RX ADMIN — INSULIN LISPRO 8 UNITS: 100 INJECTION, SOLUTION INTRAVENOUS; SUBCUTANEOUS at 15:57

## 2019-08-09 RX ADMIN — HYDRALAZINE HYDROCHLORIDE 50 MG: 50 TABLET, FILM COATED ORAL at 22:16

## 2019-08-09 RX ADMIN — BRIMONIDINE TARTRATE 1 DROP: 2 SOLUTION OPHTHALMIC at 09:08

## 2019-08-09 RX ADMIN — PIPERACILLIN SODIUM,TAZOBACTAM SODIUM 3.38 G: 3; .375 INJECTION, POWDER, FOR SOLUTION INTRAVENOUS at 04:18

## 2019-08-09 RX ADMIN — APIXABAN 2.5 MG: 2.5 TABLET, FILM COATED ORAL at 09:09

## 2019-08-09 RX ADMIN — AMLODIPINE BESYLATE 5 MG: 5 TABLET ORAL at 09:09

## 2019-08-09 RX ADMIN — INSULIN LISPRO 3 UNITS: 100 INJECTION, SOLUTION INTRAVENOUS; SUBCUTANEOUS at 06:49

## 2019-08-09 RX ADMIN — INSULIN LISPRO 6 UNITS: 100 INJECTION, SOLUTION INTRAVENOUS; SUBCUTANEOUS at 22:17

## 2019-08-09 RX ADMIN — PIPERACILLIN SODIUM,TAZOBACTAM SODIUM 3.38 G: 3; .375 INJECTION, POWDER, FOR SOLUTION INTRAVENOUS at 09:08

## 2019-08-09 RX ADMIN — HYDRALAZINE HYDROCHLORIDE 50 MG: 50 TABLET, FILM COATED ORAL at 15:58

## 2019-08-09 RX ADMIN — APIXABAN 2.5 MG: 2.5 TABLET, FILM COATED ORAL at 22:16

## 2019-08-09 RX ADMIN — ALFUZOSIN HYDROCHLORIDE 10 MG: 10 TABLET ORAL at 09:09

## 2019-08-09 RX ADMIN — HYDRALAZINE HYDROCHLORIDE 50 MG: 50 TABLET, FILM COATED ORAL at 09:09

## 2019-08-09 RX ADMIN — ATORVASTATIN CALCIUM 40 MG: 20 TABLET, FILM COATED ORAL at 09:09

## 2019-08-09 RX ADMIN — FUROSEMIDE 40 MG: 40 TABLET ORAL at 09:09

## 2019-08-09 RX ADMIN — PREGABALIN 75 MG: 75 CAPSULE ORAL at 09:09

## 2019-08-09 RX ADMIN — INSULIN LISPRO 8 UNITS: 100 INJECTION, SOLUTION INTRAVENOUS; SUBCUTANEOUS at 09:09

## 2019-08-09 RX ADMIN — INSULIN GLARGINE 20 UNITS: 100 INJECTION, SOLUTION SUBCUTANEOUS at 22:17

## 2019-08-09 RX ADMIN — ISOSORBIDE MONONITRATE 60 MG: 30 TABLET, EXTENDED RELEASE ORAL at 09:09

## 2019-08-09 NOTE — PROGRESS NOTES
Cardiology Progress Note        Patient: Jessica Mclain        Sex: male          DOA: 8/7/2019  YOB: 1935      Age:  81 y.o.        LOS:  LOS: 2 days    Patient seen and examined, chart reviewed. Assessment/Plan     Patient Active Problem List   Diagnosis Code    Respiratory failure ([facility]) J96.90    Acute exacerbation of CHF (congestive heart failure) ([facility]) I50.9    CKD (chronic kidney disease) stage 3, GFR 30-59 ml/min ([facility]) N18.3    Dementia F03.90    History of CVA (cerebrovascular accident) Z86.73    Insulin dependent diabetes mellitus with complications ([facility]) F60.2, Z79.4    UTI (urinary tract infection) N39.0      Acute hypoxic respiratory failure from diastolic hear failure vs aspiration   Acute on chronic diastolic heart failure  Abnormal troponin most likely due to demand ischemia   Left sided CVA  H/o Pulmonary embolism      Echocardiogram revealed Left Ventricle: Normal cavity size and systolic function (ejection fraction normal). Mild concentric hypertrophy. The estimated ejection fraction is 51 - 55%. Abnormal wall motion as described on the wall scoring diagram below. Unable to assess diastolic function. E/E' ratio is 7.78. Wall Scoring: The following segments are hypokinetic: basal inferior, basal inferolateral, mid inferior and apical inferior. All other segments are normal.   Left Atrium: Mildly dilated left atrium. Tricuspid Valve: Tricuspid valve not well visualized. No stenosis. Tricuspid regurgitation is inadequate for estimation of right ventricular systolic pressure. There is no evidence of pulmonary hypertension.     Plan:      Hold Lasix due to worsening of renal function. Continue Norvasc, Hydralazine and isosorbide mononitrate  Continue Atorvastatin  Continue Eliquis 2.5 mg twice a day. Strict I/O  Salt restriction up to 2 gm/day and fluid restriction up to 1.5 l/day  Aspiration precaution  Monitor electrolytes and renal function. Continue management as per hospital medicine              Subjective:    cc: My breathing is better today    REVIEW OF SYSTEMS:     General: No fevers or chills. Cardiovascular: No chest pain,No palpitations, No orthopnea, No PND, No leg swelling, No claudication  Pulmonary: No dyspnea  Gastrointestinal: No nausea, vomiting, bleeding  Neurology: No Dizziness    Objective:      Visit Vitals  /73 (BP 1 Location: Left arm, BP Patient Position: At rest)   Pulse 91   Temp 97.9 °F (36.6 °C)   Resp 18   Ht 5' 8\" (1.727 m)   Wt 108 kg (238 lb 3.2 oz)   SpO2 98%   BMI 36.22 kg/m²     Body mass index is 36.22 kg/m². Physical Exam:  General Appearance: Comfortable, not using accessory muscles of respiration. HEENT: DELONTE. HEAD: Atraumatic  NECK: No JVD, no thyroidomeglay. CAROTIDS: No bruit  LUNGS: Clear bilaterally. HEART: S1+S2 audible, no murmur, no pericardial rub. ABD: Non-tender, BS Audible    EXT: No edema, and no cyanosis.   NEUROLOGICAL: Alert, follows verbal commands, Left sided weakness  Medication:  Current Facility-Administered Medications   Medication Dose Route Frequency    insulin glargine (LANTUS) injection 20 Units  20 Units SubCUTAneous QHS    insulin lispro (HUMALOG) injection 8 Units  8 Units SubCUTAneous TIDAC    insulin lispro (HUMALOG) injection   SubCUTAneous AC&HS    glucose chewable tablet 16 g  16 g Oral PRN    glucagon (GLUCAGEN) injection 1 mg  1 mg IntraMUSCular PRN    acetaminophen (TYLENOL) tablet 325 mg  325 mg Oral Q6H PRN    alfuzosin SR (UROXATRAL) tablet 10 mg  10 mg Oral DAILY    amLODIPine (NORVASC) tablet 5 mg  5 mg Oral DAILY    apixaban (ELIQUIS) tablet 2.5 mg  2.5 mg Oral BID    atorvastatin (LIPITOR) tablet 40 mg  40 mg Oral DAILY    montelukast (SINGULAIR) tablet 10 mg  10 mg Oral DAILY    pregabalin (LYRICA) capsule 75 mg  75 mg Oral DAILY    raNITIdine (ZANTAC) tablet 150 mg  150 mg Oral BID    hydrALAZINE (APRESOLINE) tablet 50 mg  50 mg Oral TID    isosorbide mononitrate ER (IMDUR) tablet 60 mg  60 mg Oral DAILY    brimonidine (ALPHAGAN) 0.2 % ophthalmic solution 1 Drop  1 Drop Both Eyes BID    melatonin tablet 5 mg  5 mg Oral QHS    calcium-vitamin D (OS-JOANA) 500 mg-200 unit tablet  1 Tab Oral DAILY WITH BREAKFAST    albuterol CONCENTRATE 2.5mg/0.5 mL neb soln  2.5 mg Nebulization Q4H PRN    piperacillin-tazobactam (ZOSYN) 3.375 g in 0.9% sodium chloride (MBP/ADV) 100 mL MBP  3.375 g IntraVENous Q6H               Lab/Data Reviewed:       Recent Labs     08/09/19  0342 08/08/19  0200 08/07/19  0435   WBC 7.7 5.1 8.1   HGB 7.9* 8.4* 9.5*   HCT 25.3* 27.1* 31.0*    190 199     Recent Labs     08/09/19  0342 08/08/19  0200 08/07/19  0435    141 146*   K 4.0 5.1 4.5    107 111   CO2 30 30 31   * 364* 101*   BUN 59* 51* 38*   CREA 2.76* 2.63* 1.99*   CA 8.2* 8.1* 8.5       Signed By: Krysten Lares MD     August 9, 2019

## 2019-08-09 NOTE — PROGRESS NOTES
Hospitalist Progress Note    Patient: Onesimo Nissen MRN: [id number]  CSN: [id number]    YOB: 1935  Age: 81 y.o. Sex: male    DOA: 8/7/2019 LOS:  LOS: 2 days          Chief Complaint: cough          Assessment/Plan       Disposition :  Patient Active Problem List   Diagnosis Code    Respiratory failure ([facility] J96.90    Acute exacerbation of CHF (congestive heart failure) ([facility]) I50.9    CKD (chronic kidney disease) stage 3, GFR 30-59 ml/min ([facility]) N18.3    Dementia F03.90    History of CVA (cerebrovascular accident) Z86.73    Insulin dependent diabetes mellitus with complications ([facility] R61.7, Z79.4    UTI (urinary tract infection) N39.0     80 y/o admitted with acute respiratory failure and acute on chronic CHF. Concern for aspiration. CXR suggesting volume overload. CHF diastolic acute. Acute respiratory failure. Dysphagia. CKD  Dementia. UTI  Anemia.    -Diurese. -Abx for uti. -I do not see aspiration pneumonia. -SNF tomorrow. Subjective:    Feeling better. Marilyn Lao to go home    Review of systems:    Constitutional: denies fevers, chills, myalgias  Respiratory: denies SOB, cough  Cardiovascular: denies chest pain, palpitations  Gastrointestinal: denies nausea, vomiting, diarrhea      Vital signs/Intake and Output:  Visit Vitals  /73 (BP 1 Location: Left arm, BP Patient Position: At rest;Head of bed elevated (Comment degrees))   Pulse 73   Temp 98.5 °F (36.9 °C)   Resp 19   Ht 5' 8\" (1.727 m)   Wt 108 kg (238 lb 3.2 oz)   SpO2 100%   BMI 36.22 kg/m²     Current Shift:  No intake/output data recorded. Last three shifts:  08/07 1901 - 08/09 0700  In: 65 [P.O.:480; I.V.:200]  Out: 0     Exam:    General: Well developed, alert, NAD, OX3  Head/Neck: mmm  CVS:Regular rate and rhythm, no M/R/G, S1/S2 heard, no thrill  Lungs:?Rales in bases. Abdomen: Soft, bs+  Extremities: edema present.  .                 Labs: Results:       Chemistry Recent Labs     08/09/19  4560 08/08/19  0200 08/07/19  0435   * 364* 101*    141 146*   K 4.0 5.1 4.5    107 111   CO2 30 30 31   BUN 59* 51* 38*   CREA 2.76* 2.63* 1.99*   CA 8.2* 8.1* 8.5   AGAP 7 4 4   BUCR 21* 19 19   AP 80 102 109   TP 6.1* 6.6 7.0   ALB 2.3* 2.4* 2.6*   GLOB 3.8 4.2* 4.4*   AGRAT 0.6* 0.6* 0.6*      CBC w/Diff Recent Labs     08/09/19  0342 08/08/19 0200 08/07/19  0435   WBC 7.7 5.1 8.1   RBC 2.80* 3.03* 3.40*   HGB 7.9* 8.4* 9.5*   HCT 25.3* 27.1* 31.0*    190 199   GRANS  --   --  82*   LYMPH  --   --  10*   EOS  --   --  2      Cardiac Enzymes Recent Labs     08/08/19 0200 08/07/19  1747   CPK 41 55   CKND1 2.4 3.1      Coagulation No results for input(s): PTP, INR, APTT in the last 72 hours. No lab exists for component: INREXT    Lipid Panel No results found for: CHOL, CHOLPOCT, CHOLX, CHLST, CHOLV, 978399, HDL, LDL, LDLC, DLDLP, 969694, VLDLC, VLDL, TGLX, TRIGL, TRIGP, TGLPOCT, CHHD, CHHDX   BNP No results for input(s): BNPP in the last 72 hours.    Liver Enzymes Recent Labs     08/09/19  0342   TP 6.1*   ALB 2.3*   AP 80   SGOT 10      Thyroid Studies No results found for: T4, T3U, TSH, TSHEXT     Procedures/imaging: see electronic medical records for all procedures/Xrays and details which were not copied into this note but were reviewed prior to creation of Taylor Walters MD

## 2019-08-09 NOTE — ROUTINE PROCESS
Bedside and Verbal shift change report given to Bradley Bryant  (oncoming nurse) by Kirsty Pennington RN  (offgoing nurse). Report given with SBAR, Kardex, Intake/Output and Recent Results.

## 2019-08-09 NOTE — PROGRESS NOTES
2030 The patient is resting calmly in bed and his family has come to visit. 2222 The patient is agitated and is yelling for his wife. His family just left for the night. With some difficulty I was able to get him to calm and take his medications. He is adamant about going home but is unable to understand why he is in the hospital. Spent some time talking with the patient about his family and past and he became more relaxed. 1399-1826 Shift Summary: After the patient calmed he sleep the remainder of the shift. No new clinical concerns.

## 2019-08-09 NOTE — PROGRESS NOTES
Palliative medicine follow up. Patient sleeping soundly. No family at bedside. Spoke to nurse, Ede Ureña, no family has been here today. Family does not want a family meeting with palliative care team. They did provide AMD and want Full Code with all aggressive care offered. They do have contact information for PM team if they have questions or change their minds about a meeting. PM team remains available for support.      Riley Joseph RN

## 2019-08-09 NOTE — PROGRESS NOTES
Transition of care anticipate to return to where he lives LTC @ [facility] tomorrow  Met with patient he remains confused. Family has informed cm they want patient to return to [facility] when he is ready for d/c  Met with Dr. Schwartz Proper anticipate d/c to snf tomorrow. Will notify Yosef at [facility]. Patient will need transportation when ready for d/c. He lives at the [address] [place] [address] at [facility] with his wife. Cm has been informed patient does not need a UAI    [phone] telephone call with Yosef she is able to accomodate patient tomorrow if he is d/c    Transition of care to SNF: [facility]     Communication to Patient/Family:  Met with patient and family, and they are agreeable to the transition plan. SNF/Rehab Transition:  Patient has been accepted to [facility] at [address] [facility],  [address]. [address] [place] [address]., and meets criteria for admission. Patient will transported by [facility]ham and expected to leave at [facility]    Communication to SNF/Rehab:  Bedside RN,dwaine, has been notified to update the transition plan to the facility and call report Report to [phone]. Discharge information has been updated on the AVS and sent via [facility] and/or CC link. Discharge instructions to be fax'd to facility at [phone] per requests     Please include all hard scripts for controlled substances, med rec and dc summary, and AVS in packet. Please medicate for pain prior to dc if possible and needed to help offset delay when patient first arrives to facility. Reviewed and confirmed with facility, [facility]  can manage the patient care needs for the following:     Jennifer Gamez with (X) only those applicable:  Medication:  []Medications are available at the facility  []IV Antibiotics    []Controlled Substance  hard copies available sent.   []Weekly Labs    Equipment:  []CPAP/BiPAP  []Wound Vacuum  []Hernandez or Urinary Device  []PICC/Central Line  []Nebulizer  []Ventilator    Treatment:  []Isolation (for MRSA, VRE, etc.)  []Surgical Drain Management  []Tracheostomy Care  []Dressing Changes  []Dialysis with transportation  []PEG Care  []Oxygen  []Daily Weights for Heart Failure    Dietary:  []Any diet limitations  []Tube Feedings   []Total Parenteral Management (TPN)    Financial Resources:  []Medicaid Application Completed    []UAI Completed and copy given to pt/family    []A screening has previously been completed. []Level II Completed    [] Private pay individual who will not become   financially eligible for Medicaid within 6 months from admission to a [address] [place] [address] facility. [] Individual refused to have screening conducted. []Medicaid Application Completed    []The screening denied because it was determined individual did not need/did not qualify for nursing facility level of care. [] Out of state residents seeking direct admission to a [address] facility. [] Individuals who are inpatients of an out of state hospital, or in state or out of state veterans/ hospital and seek direct admission to a [address] facility  [] Individuals who are pateints or residents of a state owned/operated facility that is licensed by [employer] of [employer] ([employer]) and seek direct admission to [address] [place] [address]  [] A screening not required for enrollment in [facility] services as set out in [address] [place] [address] 10-  [] [facility]) staff shall perform screenings of the [facility] [place] [facility] clients. UAI not needed per Gayatri Briggs at [facility] patient is LTC resident   [address]:  []Surrogate Decision Maker of Care  []POA  []Communicated Code Status and copy sent. Other:       Care Management Interventions  PCP Verified by CM:  Yes  Palliative Care Criteria Met (RRAT>21 & CHF Dx)?: No  Mode of Transport at Discharge: BLS  Transition of Care Consult (CM Consult): SNF, Long Term Care  Speech Therapy Consult: Yes  Current Support Network: [address]  Confirm Follow Up Transport: Family  Plan discussed with Pt/Family/Caregiver: Yes(son)  Freedom of Choice Offered: Yes  Discharge Location  Discharge Placement: [address] [place] [address]

## 2019-08-09 NOTE — PROGRESS NOTES
Problem: Falls - Risk of  Goal: *Absence of Falls  Description  Document [place] Fall Risk and appropriate interventions in the flowsheet. Outcome: Progressing Towards Goal  Note:   Fall Risk Interventions:  Mobility Interventions: Assess mobility with egress test, Bed/chair exit alarm, Communicate number of staff needed for ambulation/transfer    Mentation Interventions: Adequate sleep, hydration, pain control, Door open when patient unattended, Bed/chair exit alarm    Medication Interventions: Assess postural VS orthostatic hypotension, Evaluate medications/consider consulting pharmacy, Patient to call before getting OOB    Elimination Interventions: Bed/chair exit alarm, Call light in reach, Elevated toilet seat              Problem: Patient Education: Go to Patient Education Activity  Goal: Patient/Family Education  Outcome: Progressing Towards Goal     Problem: Diabetes Self-Management  Goal: *Disease process and treatment process  Description  Define diabetes and identify own type of diabetes; list 3 options for treating diabetes. Outcome: Progressing Towards Goal  Goal: *Incorporating nutritional management into lifestyle  Description  Describe effect of type, amount and timing of food on blood glucose; list 3 methods for planning meals. Outcome: Progressing Towards Goal  Goal: *Incorporating physical activity into lifestyle  Description  State effect of exercise on blood glucose levels. Outcome: Progressing Towards Goal  Goal: *Developing strategies to promote health/change behavior  Description  Define the ABC's of diabetes; identify appropriate screenings, schedule and personal plan for screenings. Outcome: Progressing Towards Goal  Goal: *Using medications safely  Description  State effect of diabetes medications on diabetes; name diabetes medication taking, action and side effects.   Outcome: Progressing Towards Goal  Goal: *Monitoring blood glucose, interpreting and using results  Description  Identify recommended blood glucose targets  and personal targets. Outcome: Progressing Towards Goal  Goal: *Prevention, detection, treatment of acute complications  Description  List symptoms of hyper- and hypoglycemia; describe how to treat low blood sugar and actions for lowering  high blood glucose level. Outcome: Progressing Towards Goal  Goal: *Prevention, detection and treatment of chronic complications  Description  Define the natural course of diabetes and describe the relationship of blood glucose levels to long term complications of diabetes. Outcome: Progressing Towards Goal  Goal: *Developing strategies to address psychosocial issues  Description  Describe feelings about living with diabetes; identify support needed and support network  Outcome: Progressing Towards Goal  Goal: *Insulin pump training  Outcome: Progressing Towards Goal  Goal: *Sick day guidelines  Outcome: Progressing Towards Goal  Goal: *Patient Specific Goal (EDIT GOAL, INSERT TEXT)  Outcome: Progressing Towards Goal     Problem: Patient Education: Go to Patient Education Activity  Goal: Patient/Family Education  Outcome: Progressing Towards Goal     Problem: Pressure Injury - Risk of  Goal: *Prevention of pressure injury  Description  Document Ose[place] Scale and appropriate interventions in the flowsheet.   Outcome: Progressing Towards Goal  Note:   Pressure Injury Interventions:  Sensory Interventions: Assess changes in LOC    Moisture Interventions: Absorbent underpads, Apply protective barrier, creams and emollients, Assess need for specialty bed, Check for incontinence Q2 hours and as needed    Activity Interventions: Assess need for specialty bed, Increase time out of bed, Pressure redistribution bed/mattress(bed type), PT/OT evaluation    Mobility Interventions: Assess need for specialty bed, HOB 30 degrees or less, Pressure redistribution bed/mattress (bed type), PT/OT evaluation    Nutrition Interventions: Document food/fluid/supplement intake, Discuss nutritional consult with provider    Friction and Shear Interventions: Apply protective barrier, creams and emollients, Foam dressings/transparent film/skin sealants, HOB 30 degrees or less                Problem: Patient Education: Go to Patient Education Activity  Goal: Patient/Family Education  Outcome: Progressing Towards Goal     Problem: Heart Failure: Discharge Outcomes  Goal: *Demonstrates ability to perform prescribed activity without shortness of breath or discomfort  Outcome: Progressing Towards Goal  Goal: *Describes available resources and support systems  Description  (eg: Home Health, Palliative Care, Advanced Medical Directive)  Outcome: Progressing Towards Goal  Goal: *Understands and describes signs and symptoms to report to providers(Stroke Metric)  Outcome: Progressing Towards Goal     Problem: Aspiration - Risk of  Goal: *Absence of aspiration  Outcome: Progressing Towards Goal     Problem: Patient Education: Go to Patient Education Activity  Goal: Patient/Family Education  Outcome: Progressing Towards Goal     Problem: Patient Education: Go to Patient Education Activity  Goal: Patient/Family Education  Outcome: Progressing Towards Goal

## 2019-08-09 NOTE — PROGRESS NOTES
Problem: Dysphagia (Adult)  Goal: *Acute Goals and Plan of Care (Insert Text)  Description  Recommendations:  Diet: Puree/thin  Meds: Crushed in puree  Aspiration Precautions  Oral Care TID    Goals:  Patient will:  1. Tolerate PO trials with 0 s/s overt distress in 4/5 trials  2. Utilize compensatory swallow strategies/maneuvers (decrease bite/sip, size/rate, alt. liq/sol) with min cues in 4/5 trials  3. Perform oral-motor/laryngeal exercises to increase oropharyngeal swallow function with min cues  4. Complete an objective swallow study (i.e., MBSS) to assess swallow integrity, r/o aspiration, and determine of safest LRD, min A - goal met 8/7     Outcome: Progressing Towards Goal    SPEECH LANGUAGE PATHOLOGY DYSPHAGIA TREATMENT    Patient: Zoraida Grossman (62 y.o. male)  Date: 8/9/2019  Diagnosis: Acute exacerbation of CHF (congestive heart failure) ([facility]) [I50.9]  Respiratory failure ([facility]) [J96.90] Respiratory failure ([facility])       Precautions: Aspiration Fall  PLOF: SNF     ASSESSMENT:  Followed up with dysphagia intervention. MBSS completed 8/7 without aspiration across all consistencies. This day, A&O. Demo strong cough following large gulp thin liquid via cup despite advance verbal cues; continues to require mod-maxA to reduce rate and size of intake. Recommend pt continue puree/thin liquid diet with strict aspiration precautions, 1:1 supervision, small sips/bites. Pills should be presented crushed in puree. SLP will continue to follow. Progression toward goals:  ?         Improving appropriately and progressing toward goals  ? Improving slowly and progressing toward goals  ? Not making progress toward goals and plan of care will be adjusted     PLAN:  Recommendations and Planned Interventions:  Puree/thin  Patient continues to benefit from skilled intervention to address the above impairments. Continue treatment per established plan of care.   Discharge Recommendations:  Uday Lindsey SUBJECTIVE:   Patient stated I'm good. OBJECTIVE:   Cognitive and Communication Status:  Neurologic State: Confused  Orientation Level: Disoriented to time, Disoriented to situation, Disoriented to place, Oriented to person  Cognition: Follows commands, Decreased attention/concentration, Impaired decision making, Impulsive, Memory loss, Poor safety awareness  Perception: Cues to attend to left side of body  Perseveration: Perseverates during conversation  Safety/Judgement: Awareness of environment  Dysphagia Treatment:  Oral Assessment:  Oral Assessment  Labial: No impairment  Dentition: Edentulous  Oral Hygiene: Fair  Lingual: No impairment  Velum: No impairment  Mandible: No impairment  P.O. Trials:       Vocal quality prior to P.O.:       PAIN:  Pain level pre-treatment: 0/10   Pain level post-treatment: 0/10     After treatment:   ?              Patient left in no apparent distress sitting up in chair  ? Patient left in no apparent distress in bed  ? Call bell left within reach  ? Nursing notified  ? Family present  ? Caregiver present  ? Bed alarm activated      COMMUNICATION/EDUCATION:   ? Aspiration precautions; swallow safety; compensatory techniques  ? Patient unable to participate in education; education ongoing with staff  ? Posted safety precautions in patient's room.   ? Oral-motor/laryngeal strengthening exercises      FLAQUITA Block  Time Calculation: 14 mins

## 2019-08-09 NOTE — PROGRESS NOTES
Cardiology Progress Note        Patient: Gertie Leyden        Sex: male          DOA: 8/7/2019  YOB: 1935      Age:  81 y.o.        LOS:  LOS: 1 day    Patient seen and examined, chart reviewed. Assessment/Plan     Patient Active Problem List   Diagnosis Code    Respiratory failure ([facility]) J96.90    Acute exacerbation of CHF (congestive heart failure) ([facility]) I50.9    CKD (chronic kidney disease) stage 3, GFR 30-59 ml/min ([facility]) N18.3    Dementia F03.90    History of CVA (cerebrovascular accident) Z86.73    Insulin dependent diabetes mellitus with complications ([facility]) Y47.5, Z79.4    UTI (urinary tract infection) N39.0      Acute hypoxic respiratory failure from diastolic hear failure vs aspiration   Acute on chronic diastolic heart failure  Abnormal troponin most likely due to demand ischemia   Left sided CVA  H/o Pulmonary embolism      Echocardiogram revealed Left Ventricle: Normal cavity size and systolic function (ejection fraction normal). Mild concentric hypertrophy. The estimated ejection fraction is 51 - 55%. Abnormal wall motion as described on the wall scoring diagram below. Unable to assess diastolic function. E/E' ratio is 7.78. Wall Scoring: The following segments are hypokinetic: basal inferior, basal inferolateral, mid inferior and apical inferior. All other segments are normal.   Left Atrium: Mildly dilated left atrium. Tricuspid Valve: Tricuspid valve not well visualized. No stenosis. Tricuspid regurgitation is inadequate for estimation of right ventricular systolic pressure. There is no evidence of pulmonary hypertension.     Plan:      Agree with changing to oral Lasix. Continue Norvasc, Hydralazine and isosorbide mononitrate  Continue Atorvastatin  Continue Eliquis 2.5 mg twice a day. Strict I/O  Salt restriction up to 2 gm/day and fluid restriction up to 1.5 l/day  Aspiration precaution  Monitor electrolytes and renal function.    Continue management as per hospital medicine              Subjective:    cc: My breathing is better today    REVIEW OF SYSTEMS:     General: No fevers or chills. Cardiovascular: No chest pain,No palpitations, No orthopnea, No PND, No leg swelling, No claudication  Pulmonary: No dyspnea  Gastrointestinal: No nausea, vomiting, bleeding  Neurology: No Dizziness    Objective:      Visit Vitals  /63 (BP 1 Location: Left arm, BP Patient Position: At rest)   Pulse 85   Temp 98.6 °F (37 °C)   Resp 20   Ht 5' 8\" (1.727 m)   Wt 101 kg (222 lb 10.6 oz)   SpO2 99%   BMI 33.86 kg/m²     Body mass index is 33.86 kg/m². Physical Exam:  General Appearance: Comfortable, not using accessory muscles of respiration. HEENT: DELONTE. HEAD: Atraumatic  NECK: No JVD, no thyroidomeglay. CAROTIDS: No bruit  LUNGS: Clear bilaterally. HEART: S1+S2 audible, no murmur, no pericardial rub. ABD: Non-tender, BS Audible    EXT: No edema, and no cyanosis.   NEUROLOGICAL: Alert, follows verbal commands, Left sided weakness  Medication:  Current Facility-Administered Medications   Medication Dose Route Frequency    insulin glargine (LANTUS) injection 20 Units  20 Units SubCUTAneous QHS    insulin lispro (HUMALOG) injection 8 Units  8 Units SubCUTAneous TIDAC    furosemide (LASIX) tablet 40 mg  40 mg Oral DAILY    insulin lispro (HUMALOG) injection   SubCUTAneous AC&HS    glucose chewable tablet 16 g  16 g Oral PRN    glucagon (GLUCAGEN) injection 1 mg  1 mg IntraMUSCular PRN    acetaminophen (TYLENOL) tablet 325 mg  325 mg Oral Q6H PRN    alfuzosin SR (UROXATRAL) tablet 10 mg  10 mg Oral DAILY    amLODIPine (NORVASC) tablet 5 mg  5 mg Oral DAILY    apixaban (ELIQUIS) tablet 2.5 mg  2.5 mg Oral BID    atorvastatin (LIPITOR) tablet 40 mg  40 mg Oral DAILY    montelukast (SINGULAIR) tablet 10 mg  10 mg Oral DAILY    pregabalin (LYRICA) capsule 75 mg  75 mg Oral DAILY    raNITIdine (ZANTAC) tablet 150 mg  150 mg Oral BID    hydrALAZINE (APRESOLINE) tablet 50 mg  50 mg Oral TID    isosorbide mononitrate ER (IMDUR) tablet 60 mg  60 mg Oral DAILY    brimonidine (ALPHAGAN) 0.2 % ophthalmic solution 1 Drop  1 Drop Both Eyes BID    melatonin tablet 5 mg  5 mg Oral QHS    calcium-vitamin D (OS-JOANA) 500 mg-200 unit tablet  1 Tab Oral DAILY WITH BREAKFAST    albuterol CONCENTRATE 2.5mg/0.5 mL neb soln  2.5 mg Nebulization Q4H PRN    piperacillin-tazobactam (ZOSYN) 3.375 g in 0.9% sodium chloride (MBP/ADV) 100 mL MBP  3.375 g IntraVENous Q6H               Lab/Data Reviewed:       Recent Labs     08/08/19  0200 08/07/19  0435   WBC 5.1 8.1   HGB 8.4* 9.5*   HCT 27.1* 31.0*    199     Recent Labs     08/08/19  0200 08/07/19  0435    146*   K 5.1 4.5    111   CO2 30 31   * 101*   BUN 51* 38*   CREA 2.63* 1.99*   CA 8.1* 8.5       Signed By: Silvia Sanders MD     August 8, 2019

## 2019-08-09 NOTE — PROGRESS NOTES
0700 Bedside and Verbal shift change report given to Petrona Alston RN   (oncoming nurse) by [email or web address] (offgoing nurse). Report included the following information SBAR, Kardex, ED Summary, Intake/Output, MAR and Recent Results. 1139 Bed alarm on. Patient stating he can walk but forgot he has hx CVA left side flaccid. Patient reoriented. Denies Pain. Patient is a feeder.  90 degree angle when feeding for aspiration precautions

## 2019-08-09 NOTE — PROGRESS NOTES
1915 - Bedside and Verbal shift change report given to Magdi Mcrae RN (oncoming nurse) by Radha Pollack RN (offgoing nurse). Report included the following information SBAR, Kardex, Intake/Output, MAR, Med Rec Status and Cardiac Rhythm SR 1st Degree AV Block BBB. 1945 - Pt assessed. No acute distress at this time. Sleeping comfortably. Confused to time year and date. Makes inappropriate comments, pt redirected. See EMR for full details. 2250 - Bedside and Verbal shift change report given to Sandee Goltz RN (oncoming nurse) by Magdi Mcrae RN (offgoing nurse). Report included the following information SBAR, Kardex, Intake/Output, MAR, Accordion and Cardiac Rhythm SR BBB 1st degree AV Block.

## 2019-08-10 VITALS
RESPIRATION RATE: 18 BRPM | TEMPERATURE: 97.1 F | HEART RATE: 72 BPM | WEIGHT: 237.1 LBS | HEIGHT: 68 IN | DIASTOLIC BLOOD PRESSURE: 50 MMHG | SYSTOLIC BLOOD PRESSURE: 129 MMHG | OXYGEN SATURATION: 100 % | BODY MASS INDEX: 35.94 KG/M2

## 2019-08-10 LAB
ALBUMIN SERPL-MCNC: 2.4 G/DL (ref 3.4–5)
ALBUMIN/GLOB SERPL: 0.6 {RATIO} (ref 0.8–1.7)
ALP SERPL-CCNC: 79 U/L (ref 45–117)
ALT SERPL-CCNC: 14 U/L (ref 16–61)
ANION GAP SERPL CALC-SCNC: 9 MMOL/L (ref 3–18)
AST SERPL-CCNC: 9 U/L (ref 10–38)
BILIRUB SERPL-MCNC: 0.7 MG/DL (ref 0.2–1)
BUN SERPL-MCNC: 58 MG/DL (ref 7–18)
BUN/CREAT SERPL: 21 (ref 12–20)
CALCIUM SERPL-MCNC: 8.1 MG/DL (ref 8.5–10.1)
CHLORIDE SERPL-SCNC: 107 MMOL/L (ref 100–111)
CO2 SERPL-SCNC: 30 MMOL/L (ref 21–32)
CREAT SERPL-MCNC: 2.75 MG/DL (ref 0.6–1.3)
ERYTHROCYTE [DISTWIDTH] IN BLOOD BY AUTOMATED COUNT: 14.8 % (ref 11.6–14.5)
GLOBULIN SER CALC-MCNC: 4 G/DL (ref 2–4)
GLUCOSE BLD STRIP.AUTO-MCNC: 89 MG/DL (ref 70–110)
GLUCOSE SERPL-MCNC: 92 MG/DL (ref 74–99)
HCT VFR BLD AUTO: 28.3 % (ref 36–48)
HGB BLD-MCNC: 8.7 G/DL (ref 13–16)
MCH RBC QN AUTO: 27.8 PG (ref 24–34)
MCHC RBC AUTO-ENTMCNC: 30.7 G/DL (ref 31–37)
MCV RBC AUTO: 90.4 FL (ref 74–97)
PLATELET # BLD AUTO: 204 K/UL (ref 135–420)
PMV BLD AUTO: 11.5 FL (ref 9.2–11.8)
POTASSIUM SERPL-SCNC: 3.9 MMOL/L (ref 3.5–5.5)
PROT SERPL-MCNC: 6.4 G/DL (ref 6.4–8.2)
RBC # BLD AUTO: 3.13 M/UL (ref 4.7–5.5)
SODIUM SERPL-SCNC: 146 MMOL/L (ref 136–145)
WBC # BLD AUTO: 7.5 K/UL (ref 4.6–13.2)

## 2019-08-10 PROCEDURE — 74011250637 HC RX REV CODE- 250/637: Performed by: FAMILY MEDICINE

## 2019-08-10 PROCEDURE — 74011250636 HC RX REV CODE- 250/636: Performed by: FAMILY MEDICINE

## 2019-08-10 PROCEDURE — 74011000258 HC RX REV CODE- 258: Performed by: FAMILY MEDICINE

## 2019-08-10 PROCEDURE — 85027 COMPLETE CBC AUTOMATED: CPT

## 2019-08-10 PROCEDURE — 80053 COMPREHEN METABOLIC PANEL: CPT

## 2019-08-10 PROCEDURE — 82962 GLUCOSE BLOOD TEST: CPT

## 2019-08-10 PROCEDURE — 36415 COLL VENOUS BLD VENIPUNCTURE: CPT

## 2019-08-10 RX ORDER — AMOXICILLIN AND CLAVULANATE POTASSIUM 500; 125 MG/1; MG/1
1 TABLET, FILM COATED ORAL DAILY
Qty: 5 TAB | Refills: 0 | Status: SHIPPED
Start: 2019-08-10 | End: 2019-10-30

## 2019-08-10 RX ORDER — FUROSEMIDE 20 MG/1
20 TABLET ORAL EVERY OTHER DAY
Qty: 2 TAB | Refills: 0 | Status: ON HOLD
Start: 2019-08-10 | End: 2019-10-29 | Stop reason: SDUPTHER

## 2019-08-10 RX ORDER — BRIMONIDINE TARTRATE 2 MG/ML
1 SOLUTION/ DROPS OPHTHALMIC 2 TIMES DAILY
Qty: 1 ML | Refills: 0 | Status: SHIPPED
Start: 2019-08-10

## 2019-08-10 RX ORDER — ISOSORBIDE MONONITRATE 60 MG/1
60 TABLET, EXTENDED RELEASE ORAL DAILY
Qty: 1 TAB | Refills: 0 | Status: SHIPPED
Start: 2019-08-11 | End: 2019-10-30

## 2019-08-10 RX ADMIN — ATORVASTATIN CALCIUM 40 MG: 20 TABLET, FILM COATED ORAL at 08:37

## 2019-08-10 RX ADMIN — CALCIUM CARBONATE 500 MG (1,250 MG)-VITAMIN D3 200 UNIT TABLET 1 TABLET: at 08:36

## 2019-08-10 RX ADMIN — HYDRALAZINE HYDROCHLORIDE 50 MG: 50 TABLET, FILM COATED ORAL at 08:37

## 2019-08-10 RX ADMIN — APIXABAN 2.5 MG: 2.5 TABLET, FILM COATED ORAL at 08:36

## 2019-08-10 RX ADMIN — PREGABALIN 75 MG: 75 CAPSULE ORAL at 08:36

## 2019-08-10 RX ADMIN — BRIMONIDINE TARTRATE 1 DROP: 2 SOLUTION OPHTHALMIC at 09:00

## 2019-08-10 RX ADMIN — RANITIDINE 150 MG: 150 TABLET ORAL at 08:37

## 2019-08-10 RX ADMIN — ALFUZOSIN HYDROCHLORIDE 10 MG: 10 TABLET ORAL at 08:36

## 2019-08-10 RX ADMIN — AMLODIPINE BESYLATE 5 MG: 5 TABLET ORAL at 08:36

## 2019-08-10 RX ADMIN — PIPERACILLIN SODIUM,TAZOBACTAM SODIUM 3.38 G: 3; .375 INJECTION, POWDER, FOR SOLUTION INTRAVENOUS at 04:00

## 2019-08-10 RX ADMIN — ISOSORBIDE MONONITRATE 60 MG: 30 TABLET, EXTENDED RELEASE ORAL at 08:37

## 2019-08-10 RX ADMIN — MONTELUKAST 10 MG: 10 TABLET, FILM COATED ORAL at 08:37

## 2019-08-10 NOTE — PROGRESS NOTES
2300- Assumed patient care from off going nurse [address]. Patient lying quietly in bed and is in NAD. No requests or concerns voiced, call light is within reach, and bed is in lowest position. 0430- Patient had 6 beat of VTACH, Patient is asymptomatic and is in NAD. Will continue to monitor. 1607- Patient remained stable. Purposeful hourly rounding completed throughout the shift, NAD noted at this time, and patient is resting quietly in bed. No concerns or requests voiced. Bedside and Verbal shift change report given to Tova RN (oncoming nurse) by Feliciano Lugo RN (offgoing nurse). Report included the following information SBAR, MAR and Cardiac Rhythm NSR with 1st degree BBB.

## 2019-08-10 NOTE — PROGRESS NOTES
1636 - Bedside report received from [address]. Patient in bed at this time. Pain 0/10. Plan of care for the day addressed with the patient. Pt pleasantly confused. Hx of dementia. A&O x 1, self. Pt incontinent. At this time pt is clean and dry. Hugo Hernadez CNA, assisted pt w/ breakfast. Will continue to monitor. 0845 Assessment complete. Pt to be discharged. Pt to be transported to [place] today before 1800. [facility] to arrive @1130. Telemetry and IVs removed. Discharge summary transcription delayed. Jonh Choe, care manager,  requested transcription to be done ASAP. This RN informed [place] Discharge summary ready, but we are unable to print. Ben requested we hold onto pt until we can print Discharge summary. [facility] informed. Pt to be transported at later time today. [phone] Discharge summary ready. Printed and placed in folder. [facility] in transit. Will arrive @ 20 minutes. [address] on unit. SNF folder provided Pt to be transported to [place].

## 2019-08-10 NOTE — DISCHARGE SUMMARY
[address]    Name:  Lidya Islas  MR#:   [id number]  :  1935  ACCOUNT #:  [de-identified]  ADMIT DATE:  2019  DISCHARGE DATE:  08/10/2019    The patient is going back to the [facility] today. DISCHARGE DIAGNOSES:  1. Acute respiratory failure. 2.  Acute-on-chronic diastolic heart failure. 3.  Possible aspiration pneumonitis. 4.  Dysphagia. 5.  Chronic kidney disease. 6.  Dementia. 7.  Urinary tract infection. 8.  Anemia. HOSPITAL SUMMARY:  This is an 71-year-old gentleman who lives at [place] [address]. He came into the hospital through the emergency room, admitted for shortness of breath, new onset, congestive heart failure with exacerbation. There was concern about possible pneumonia and aspiration due to his dementia and possible dysphagia. The patient has a history of stroke with left-sided weakness. He is essentially debilitated from that. He also has dementia. He was treated for pulmonary edema with Lasix. He was started on empiric antibiotics initially, admitted to the ICU with BiPAP and then weaned off that until he was stable on nasal cannula oxygen. The patient's again history includes coronary disease, chronic kidney disease, diabetes, history of stroke, hypertension, and he has had an uneventful stay. Otherwise he has responded nicely to treatment. His breathing is markedly better. He is currently stable on low-dose nasal cannula oxygen. He is pleasantly demented, obese, elderly confused -American male in no acute distress. He is saturating 100% on 2 L nasal cannula, respiratory rate is 18, blood pressure 129/50, pulse 72, temp 97.1 this morning. Awake and alert to person, no acute distress. Lungs are clear bilaterally. Cardiac exam regular rate and rhythm. Abdomen, obese, soft, nontender. Lower extremities, 1+ edema. Distal pulses palpable, weakness left side, unchanged.   He is a diabetic with his last A1c measured at 7.1% and his blood sugars in the last 24 hours, . White count has been normal since admission. H and H stable 8.7 and 28.3. Urinalysis was positive when he came in, but urine culture is negative from the 08/07/2019. He had blood cultures also done, those are both no growth for three days and he had a MDS for swallowing that showed laryngeal penetration occurring with thin and nectar thickened liquids. Diet recommendations per Speech Therapy include dysphagia pureed with Glucerna shakes in the morning. He has been on IV antibiotics here. He has tolerated those well with transition to oral.  He has been seen by Cardiology. Their recommendations are continue Norvasc, hydralazine and Imdur, which was added; continue Lipitor, continue his Eliquis twice a day and he received IV Lasix, but we cut back on that due to some increase in his renal function, looks like at baseline he is around 2, he increased to 2.76, it was 2.75 this morning, GFR of 27% with 39 when he came into the hospital I expect that to improve and recommendations would be to continue low-dose diuretic possibly every other day for now until his renal function recovers a bit more back to baseline. DISCHARGE MEDICATIONS:  We will plan on his discharge back to the facility on Augmentin 500 mg one p.o. daily for five more days, his insulin U100 before breakfast, lunch and dinner, his Lantus daily dose that he takes in the nursing facility, Eliquis 2.5 mg twice daily, Lipitor 40 mg daily, Singulair 10 mg daily, Zantac 150 mg twice daily, Tylenol every 8 hours as needed, nebulizer treatments every 4 hours as needed, Uroxatral 10 mg daily, Norvasc 5 mg daily, hydralazine 25 mg three times daily and Lyrica 75 mg daily.   I am adding Lasix 20 mg every other day for three days and then depending on how his renal function looks in three days if he is back to around a creatinine of 2 I would maybe change it to daily just to ensure that he does not get into trouble again with volume overload. DISPOSITION:  He is medically stable for transition to the facility once arrangements could be made to go back to long-term care. He is a full code. Palliative Care was engaged here. The family did not change goals of care, 40 minutes on discharge time today. He should be on the diet as noted above with Accu-Cheks a.c.and at bedtime.       Roe Perez MD      [id number]  D:  08/10/2019 9:17  T:  08/10/2019 9:28  JOB #:  [id number]

## 2019-08-13 LAB
BACTERIA SPEC CULT: NORMAL
BACTERIA SPEC CULT: NORMAL
SERVICE CMNT-IMP: NORMAL
SERVICE CMNT-IMP: NORMAL

## 2019-08-15 ENCOUNTER — PATIENT OUTREACH (OUTPATIENT)
Dept: CASE MANAGEMENT | Age: 84
End: 2019-08-15

## 2019-08-15 NOTE — PROGRESS NOTES
Community Care Team Documentation for Patient in [facility]     Patient discharged from [facility]  to [address] [place] [address] on 8/10/19. Hospital Discharge diagnosis:      · Acute respiratory failure. · Acute-on-chronic diastolic heart failure. · Possible aspiration pneumonitis. · Dysphagia. · Chronic kidney disease. · Dementia. · Urinary tract infection. · Anemia. SNF Attending Provider:  Adriana Schmitt    Anticipated discharge date from SNF:  Patient transitioned to LTC not SNF    PCP : Heather Mcmanus MD    Nurse Navigator:      [facility] rounds completed, updates provided by facility. Brief Summary of Care:    Patient was long term care prior to admission andreturned to facility as LTC     RRAT:  High Risk            30       Total Score        3 Has Seen PCP in Last 6 Months (Yes=3, No=0)    27 Charlson Comorbidity Score (Age + Comorbid Conditions)        Criteria that do not apply:    . Living with Significant Other. Assisted Living. LTAC. SNF. or   Rehab    Patient Length of Stay (>5 days = 3)    IP Visits Last 12 Months (1-3=4, 4=9, >4=11)    Pt.  Coverage (Medicare=5 , Medicaid, or Self-Pay=4)        Active Ambulatory Problems     Diagnosis Date Noted    Respiratory failure ([address] 08/07/2019    Acute exacerbation of CHF (congestive heart failure) ([address] 08/07/2019    CKD (chronic kidney disease) stage 3, GFR 30-59 ml/min ([address] 08/07/2019    Dementia 08/07/2019    History of CVA (cerebrovascular accident) 08/07/2019    Insulin dependent diabetes mellitus with complications ([address] 70/18/1126    UTI (urinary tract infection) 08/07/2019     Resolved Ambulatory Problems     Diagnosis Date Noted    Renal insufficiency 08/07/2019     Past Medical History:   Diagnosis Date    CAD (coronary artery disease)     Chronic kidney disease     Diabetes ([address]     Hypertension     Stroke ([address]            Hemalatha DAMICO, RN, [address] [place] [address] Transition Nurse  [phone]

## 2019-08-21 ENCOUNTER — PATIENT OUTREACH (OUTPATIENT)
Dept: CASE MANAGEMENT | Age: 84
End: 2019-08-21

## 2019-08-21 NOTE — PROGRESS NOTES
Community Care Team Documentation for Patient in [facility]     Patient discharged from [facility]  to [address] [place] [address] on 8/10/19. Hospital Discharge diagnosis:      · Acute respiratory failure. · Acute-on-chronic diastolic heart failure. · Possible aspiration pneumonitis. · Dysphagia. · Chronic kidney disease. · Dementia. · Urinary tract infection. · Anemia. SNF Attending Provider:  Ayla Mccauley    Anticipated discharge date from SNF:  N/a patient will be staying LTC      PCP : Miladis Mo MD    Nurse Navigator:      [facility] rounds completed, updates provided by facility. Brief Summary of Care:    Patient was long term care prior to admission andreturned to facility as LTC     RRAT:  High Risk            30       Total Score        3 Has Seen PCP in Last 6 Months (Yes=3, No=0)    27 Charlson Comorbidity Score (Age + Comorbid Conditions)        Criteria that do not apply:    . Living with Significant Other. Assisted Living. LTAC. SNF. or   Rehab    Patient Length of Stay (>5 days = 3)    IP Visits Last 12 Months (1-3=4, 4=9, >4=11)    Pt.  Coverage (Medicare=5 , Medicaid, or Self-Pay=4)        Active Ambulatory Problems     Diagnosis Date Noted    Respiratory failure ([address] 08/07/2019    Acute exacerbation of CHF (congestive heart failure) ([address] 08/07/2019    CKD (chronic kidney disease) stage 3, GFR 30-59 ml/min ([address] 08/07/2019    Dementia 08/07/2019    History of CVA (cerebrovascular accident) 08/07/2019    Insulin dependent diabetes mellitus with complications ([address] 98/74/5773    UTI (urinary tract infection) 08/07/2019     Resolved Ambulatory Problems     Diagnosis Date Noted    Renal insufficiency 08/07/2019     Past Medical History:   Diagnosis Date    CAD (coronary artery disease)     Chronic kidney disease     Diabetes ([address]     Hypertension     Stroke ([address]            Hemalatha DAMICO, RN, [address] [place] [address] Transition Nurse  [phone]

## 2019-09-03 ENCOUNTER — PATIENT OUTREACH (OUTPATIENT)
Dept: CASE MANAGEMENT | Age: 84
End: 2019-09-03

## 2019-09-03 NOTE — PROGRESS NOTES
Community Care Team Documentation for Patient in [facility]     Patient discharged from [facility]  to [facility], on 8/10/19. Hospital Discharge diagnosis:      · Acute respiratory failure. · Acute-on-chronic diastolic heart failure. · Possible aspiration pneumonitis. · Dysphagia. · Chronic kidney disease. · Dementia. · Urinary tract infection. · Anemia. SNF Attending Provider:  Alex Howe    Anticipated discharge date from SNF:  N/a patient will be staying LTC      PCP : Juan J Talavera MD    Nurse Navigator:      [facility] rounds completed, updates provided by facility. Brief Summary of Care:    Patient was long term care prior to admission and returned to facility as LTC, weight stable, doing well. Will follow for COLLINS x 30 days. RRAT:  High Risk            30       Total Score        3 Has Seen PCP in Last 6 Months (Yes=3, No=0)    27 Charlson Comorbidity Score (Age + Comorbid Conditions)        Criteria that do not apply:    . Living with Significant Other. Assisted Living. LTAC. SNF. or   Rehab    Patient Length of Stay (>5 days = 3)    IP Visits Last 12 Months (1-3=4, 4=9, >4=11)    Pt.  Coverage (Medicare=5 , Medicaid, or Self-Pay=4)        Active Ambulatory Problems     Diagnosis Date Noted    Respiratory failure ([address] 08/07/2019    Acute exacerbation of CHF (congestive heart failure) ([address] 08/07/2019    CKD (chronic kidney disease) stage 3, GFR 30-59 ml/min ([address] 08/07/2019    Dementia 08/07/2019    History of CVA (cerebrovascular accident) 08/07/2019    Insulin dependent diabetes mellitus with complications ([address] 62/21/0541    UTI (urinary tract infection) 08/07/2019     Resolved Ambulatory Problems     Diagnosis Date Noted    Renal insufficiency 08/07/2019     Past Medical History:   Diagnosis Date    CAD (coronary artery disease)     Chronic kidney disease     Diabetes ([address]     Hypertension     Stroke ([facility]

## 2019-09-10 ENCOUNTER — PATIENT OUTREACH (OUTPATIENT)
Dept: CASE MANAGEMENT | Age: 84
End: 2019-09-10

## 2019-09-10 NOTE — PROGRESS NOTES
Community Care Team Documentation for Patient in [facility]     Patient discharged from [facility]  to [facility], on 8/10/19. Hospital Discharge diagnosis:      · Acute respiratory failure. · Acute-on-chronic diastolic heart failure. · Possible aspiration pneumonitis. · Dysphagia. · Chronic kidney disease. · Dementia. · Urinary tract infection. · Anemia. SNF Attending Provider:  Adriana Schmitt    Anticipated discharge date from SNF:  N/a patient will be staying LTC      PCP : Heather Mcmanus MD    Nurse Navigator:      [facility] rounds completed, updates provided by facility. Brief Summary of Care:    Patient was long term care prior to admission and returned to facility as LTC, weight stable, doing well. Will follow for COLLINS x 30 days. RRAT:  High Risk            30       Total Score        3 Has Seen PCP in Last 6 Months (Yes=3, No=0)    27 Charlson Comorbidity Score (Age + Comorbid Conditions)        Criteria that do not apply:    . Living with Significant Other. Assisted Living. LTAC. SNF. or   Rehab    Patient Length of Stay (>5 days = 3)    IP Visits Last 12 Months (1-3=4, 4=9, >4=11)    Pt.  Coverage (Medicare=5 , Medicaid, or Self-Pay=4)        Active Ambulatory Problems     Diagnosis Date Noted    Respiratory failure ([address] 08/07/2019    Acute exacerbation of CHF (congestive heart failure) ([address] 08/07/2019    CKD (chronic kidney disease) stage 3, GFR 30-59 ml/min ([address] 08/07/2019    Dementia 08/07/2019    History of CVA (cerebrovascular accident) 08/07/2019    Insulin dependent diabetes mellitus with complications ([address] 46/49/2245    UTI (urinary tract infection) 08/07/2019     Resolved Ambulatory Problems     Diagnosis Date Noted    Renal insufficiency 08/07/2019     Past Medical History:   Diagnosis Date    CAD (coronary artery disease)     Chronic kidney disease     Diabetes ([address]     Hypertension     Stroke ([facility]

## 2019-10-16 ENCOUNTER — HOSPITAL ENCOUNTER (INPATIENT)
Age: 84
LOS: 14 days | Discharge: NURSING, REHAB OR LONG-TERM CARE | DRG: 280 | End: 2019-10-30
Attending: EMERGENCY MEDICINE | Admitting: FAMILY MEDICINE
Payer: MEDICARE

## 2019-10-16 ENCOUNTER — APPOINTMENT (OUTPATIENT)
Dept: GENERAL RADIOLOGY | Age: 84
DRG: 280 | End: 2019-10-16
Attending: EMERGENCY MEDICINE
Payer: MEDICARE

## 2019-10-16 DIAGNOSIS — J96.01 ACUTE RESPIRATORY FAILURE WITH HYPOXIA (HCC): Primary | ICD-10-CM

## 2019-10-16 DIAGNOSIS — I50.43 ACUTE ON CHRONIC COMBINED SYSTOLIC AND DIASTOLIC CHF (CONGESTIVE HEART FAILURE) (HCC): ICD-10-CM

## 2019-10-16 DIAGNOSIS — R77.8 ELEVATED TROPONIN: ICD-10-CM

## 2019-10-16 DIAGNOSIS — F03.90 DEMENTIA WITHOUT BEHAVIORAL DISTURBANCE, UNSPECIFIED DEMENTIA TYPE: ICD-10-CM

## 2019-10-16 DIAGNOSIS — Z71.89 ADVANCED CARE PLANNING/COUNSELING DISCUSSION: ICD-10-CM

## 2019-10-16 DIAGNOSIS — R53.81 DEBILITY: ICD-10-CM

## 2019-10-16 PROBLEM — J96.00 ACUTE RESPIRATORY FAILURE (HCC): Status: ACTIVE | Noted: 2019-10-16

## 2019-10-16 PROBLEM — I50.9 CHF EXACERBATION (HCC): Status: ACTIVE | Noted: 2019-10-16

## 2019-10-16 LAB
ALBUMIN SERPL-MCNC: 3 G/DL (ref 3.4–5)
ALBUMIN/GLOB SERPL: 0.6 {RATIO} (ref 0.8–1.7)
ALP SERPL-CCNC: 139 U/L (ref 45–117)
ALT SERPL-CCNC: 21 U/L (ref 16–61)
AMORPH CRY URNS QL MICRO: ABNORMAL
ANION GAP SERPL CALC-SCNC: 6 MMOL/L (ref 3–18)
APPEARANCE UR: CLEAR
ARTERIAL PATENCY WRIST A: YES
AST SERPL-CCNC: 23 U/L (ref 10–38)
ATRIAL RATE: 96 BPM
BACTERIA URNS QL MICRO: ABNORMAL /HPF
BASE EXCESS BLD CALC-SCNC: 4 MMOL/L
BASOPHILS # BLD: 0 K/UL (ref 0–0.1)
BASOPHILS NFR BLD: 0 % (ref 0–2)
BDY SITE: ABNORMAL
BILIRUB SERPL-MCNC: 0.7 MG/DL (ref 0.2–1)
BILIRUB UR QL: NEGATIVE
BNP SERPL-MCNC: 4623 PG/ML (ref 0–1800)
BODY TEMPERATURE: 98
BUN SERPL-MCNC: 43 MG/DL (ref 7–18)
BUN/CREAT SERPL: 20 (ref 12–20)
CALCIUM SERPL-MCNC: 8.5 MG/DL (ref 8.5–10.1)
CALCULATED P AXIS, ECG09: -41 DEGREES
CALCULATED R AXIS, ECG10: -46 DEGREES
CALCULATED T AXIS, ECG11: 120 DEGREES
CHLORIDE SERPL-SCNC: 106 MMOL/L (ref 100–111)
CK MB CFR SERPL CALC: 1.9 % (ref 0–4)
CK MB SERPL-MCNC: 1.4 NG/ML (ref 5–25)
CK SERPL-CCNC: 75 U/L (ref 39–308)
CO2 SERPL-SCNC: 30 MMOL/L (ref 21–32)
COLOR UR: YELLOW
CREAT SERPL-MCNC: 2.2 MG/DL (ref 0.6–1.3)
DIAGNOSIS, 93000: NORMAL
DIFFERENTIAL METHOD BLD: ABNORMAL
EOSINOPHIL # BLD: 0.4 K/UL (ref 0–0.4)
EOSINOPHIL NFR BLD: 4 % (ref 0–5)
EPITH CASTS URNS QL MICRO: ABNORMAL /LPF (ref 0–5)
ERYTHROCYTE [DISTWIDTH] IN BLOOD BY AUTOMATED COUNT: 15.1 % (ref 11.6–14.5)
GAS FLOW.O2 O2 DELIVERY SYS: ABNORMAL L/MIN
GLOBULIN SER CALC-MCNC: 5 G/DL (ref 2–4)
GLUCOSE SERPL-MCNC: 236 MG/DL (ref 74–99)
GLUCOSE UR STRIP.AUTO-MCNC: NEGATIVE MG/DL
HCO3 BLD-SCNC: 29.8 MMOL/L (ref 22–26)
HCT VFR BLD AUTO: 31.5 % (ref 36–48)
HGB BLD-MCNC: 9.6 G/DL (ref 13–16)
HGB UR QL STRIP: ABNORMAL
KETONES UR QL STRIP.AUTO: NEGATIVE MG/DL
LACTATE BLD-SCNC: 2.22 MMOL/L (ref 0.4–2)
LEUKOCYTE ESTERASE UR QL STRIP.AUTO: NEGATIVE
LYMPHOCYTES # BLD: 1.3 K/UL (ref 0.9–3.6)
LYMPHOCYTES NFR BLD: 15 % (ref 21–52)
MCH RBC QN AUTO: 27.4 PG (ref 24–34)
MCHC RBC AUTO-ENTMCNC: 30.5 G/DL (ref 31–37)
MCV RBC AUTO: 89.7 FL (ref 74–97)
MONOCYTES # BLD: 0.6 K/UL (ref 0.05–1.2)
MONOCYTES NFR BLD: 7 % (ref 3–10)
NEUTS SEG # BLD: 6.7 K/UL (ref 1.8–8)
NEUTS SEG NFR BLD: 74 % (ref 40–73)
NITRITE UR QL STRIP.AUTO: NEGATIVE
O2/TOTAL GAS SETTING VFR VENT: 100 %
P-R INTERVAL, ECG05: 288 MS
PCO2 BLD: 54 MMHG (ref 35–45)
PEEP RESPIRATORY: 5 CMH2O
PH BLD: 7.35 [PH] (ref 7.35–7.45)
PH UR STRIP: 5 [PH] (ref 5–8)
PIP ISTAT,IPIP: 16
PLATELET # BLD AUTO: 147 K/UL (ref 135–420)
PMV BLD AUTO: 11.3 FL (ref 9.2–11.8)
PO2 BLD: 156 MMHG (ref 80–100)
POTASSIUM SERPL-SCNC: 4.7 MMOL/L (ref 3.5–5.5)
PROT SERPL-MCNC: 8 G/DL (ref 6.4–8.2)
PROT UR STRIP-MCNC: 300 MG/DL
Q-T INTERVAL, ECG07: 336 MS
QRS DURATION, ECG06: 142 MS
QTC CALCULATION (BEZET), ECG08: 424 MS
RBC # BLD AUTO: 3.51 M/UL (ref 4.7–5.5)
RBC #/AREA URNS HPF: ABNORMAL /HPF (ref 0–5)
SAO2 % BLD: 99 % (ref 92–97)
SERVICE CMNT-IMP: ABNORMAL
SODIUM SERPL-SCNC: 142 MMOL/L (ref 136–145)
SP GR UR REFRACTOMETRY: 1.02 (ref 1–1.03)
SPECIMEN TYPE: ABNORMAL
SPONTANEOUS TIMED, IST: YES
TOTAL RESP. RATE, ITRR: 24
TROPONIN I SERPL-MCNC: 0.37 NG/ML (ref 0–0.04)
UROBILINOGEN UR QL STRIP.AUTO: 1 EU/DL (ref 0.2–1)
VENTRICULAR RATE, ECG03: 96 BPM
WBC # BLD AUTO: 9.1 K/UL (ref 4.6–13.2)
WBC URNS QL MICRO: ABNORMAL /HPF (ref 0–5)

## 2019-10-16 PROCEDURE — 77030035694 HC MSK BIPAP FLL FAC PERFMAX PHIL -B

## 2019-10-16 PROCEDURE — 5A09357 ASSISTANCE WITH RESPIRATORY VENTILATION, LESS THAN 24 CONSECUTIVE HOURS, CONTINUOUS POSITIVE AIRWAY PRESSURE: ICD-10-PCS | Performed by: FAMILY MEDICINE

## 2019-10-16 PROCEDURE — 65610000006 HC RM INTENSIVE CARE

## 2019-10-16 PROCEDURE — 99285 EMERGENCY DEPT VISIT HI MDM: CPT

## 2019-10-16 PROCEDURE — 83605 ASSAY OF LACTIC ACID: CPT

## 2019-10-16 PROCEDURE — 85025 COMPLETE CBC W/AUTO DIFF WBC: CPT

## 2019-10-16 PROCEDURE — 5A09457 ASSISTANCE WITH RESPIRATORY VENTILATION, 24-96 CONSECUTIVE HOURS, CONTINUOUS POSITIVE AIRWAY PRESSURE: ICD-10-PCS | Performed by: FAMILY MEDICINE

## 2019-10-16 PROCEDURE — 87040 BLOOD CULTURE FOR BACTERIA: CPT

## 2019-10-16 PROCEDURE — 74011250636 HC RX REV CODE- 250/636: Performed by: EMERGENCY MEDICINE

## 2019-10-16 PROCEDURE — 80053 COMPREHEN METABOLIC PANEL: CPT

## 2019-10-16 PROCEDURE — 74011000250 HC RX REV CODE- 250: Performed by: EMERGENCY MEDICINE

## 2019-10-16 PROCEDURE — 94660 CPAP INITIATION&MGMT: CPT

## 2019-10-16 PROCEDURE — 96375 TX/PRO/DX INJ NEW DRUG ADDON: CPT

## 2019-10-16 PROCEDURE — 82803 BLOOD GASES ANY COMBINATION: CPT

## 2019-10-16 PROCEDURE — 74011250637 HC RX REV CODE- 250/637: Performed by: EMERGENCY MEDICINE

## 2019-10-16 PROCEDURE — 93005 ELECTROCARDIOGRAM TRACING: CPT

## 2019-10-16 PROCEDURE — 94762 N-INVAS EAR/PLS OXIMTRY CONT: CPT

## 2019-10-16 PROCEDURE — 82550 ASSAY OF CK (CPK): CPT

## 2019-10-16 PROCEDURE — 96374 THER/PROPH/DIAG INJ IV PUSH: CPT

## 2019-10-16 PROCEDURE — 71045 X-RAY EXAM CHEST 1 VIEW: CPT

## 2019-10-16 PROCEDURE — 81001 URINALYSIS AUTO W/SCOPE: CPT

## 2019-10-16 PROCEDURE — 94640 AIRWAY INHALATION TREATMENT: CPT

## 2019-10-16 PROCEDURE — 83880 ASSAY OF NATRIURETIC PEPTIDE: CPT

## 2019-10-16 PROCEDURE — 51702 INSERT TEMP BLADDER CATH: CPT

## 2019-10-16 PROCEDURE — 36600 WITHDRAWAL OF ARTERIAL BLOOD: CPT

## 2019-10-16 PROCEDURE — 74011000258 HC RX REV CODE- 258: Performed by: EMERGENCY MEDICINE

## 2019-10-16 RX ORDER — FACIAL-BODY WIPES
10 EACH TOPICAL
Status: DISCONTINUED | OUTPATIENT
Start: 2019-10-16 | End: 2019-10-17

## 2019-10-16 RX ORDER — SODIUM CHLORIDE 0.9 % (FLUSH) 0.9 %
5-40 SYRINGE (ML) INJECTION EVERY 8 HOURS
Status: DISCONTINUED | OUTPATIENT
Start: 2019-10-16 | End: 2019-10-30 | Stop reason: HOSPADM

## 2019-10-16 RX ORDER — HEPARIN SODIUM 5000 [USP'U]/ML
5000 INJECTION, SOLUTION INTRAVENOUS; SUBCUTANEOUS ONCE
Status: COMPLETED | OUTPATIENT
Start: 2019-10-17 | End: 2019-10-17

## 2019-10-16 RX ORDER — SODIUM CHLORIDE 0.9 % (FLUSH) 0.9 %
5-10 SYRINGE (ML) INJECTION AS NEEDED
Status: DISCONTINUED | OUTPATIENT
Start: 2019-10-16 | End: 2019-10-30 | Stop reason: HOSPADM

## 2019-10-16 RX ORDER — ONDANSETRON 2 MG/ML
4 INJECTION INTRAMUSCULAR; INTRAVENOUS
Status: COMPLETED | OUTPATIENT
Start: 2019-10-16 | End: 2019-10-16

## 2019-10-16 RX ORDER — INSULIN LISPRO 100 [IU]/ML
INJECTION, SOLUTION INTRAVENOUS; SUBCUTANEOUS EVERY 6 HOURS
Status: DISCONTINUED | OUTPATIENT
Start: 2019-10-16 | End: 2019-10-19

## 2019-10-16 RX ORDER — DEXTROSE MONOHYDRATE 100 MG/ML
125-250 INJECTION, SOLUTION INTRAVENOUS AS NEEDED
Status: DISCONTINUED | OUTPATIENT
Start: 2019-10-16 | End: 2019-10-30 | Stop reason: HOSPADM

## 2019-10-16 RX ORDER — SODIUM CHLORIDE 0.9 % (FLUSH) 0.9 %
5-40 SYRINGE (ML) INJECTION AS NEEDED
Status: DISCONTINUED | OUTPATIENT
Start: 2019-10-16 | End: 2019-10-24 | Stop reason: SDUPTHER

## 2019-10-16 RX ORDER — FUROSEMIDE 10 MG/ML
80 INJECTION INTRAMUSCULAR; INTRAVENOUS
Status: COMPLETED | OUTPATIENT
Start: 2019-10-16 | End: 2019-10-16

## 2019-10-16 RX ORDER — IPRATROPIUM BROMIDE AND ALBUTEROL SULFATE 2.5; .5 MG/3ML; MG/3ML
3 SOLUTION RESPIRATORY (INHALATION)
Status: COMPLETED | OUTPATIENT
Start: 2019-10-16 | End: 2019-10-16

## 2019-10-16 RX ORDER — MAGNESIUM SULFATE 100 %
16 CRYSTALS MISCELLANEOUS AS NEEDED
Status: DISCONTINUED | OUTPATIENT
Start: 2019-10-16 | End: 2019-10-30 | Stop reason: HOSPADM

## 2019-10-16 RX ORDER — MORPHINE SULFATE 2 MG/ML
2 INJECTION, SOLUTION INTRAMUSCULAR; INTRAVENOUS
Status: COMPLETED | OUTPATIENT
Start: 2019-10-16 | End: 2019-10-16

## 2019-10-16 RX ORDER — HYDRALAZINE HYDROCHLORIDE 20 MG/ML
10 INJECTION INTRAMUSCULAR; INTRAVENOUS
Status: DISCONTINUED | OUTPATIENT
Start: 2019-10-16 | End: 2019-10-30 | Stop reason: HOSPADM

## 2019-10-16 RX ADMIN — Medication 10 ML: at 22:06

## 2019-10-16 RX ADMIN — IPRATROPIUM BROMIDE AND ALBUTEROL SULFATE 3 ML: .5; 3 SOLUTION RESPIRATORY (INHALATION) at 21:23

## 2019-10-16 RX ADMIN — Medication 10 ML: at 22:08

## 2019-10-16 RX ADMIN — MORPHINE SULFATE 2 MG: 2 INJECTION, SOLUTION INTRAMUSCULAR; INTRAVENOUS at 22:09

## 2019-10-16 RX ADMIN — PIPERACILLIN SODIUM,TAZOBACTAM SODIUM 3.38 G: 3; .375 INJECTION, POWDER, FOR SOLUTION INTRAVENOUS at 21:57

## 2019-10-16 RX ADMIN — ONDANSETRON 4 MG: 2 INJECTION INTRAMUSCULAR; INTRAVENOUS at 22:08

## 2019-10-16 RX ADMIN — FUROSEMIDE 80 MG: 10 INJECTION, SOLUTION INTRAMUSCULAR; INTRAVENOUS at 22:02

## 2019-10-16 RX ADMIN — NITROGLYCERIN 1 INCH: 20 OINTMENT TOPICAL at 21:56

## 2019-10-17 ENCOUNTER — APPOINTMENT (OUTPATIENT)
Dept: CT IMAGING | Age: 84
DRG: 280 | End: 2019-10-17
Attending: INTERNAL MEDICINE
Payer: MEDICARE

## 2019-10-17 ENCOUNTER — APPOINTMENT (OUTPATIENT)
Dept: NON INVASIVE DIAGNOSTICS | Age: 84
DRG: 280 | End: 2019-10-17
Attending: FAMILY MEDICINE
Payer: MEDICARE

## 2019-10-17 PROBLEM — I21.4 NSTEMI (NON-ST ELEVATED MYOCARDIAL INFARCTION) (HCC): Status: ACTIVE | Noted: 2019-10-17

## 2019-10-17 LAB
ALBUMIN SERPL-MCNC: 3 G/DL (ref 3.4–5)
ALBUMIN/GLOB SERPL: 0.6 {RATIO} (ref 0.8–1.7)
ALP SERPL-CCNC: 131 U/L (ref 45–117)
ALT SERPL-CCNC: 20 U/L (ref 16–61)
ANION GAP SERPL CALC-SCNC: 3 MMOL/L (ref 3–18)
ARTERIAL PATENCY WRIST A: YES
AST SERPL-CCNC: 23 U/L (ref 10–38)
BASE EXCESS BLD CALC-SCNC: 7 MMOL/L
BDY SITE: ABNORMAL
BILIRUB SERPL-MCNC: 0.7 MG/DL (ref 0.2–1)
BUN SERPL-MCNC: 45 MG/DL (ref 7–18)
BUN/CREAT SERPL: 20 (ref 12–20)
CALCIUM SERPL-MCNC: 8.5 MG/DL (ref 8.5–10.1)
CHLORIDE SERPL-SCNC: 106 MMOL/L (ref 100–111)
CK MB CFR SERPL CALC: 2.5 % (ref 0–4)
CK MB CFR SERPL CALC: 3 % (ref 0–4)
CK MB CFR SERPL CALC: 5 % (ref 0–4)
CK MB SERPL-MCNC: 1.8 NG/ML (ref 5–25)
CK MB SERPL-MCNC: 2.1 NG/ML (ref 5–25)
CK MB SERPL-MCNC: 4.9 NG/ML (ref 5–25)
CK SERPL-CCNC: 70 U/L (ref 39–308)
CK SERPL-CCNC: 72 U/L (ref 39–308)
CK SERPL-CCNC: 98 U/L (ref 39–308)
CO2 SERPL-SCNC: 33 MMOL/L (ref 21–32)
CREAT SERPL-MCNC: 2.23 MG/DL (ref 0.6–1.3)
ERYTHROCYTE [DISTWIDTH] IN BLOOD BY AUTOMATED COUNT: 15 % (ref 11.6–14.5)
GAS FLOW.O2 O2 DELIVERY SYS: ABNORMAL L/MIN
GLOBULIN SER CALC-MCNC: 4.8 G/DL (ref 2–4)
GLUCOSE BLD STRIP.AUTO-MCNC: 120 MG/DL (ref 70–110)
GLUCOSE BLD STRIP.AUTO-MCNC: 152 MG/DL (ref 70–110)
GLUCOSE BLD STRIP.AUTO-MCNC: 223 MG/DL (ref 70–110)
GLUCOSE BLD STRIP.AUTO-MCNC: 56 MG/DL (ref 70–110)
GLUCOSE BLD STRIP.AUTO-MCNC: 79 MG/DL (ref 70–110)
GLUCOSE BLD STRIP.AUTO-MCNC: 98 MG/DL (ref 70–110)
GLUCOSE SERPL-MCNC: 138 MG/DL (ref 74–99)
HCO3 BLD-SCNC: 31.8 MMOL/L (ref 22–26)
HCT VFR BLD AUTO: 30.9 % (ref 36–48)
HGB BLD-MCNC: 9.5 G/DL (ref 13–16)
LACTATE SERPL-SCNC: 0.5 MMOL/L (ref 0.4–2)
MAGNESIUM SERPL-MCNC: 2.1 MG/DL (ref 1.6–2.6)
MCH RBC QN AUTO: 27.6 PG (ref 24–34)
MCHC RBC AUTO-ENTMCNC: 30.7 G/DL (ref 31–37)
MCV RBC AUTO: 89.8 FL (ref 74–97)
O2/TOTAL GAS SETTING VFR VENT: 35 %
PCO2 BLD: 51.3 MMHG (ref 35–45)
PEEP RESPIRATORY: 5 CMH2O
PH BLD: 7.4 [PH] (ref 7.35–7.45)
PIP ISTAT,IPIP: 16
PLATELET # BLD AUTO: 143 K/UL (ref 135–420)
PMV BLD AUTO: 10.9 FL (ref 9.2–11.8)
PO2 BLD: 83 MMHG (ref 80–100)
POTASSIUM SERPL-SCNC: 4.7 MMOL/L (ref 3.5–5.5)
PROT SERPL-MCNC: 7.8 G/DL (ref 6.4–8.2)
RBC # BLD AUTO: 3.44 M/UL (ref 4.7–5.5)
SAO2 % BLD: 96 % (ref 92–97)
SERVICE CMNT-IMP: ABNORMAL
SODIUM SERPL-SCNC: 142 MMOL/L (ref 136–145)
SPECIMEN TYPE: ABNORMAL
SPONTANEOUS TIMED, IST: YES
TOTAL RESP. RATE, ITRR: 22
TROPONIN I SERPL-MCNC: 0.4 NG/ML (ref 0–0.04)
TROPONIN I SERPL-MCNC: 0.53 NG/ML (ref 0–0.04)
TROPONIN I SERPL-MCNC: 1.76 NG/ML (ref 0–0.04)
WBC # BLD AUTO: 15.4 K/UL (ref 4.6–13.2)

## 2019-10-17 PROCEDURE — 36600 WITHDRAWAL OF ARTERIAL BLOOD: CPT

## 2019-10-17 PROCEDURE — 82962 GLUCOSE BLOOD TEST: CPT

## 2019-10-17 PROCEDURE — 76450000000

## 2019-10-17 PROCEDURE — 74011636637 HC RX REV CODE- 636/637: Performed by: FAMILY MEDICINE

## 2019-10-17 PROCEDURE — 74011000250 HC RX REV CODE- 250: Performed by: INTERNAL MEDICINE

## 2019-10-17 PROCEDURE — 82550 ASSAY OF CK (CPK): CPT

## 2019-10-17 PROCEDURE — 85027 COMPLETE CBC AUTOMATED: CPT

## 2019-10-17 PROCEDURE — 94660 CPAP INITIATION&MGMT: CPT

## 2019-10-17 PROCEDURE — 87070 CULTURE OTHR SPECIMN AEROBIC: CPT

## 2019-10-17 PROCEDURE — 70450 CT HEAD/BRAIN W/O DYE: CPT

## 2019-10-17 PROCEDURE — 74011000258 HC RX REV CODE- 258: Performed by: HOSPITALIST

## 2019-10-17 PROCEDURE — 83735 ASSAY OF MAGNESIUM: CPT

## 2019-10-17 PROCEDURE — 82803 BLOOD GASES ANY COMBINATION: CPT

## 2019-10-17 PROCEDURE — C9113 INJ PANTOPRAZOLE SODIUM, VIA: HCPCS | Performed by: FAMILY MEDICINE

## 2019-10-17 PROCEDURE — 36415 COLL VENOUS BLD VENIPUNCTURE: CPT

## 2019-10-17 PROCEDURE — 74011250636 HC RX REV CODE- 250/636: Performed by: FAMILY MEDICINE

## 2019-10-17 PROCEDURE — 74011250637 HC RX REV CODE- 250/637: Performed by: FAMILY MEDICINE

## 2019-10-17 PROCEDURE — 87086 URINE CULTURE/COLONY COUNT: CPT

## 2019-10-17 PROCEDURE — 65610000006 HC RM INTENSIVE CARE

## 2019-10-17 PROCEDURE — 77010033678 HC OXYGEN DAILY

## 2019-10-17 PROCEDURE — 94640 AIRWAY INHALATION TREATMENT: CPT

## 2019-10-17 PROCEDURE — 80053 COMPREHEN METABOLIC PANEL: CPT

## 2019-10-17 PROCEDURE — 74011000258 HC RX REV CODE- 258: Performed by: FAMILY MEDICINE

## 2019-10-17 PROCEDURE — 74011250636 HC RX REV CODE- 250/636: Performed by: HOSPITALIST

## 2019-10-17 PROCEDURE — 87077 CULTURE AEROBIC IDENTIFY: CPT

## 2019-10-17 PROCEDURE — 83605 ASSAY OF LACTIC ACID: CPT

## 2019-10-17 PROCEDURE — 74011000250 HC RX REV CODE- 250: Performed by: FAMILY MEDICINE

## 2019-10-17 RX ORDER — IPRATROPIUM BROMIDE AND ALBUTEROL SULFATE 2.5; .5 MG/3ML; MG/3ML
3 SOLUTION RESPIRATORY (INHALATION)
Status: DISCONTINUED | OUTPATIENT
Start: 2019-10-17 | End: 2019-10-30 | Stop reason: HOSPADM

## 2019-10-17 RX ORDER — MONTELUKAST SODIUM 10 MG/1
10 TABLET ORAL DAILY
Status: DISCONTINUED | OUTPATIENT
Start: 2019-10-17 | End: 2019-10-17

## 2019-10-17 RX ORDER — AMLODIPINE BESYLATE 5 MG/1
5 TABLET ORAL DAILY
Status: DISCONTINUED | OUTPATIENT
Start: 2019-10-17 | End: 2019-10-18

## 2019-10-17 RX ORDER — ATORVASTATIN CALCIUM 20 MG/1
40 TABLET, FILM COATED ORAL DAILY
Status: DISCONTINUED | OUTPATIENT
Start: 2019-10-17 | End: 2019-10-30 | Stop reason: HOSPADM

## 2019-10-17 RX ORDER — ISOSORBIDE MONONITRATE 60 MG/1
60 TABLET, EXTENDED RELEASE ORAL DAILY
Status: DISCONTINUED | OUTPATIENT
Start: 2019-10-17 | End: 2019-10-23

## 2019-10-17 RX ORDER — FUROSEMIDE 10 MG/ML
40 INJECTION INTRAMUSCULAR; INTRAVENOUS 2 TIMES DAILY
Status: DISCONTINUED | OUTPATIENT
Start: 2019-10-17 | End: 2019-10-19

## 2019-10-17 RX ORDER — PREGABALIN 75 MG/1
75 CAPSULE ORAL DAILY
Status: DISCONTINUED | OUTPATIENT
Start: 2019-10-17 | End: 2019-10-17

## 2019-10-17 RX ORDER — HYDRALAZINE HYDROCHLORIDE 20 MG/ML
20 INJECTION INTRAMUSCULAR; INTRAVENOUS EVERY 6 HOURS
Status: DISCONTINUED | OUTPATIENT
Start: 2019-10-17 | End: 2019-10-18

## 2019-10-17 RX ORDER — ALFUZOSIN HYDROCHLORIDE 10 MG/1
10 TABLET, EXTENDED RELEASE ORAL DAILY
Status: DISCONTINUED | OUTPATIENT
Start: 2019-10-17 | End: 2019-10-23

## 2019-10-17 RX ORDER — HYDRALAZINE HYDROCHLORIDE 25 MG/1
25 TABLET, FILM COATED ORAL 3 TIMES DAILY
Status: DISCONTINUED | OUTPATIENT
Start: 2019-10-17 | End: 2019-10-17

## 2019-10-17 RX ADMIN — Medication 10 ML: at 13:04

## 2019-10-17 RX ADMIN — FUROSEMIDE 40 MG: 10 INJECTION, SOLUTION INTRAMUSCULAR; INTRAVENOUS at 20:53

## 2019-10-17 RX ADMIN — HYDRALAZINE HYDROCHLORIDE 20 MG: 20 INJECTION INTRAMUSCULAR; INTRAVENOUS at 13:09

## 2019-10-17 RX ADMIN — DEXTROSE MONOHYDRATE 250 ML: 10 INJECTION, SOLUTION INTRAVENOUS at 17:37

## 2019-10-17 RX ADMIN — APIXABAN 2.5 MG: 2.5 TABLET, FILM COATED ORAL at 04:33

## 2019-10-17 RX ADMIN — APIXABAN 2.5 MG: 2.5 TABLET, FILM COATED ORAL at 20:53

## 2019-10-17 RX ADMIN — HEPARIN SODIUM 5000 UNITS: 5000 INJECTION INTRAVENOUS; SUBCUTANEOUS at 00:34

## 2019-10-17 RX ADMIN — DEXTROSE MONOHYDRATE 250 ML: 10 INJECTION, SOLUTION INTRAVENOUS at 12:09

## 2019-10-17 RX ADMIN — HYDRALAZINE HYDROCHLORIDE 20 MG: 20 INJECTION INTRAMUSCULAR; INTRAVENOUS at 17:58

## 2019-10-17 RX ADMIN — IPRATROPIUM BROMIDE AND ALBUTEROL SULFATE 3 ML: .5; 3 SOLUTION RESPIRATORY (INHALATION) at 11:29

## 2019-10-17 RX ADMIN — VANCOMYCIN HYDROCHLORIDE 1500 MG: 1.5 INJECTION, POWDER, LYOPHILIZED, FOR SOLUTION INTRAVENOUS at 12:05

## 2019-10-17 RX ADMIN — HYDRALAZINE HYDROCHLORIDE 10 MG: 20 INJECTION INTRAMUSCULAR; INTRAVENOUS at 00:34

## 2019-10-17 RX ADMIN — FUROSEMIDE 40 MG: 10 INJECTION, SOLUTION INTRAMUSCULAR; INTRAVENOUS at 12:05

## 2019-10-17 RX ADMIN — Medication 10 ML: at 21:33

## 2019-10-17 RX ADMIN — PANTOPRAZOLE SODIUM 40 MG: 40 INJECTION, POWDER, FOR SOLUTION INTRAVENOUS at 09:23

## 2019-10-17 RX ADMIN — HYDRALAZINE HYDROCHLORIDE 10 MG: 20 INJECTION INTRAMUSCULAR; INTRAVENOUS at 06:27

## 2019-10-17 RX ADMIN — PIPERACILLIN AND TAZOBACTAM 3.38 G: 3; .375 INJECTION, POWDER, LYOPHILIZED, FOR SOLUTION INTRAVENOUS at 09:23

## 2019-10-17 RX ADMIN — Medication 10 ML: at 05:10

## 2019-10-17 RX ADMIN — PIPERACILLIN AND TAZOBACTAM 3.38 G: 3; .375 INJECTION, POWDER, LYOPHILIZED, FOR SOLUTION INTRAVENOUS at 17:58

## 2019-10-17 RX ADMIN — INSULIN LISPRO 4 UNITS: 100 INJECTION, SOLUTION INTRAVENOUS; SUBCUTANEOUS at 00:34

## 2019-10-17 RX ADMIN — Medication 10 ML: at 00:37

## 2019-10-17 NOTE — PROGRESS NOTES
@2354 Pt admitted from ED on BIPAP alert and oriented to self with intermittent confusion. Hernandez insitu draining clear yellow urine. SCD's on bilateral feet well tolerated. CHG bath completed . Assessment done and charted in relevant flow sheets . Unable to full out admission data base as pt confused and no relatives present at this time. Nursing management continues. @1676 pt is continuously taking off BIPAP  Even though he was explained his importance. RT at bedside. @1076 pt reassessed no  Changes. @0500 pt asleep intermittently vital signs fluctuates . Nursing management continues. @8400 Bedside and Verbal shift change report given to Magda Marcelino (oncoming nurse) by Peg Mathis (offgoing nurse). Report included the following information SBAR, Kardex, Intake/Output, MAR, Recent Results, Med Rec Status and Alarm Parameters .

## 2019-10-17 NOTE — PROGRESS NOTES
Pharmacy Dosing Services: Vancomycin    Consult for Vancomycin Dosing by Pharmacy by Dr. Trupti Landers provided for this 81y.o. year old male , for indication of pneumonia (HAP)  Day of Therapy 1 of 7    Ht Readings from Last 1 Encounters:   08/07/19 172.7 cm (68\")        Wt Readings from Last 1 Encounters:   10/16/19 102.1 kg (225 lb)        Other Current Antibiotics Zosyn 3.375 grams IV q8h   Significant Cultures pending   Serum Creatinine Lab Results   Component Value Date/Time    Creatinine 2.23 (H) 10/17/2019 03:08 AM        Creatinine Clearance CrCl cannot be calculated (Unknown ideal weight.). BUN Lab Results   Component Value Date/Time    BUN 45 (H) 10/17/2019 03:08 AM        WBC Lab Results   Component Value Date/Time    WBC 15.4 (H) 10/17/2019 03:08 AM        H/H Lab Results   Component Value Date/Time    HGB 9.5 (L) 10/17/2019 03:08 AM        Platelets Lab Results   Component Value Date/Time    PLATELET 128 06/89/3910 03:08 AM        Temp 99.1 °F (37.3 °C)     Start therapy with loading dose:  Vancomycin 1500 mg IV once, administered 10/17/19 at 12:05. Follow with maintenance dose of Vancomycin 1250 mg IV every 24 hours, scheduled for 10/18/19 at 12:00    Dose calculated to approximate a therapeutic trough of 15 - 20 mcg/mL. Pharmacy to follow daily and will make changes to dose and/or frequency based on clinical status.   Pharmacist Trevor Dunn

## 2019-10-17 NOTE — ED NOTES
TRANSFER - OUT REPORT:    Verbal report given to [facility], RN (name) on Hermes Hein  being transferred to ICU(unit) for urgent transfer       Report consisted of patients Situation, Background, Assessment and   Recommendations(SBAR). Information from the following report(s) SBAR and ED Summary was reviewed with the receiving nurse. Lines:   Peripheral IV 10/16/19 Right Forearm (Active)   Site Assessment Clean, dry, & intact 10/16/2019  9:46 PM   Phlebitis Assessment 0 10/16/2019  9:46 PM   Infiltration Assessment 0 10/16/2019  9:46 PM   Dressing Status Clean, dry, & intact 10/16/2019  9:46 PM   Dressing Type Transparent 10/16/2019  9:46 PM   Hub Color/Line Status Green 10/16/2019  9:46 PM   Alcohol Cap Used Yes 10/16/2019  9:46 PM       Peripheral IV 10/16/19 Right Wrist (Active)   Site Assessment Clean, dry, & intact 10/16/2019  9:25 PM   Phlebitis Assessment 0 10/16/2019  9:25 PM   Infiltration Assessment 0 10/16/2019  9:25 PM   Dressing Status Clean, dry, & intact 10/16/2019  9:25 PM   Dressing Type Tape;Transparent 10/16/2019  9:25 PM   Hub Color/Line Status Green;Flushed 10/16/2019  9:25 PM   Action Taken Blood drawn 10/16/2019  9:25 PM        Opportunity for questions and clarification was provided.       Patient transported with:   Monitor  Registered Nurse  Tech

## 2019-10-17 NOTE — ED TRIAGE NOTES
Arrives via ems with c/o respirartory distress from [employer]. Nursing home adv emt that the pt spo2 was decreased and continued to decrease, however, did not make it clear as to how low it has gone.   Ems adv that 30-40 min ago the pt was found unresponsive and having difficulty breathing.,

## 2019-10-17 NOTE — H&P
History & Physical    Patient: Olivia Arevalo MRN: [id number]  CSN: [id number]    YOB: 1935  Age: 81 y.o. Sex: male      DOA: 10/16/2019  Primary Care Provider:  Joann Poole MD      Assessment/Plan     Patient Active Problem List   Diagnosis Code    Acute exacerbation of CHF (congestive heart failure) ([facility]) I50.9    CKD (chronic kidney disease) stage 3, GFR 30-59 ml/min ([facility]) N18.3    Dementia ([facility]) F03.90    History of CVA (cerebrovascular accident) Z86.73    Insulin dependent diabetes mellitus with complications ([facility]) P89.3, Z79.4    UTI (urinary tract infection) N39.0    Acute respiratory failure ([facility]) J96.00    Acute on chronic combined systolic and diastolic CHF (congestive heart failure) ([facility]) I50.43    NSTEMI (non-ST elevated myocardial infarction) ([facility] I21.4       80 y/o male with dementia who is at [facility], CKD, HTN, and CVA admitted for a CHF exacerbation and respiratory failure requiring BiPAP.      Neuro-L sided weakness due to prior CVA that is baseline. He is only A&Ox1 due to dementia. Pulm-pulmonary edema due to CHF exacerbation on BiPAP. Will attempt to wean overnight. Will also treat with IV lasix. Will cover for possible aspiration w/ zosyn. His troponin is mildly elevated which may be in part responsible for this exacerbation. Will trend troponins and if rising, will give therapeutic lovenox. CV-likely new onset CHF, will get echo and request cardiology consultation. Will also trend troponins. Nitropaste in place, will give hydralazine prn SBP>160  GI-NPO for now while on BiPAP. Renal-CKD, improved as compared to baseline. Will monitor closely given diuresis. Heme-mild anemia, better than baseline  ID-possible aspiration and UTI, will cover both w/ zosyn. F/u bld and ur cx.   Endo-SSI  Derm-no issues  F/E/N-no IVF, replete prn/NPO until off BiPAP     Prophy-SCDs, protonix, eliquis (held tonight as he is NPO and will give heparin SQ)     Full code     Estimated length of stay : 2 nights     MD Danisha Veraurnist     Critical Care Time:   1164-2513  I have spent 30 minutes of critical care time involved in lab review, consultations with specialist, family decision-making, and documentation. Lamar Brunner this entire length of time I was immediately available to the patient.     Critical Care:  The reason for providing this level of medical care for this critically ill patient was due a critical illness that impaired one or more vital organ systems such that there was a high probability of imminent or life threatening deterioration in the patients condition. This care involved high complexity decision making to assess, manipulate, and support vital system functions, to treat this degree vital organ system failure and to prevent further life threatening deterioration of the patients condition.     -----------------------------------------------------------------------------------------------------------------------------------------------------------------------------  CC: respiratory distress       HPI:     Abad Rocha is a 81 y.o. male who who was brought over from [facility] due to apparent respiratory distress and likely had aggressive sternal rubbing since he has a friction burn on his chest. Pt is a poor historian due to dementia (only A&Ox1) and no family is at the bedside. Past Medical History:   Diagnosis Date    CAD (coronary artery disease)     Chronic kidney disease     Dementia ([facility]     Diabetes ([facility] [facility]     Hypertension     [facility]        History reviewed. No pertinent surgical history. History reviewed. No pertinent family history.     Social History     Socioeconomic History    Marital status:      Spouse name: Not on file    Number of children: Not on file    Years of education: Not on file    Highest education level: Not on file   Tobacco Use    Smoking status: Unknown If Ever Smoked   Substance and Sexual Activity    Alcohol use: Not Currently       Prior to Admission medications    Medication Sig Start Date End Date Taking? Authorizing Provider   brimonidine (ALPHAGAN) 0.2 % ophthalmic solution Administer 1 Drop to both eyes two (2) times a day. 8/10/19   Grant Norman MD   isosorbide mononitrate ER (IMDUR) 60 mg CR tablet Take 1 Tab by mouth daily. 8/11/19   Grant Norman MD   amoxicillin-clavulanate (AUGMENTIN) 500-125 mg per tablet Take 1 Tab by mouth daily. 8/10/19   Grant Norman MD   furosemide (LASIX) 20 mg tablet Take 1 Tab by mouth every other day. 8/10/19   Grant Norman MD   acetaminophen (TYLENOL) 650 mg TbER Take 650 mg by mouth every eight (8) hours. Michael Gama MD   albuterol (PROVENTIL VENTOLIN) 2.5 mg /3 mL (0.083 %) nebu by Nebulization route. Michael Gama MD   alfuzosin SR (UROXATRAL) 10 mg SR tablet Take  by mouth daily. Michael Gama MD   amLODIPine (NORVASC) 5 mg tablet Take 5 mg by mouth daily. Michael Gama MD   atorvastatin (LIPITOR) 40 mg tablet Take 40 mg by mouth daily. Michael Gama MD   apixaban (ELIQUIS) 2.5 mg tablet Take 2.5 mg by mouth two (2) times a day. Michael Gama MD   hydrALAZINE (APRESOLINE) 50 mg tablet Take 25 mg by mouth three (3) times daily. Michael Gama MD   pregabalin (LYRICA) 75 mg capsule Take 75 mg by mouth. Michael Gama MD   insulin aspart U-100 (NOVOLOG) 100 unit/mL injection by SubCUTAneous route Before breakfast, lunch, dinner and at bedtime. Michael Gama MD   raNITIdine (ZANTAC) 150 mg tablet Take 150 mg by mouth two (2) times a day. Michael Gama MD   montelukast (SINGULAIR) 10 mg tablet Take 10 mg by mouth daily. Michael Gama MD   insulin glargine U-300 conc (TOUJEO SOLOSTAR U-300 INSULIN) 300 unit/mL (1.5 mL) inpn pen by SubCUTAneous route daily.     Michael Gama MD       Allergies   Allergen Reactions    [place] Juice Unknown (comments)    Pork Derived (Porcine) Unknown (comments)    Tomato Unknown (comments)     Pt unable to answer, dementia         Review of Systems  Unable to obtain due to patient condition      Physical Exam:     Physical Exam:  Visit Vitals  BP (!) 176/92   Pulse 87   Temp 98 °F (36.7 °C)   Resp 20   Wt 102.1 kg (225 lb)   SpO2 97%   BMI 34.21 kg/m²    O2 Flow Rate (L/min): 16 l/min O2 Device: BIPAP    Temp (24hrs), Av °F (36.7 °C), Min:98 °F (36.7 °C), Max:98 °F (36.7 °C)    No intake/output data recorded. No intake/output data recorded. General:  Awake, cooperative, BiPAP in place. Head:  Normocephalic, without obvious abnormality, atraumatic. Eyes:  Conjunctivae/corneas clear, sclera anicteric. Neck: Supple, symmetrical, trachea midline. Lungs:   Good air flow bilaterally w/o wheezes/rhonchi s/p nebs/lasix. Heart:  Regular rate and rhythm, S1, S2 normal, no murmur, click, rub or gallop. Abdomen: Soft, non-tender. Bowel sounds normal. No masses,  No organomegaly. Extremities: Extremities normal, atraumatic, no cyanosis. 2+ pitting edema up to knees b/l. Capillary refill normal.   Pulses: 2+ and symmetric all extremities. Skin: Skin color pink, turgor normal. No rashes or lesions   Neurologic: L sided weakness due to prior CVA. A&Ox1 (person). Labs Reviewed: All lab results for the last 24 hours reviewed.   Recent Results (from the past 24 hour(s))   EKG, 12 LEAD, INITIAL    Collection Time: 10/16/19  9:18 PM   Result Value Ref Range    Ventricular Rate 96 BPM    Atrial Rate 96 BPM    P-R Interval 288 ms    QRS Duration 142 ms    Q-T Interval 336 ms    QTC Calculation (Bezet) 424 ms    Calculated P Axis -41 degrees    Calculated R Axis -46 degrees    Calculated T Axis 120 degrees    Diagnosis       Poor data quality, interpretation may be adversely affected  Undetermined rhythm  Left axis deviation  Left bundle branch block  Abnormal ECG  Clinical correlation needed  Confirmed by Sara Yanes MD, [facility] ([id number]) on 10/16/2019 10:05:59 PM     CBC WITH AUTOMATED DIFF    Collection Time: 10/16/19  9:25 PM   Result Value Ref Range    WBC 9.1 4.6 - 13.2 K/uL    RBC 3.51 (L) 4.70 - 5.50 M/uL    HGB 9.6 (L) 13.0 - 16.0 g/dL    HCT 31.5 (L) 36.0 - 48.0 %    MCV 89.7 74.0 - 97.0 FL    MCH 27.4 24.0 - 34.0 PG    MCHC 30.5 (L) 31.0 - 37.0 g/dL    RDW 15.1 (H) 11.6 - 14.5 %    PLATELET 538 828 - 506 K/uL    MPV 11.3 9.2 - 11.8 FL    NEUTROPHILS 74 (H) 40 - 73 %    LYMPHOCYTES 15 (L) 21 - 52 %    MONOCYTES 7 3 - 10 %    EOSINOPHILS 4 0 - 5 %    BASOPHILS 0 0 - 2 %    ABS. NEUTROPHILS 6.7 1.8 - 8.0 K/UL    ABS. LYMPHOCYTES 1.3 0.9 - 3.6 K/UL    ABS. MONOCYTES 0.6 0.05 - 1.2 K/UL    ABS. EOSINOPHILS 0.4 0.0 - 0.4 K/UL    ABS. BASOPHILS 0.0 0.0 - 0.1 K/UL    DF AUTOMATED     CARDIAC PANEL,(CK, CKMB & TROPONIN)    Collection Time: 10/16/19  9:25 PM   Result Value Ref Range    CK 75 39 - 308 U/L    CK - MB 1.4 <3.6 ng/ml    CK-MB Index 1.9 0.0 - 4.0 %    Troponin-I, QT 0.37 (H) 0.0 - 6.188 NG/ML   METABOLIC PANEL, COMPREHENSIVE    Collection Time: 10/16/19  9:25 PM   Result Value Ref Range    Sodium 142 136 - 145 mmol/L    Potassium 4.7 3.5 - 5.5 mmol/L    Chloride 106 100 - 111 mmol/L    CO2 30 21 - 32 mmol/L    Anion gap 6 3.0 - 18 mmol/L    Glucose 236 (H) 74 - 99 mg/dL    BUN 43 (H) 7.0 - 18 MG/DL    Creatinine 2.20 (H) 0.6 - 1.3 MG/DL    BUN/Creatinine ratio 20 12 - 20      GFR est AA 35 (L) >60 ml/min/1.73m2    GFR est non-AA 29 (L) >60 ml/min/1.73m2    Calcium 8.5 8.5 - 10.1 MG/DL    Bilirubin, total 0.7 0.2 - 1.0 MG/DL    ALT (SGPT) 21 16 - 61 U/L    AST (SGOT) 23 10 - 38 U/L    Alk.  phosphatase 139 (H) 45 - 117 U/L    Protein, total 8.0 6.4 - 8.2 g/dL    Albumin 3.0 (L) 3.4 - 5.0 g/dL    Globulin 5.0 (H) 2.0 - 4.0 g/dL    A-G Ratio 0.6 (L) 0.8 - 1.7     NT-PRO BNP    Collection Time: 10/16/19  9:25 PM   Result Value Ref Range    NT pro-BNP 4,623 (H) 0 - 1,800 PG/ML   POC LACTIC ACID    Collection Time: 10/16/19  9:26 PM   Result Value Ref Range    Lactic Acid (POC) 2.22 (HH) 0.40 - 2.00 mmol/L   POC G3 Collection Time: 10/16/19  9:47 PM   Result Value Ref Range    Device: BIPAP      FIO2 (POC) 100 %    pH (POC) 7.348 (L) 7.35 - 7.45      pCO2 (POC) 54.0 (H) 35.0 - 45.0 MMHG    pO2 (POC) 156 (H) 80 - 100 MMHG    HCO3 (POC) 29.8 (H) 22 - 26 MMOL/L    sO2 (POC) 99 (H) 92 - 97 %    Base excess (POC) 4 mmol/L    PEEP/CPAP (POC) 5 cmH2O    PIP (POC) 16      Allens test (POC) YES      Total resp. rate 24      Site RIGHT RADIAL      Patient temp.  98.0      Specimen type (POC) ARTERIAL      Performed by Zuleyka Wild     Spontaneous timed YES     URINALYSIS W/ RFLX MICROSCOPIC    Collection Time: 10/16/19 10:35 PM   Result Value Ref Range    Color YELLOW      Appearance CLEAR      Specific gravity 1.019 1.005 - 1.030      pH (UA) 5.0 5.0 - 8.0      Protein 300 (A) NEG mg/dL    Glucose NEGATIVE  NEG mg/dL    Ketone NEGATIVE  NEG mg/dL    Bilirubin NEGATIVE  NEG      Blood MODERATE (A) NEG      Urobilinogen 1.0 0.2 - 1.0 EU/dL    Nitrites NEGATIVE  NEG      Leukocyte Esterase NEGATIVE  NEG     URINE MICROSCOPIC ONLY    Collection Time: 10/16/19 10:35 PM   Result Value Ref Range    WBC 10 to 15 0 - 5 /hpf    RBC 30 to 40 0 - 5 /hpf    Epithelial cells 2+ 0 - 5 /lpf    Bacteria 3+ (A) NEG /hpf    Amorphous Crystals 1+ (A) NEG   GLUCOSE, POC    Collection Time: 10/17/19 12:03 AM   Result Value Ref Range    Glucose (POC) 223 (H) 70 - 110 mg/dL       Procedures/imaging: see electronic medical records for all procedures/Xrays and details which were not copied into this note but were reviewed prior to creation of Plan

## 2019-10-17 NOTE — ED NOTES
[facility] adv ems that they did not perform cpr, however, pt has large mid sternal abrasions.

## 2019-10-17 NOTE — PROGRESS NOTES
0901- Bedside and Verbal shift change report given to Uday Funk (oncoming nurse) by Kar Castro (offgoing nurse). Report included the following information SBAR, Kardex, MAR and Recent Results. 5485- RT at bedside. Patient to remain on BIPAP due to labored breathing, tachypnea, and wheezing. 2121- Will hold patient's AM medications. SLP may be able to evaluate patient later today if he becomes more alert. [address] with Dr. Fredi Abel. CT of head without contrast ordered. 1355- Patient pulling BIPAP off. RT placed on NC. O2 saturation greater than 93%. [phone]- Echo tech in room, patient became restless and grabbing at tech. Echo could not be performed. 1600- Patient placed back on bipap. 1915- Bedside and Verbal shift change report given to Kar Castro (oncoming nurse) by Dolly Alejandro Rn (offgoing nurse). Report included the following information SBAR, Kardex, MAR and Recent Results.

## 2019-10-17 NOTE — PROGRESS NOTES
Hospitalist Progress Note    Patient: Cele Vargas MRN: [id number]  [facility]: [id number]    YOB: 1935  Age: 81 y.o. Sex: male    DOA: 10/16/2019 LOS:  LOS: 1 day          Chief Complaint: Ac resp failure      Assessment/Plan      82 y/o male with dementia who is at [place], CKD, HTN, and CVA admitted for a CHF exacerbation and respiratory failure requiring BiPAP.      Neuro-prior CVA with weakness and dementia also    Pulm-pulmonary edema due to acute diastolic CHF exacerbation requiring BiPAP. IV lasix Will cover for possible aspiration w/ zosyn. Add vanco as from NH with high risk. His troponin is mildly elevated which may be in part responsible for this exacerbation. Do not think he will wean quickly today from bipap-he takes eliquis for hx of PE-should resume and if cannot take, start heprain gtt    CV-diuretics-lasix 40 mg IV BID-last echo with preserved EF, will give hydralazine IV as may not be able to take PO meds this am    GI-NPO except meds-usually on pureed diet due to hx dysphagia-IV PPI    Renal-CKD stage 3-4 improved as compared to baseline. Will monitor with daily BMP, I/O    Heme-mild anemia, better than baseline    ID-possible aspiration and UTI, will cover both w/ zosyn and vanco. F/u cultures    Endo-SSI, type 2 diabetes, with renal disease    F/E/N-no IVF, replete prn/NPO until off BiPAP     Prophy-SCDs, protonix,  Resume eliquis if able    SQ heparin one dose in interim-?allergy listed-prefer he take his eliquis if possible    Critically ill gentleman with advanced comorbidities  Critical Care Note          32 minutes of critical care time spent (3010-8453) in the direct evaluation and treatment of this high risk patient.  The reason for providing this level of medical care for this critically ill patient was due a critical illness that impaired one or more vital organ systems such that there was a high probability of imminent or life threatening deterioration in the patients condition. This care involved high complexity decision making to assess, manipulate, and support vital system functions, to treat this degreee vital organ system failure and to prevent further life threatening deterioration of the patients condition. Full code    Disposition :SNF  Patient Active Problem List   Diagnosis Code    Acute exacerbation of CHF (congestive heart failure) ([facility]) I50.9    CKD (chronic kidney disease) stage 3, GFR 30-59 ml/min ([facility]) N18.3    Dementia ([facility]) F03.90    History of CVA (cerebrovascular accident) Z86.73    Insulin dependent diabetes mellitus with complications ([facility] [address] W99.8, Z79.4    UTI (urinary tract infection) N39.0    Acute respiratory failure ([facility] [address] J96.00    Acute on chronic combined systolic and diastolic CHF (congestive heart failure) ([facility]) I50.43    [facility]STEM[facility] (non-ST elevated [facility]) [facility] I21.4       Subjective:    No new issues overnight    Review of systems:    Denies pain but dopes not answer other questions, is on bipap mask limiting interaction      Vital signs/Intake and Output:  Visit Vitals  /80   Pulse (!) 107   Temp 98 °F (36.7 °C)   Resp 25   Wt 102.1 kg (225 lb)   SpO2 98%   BMI 34.21 kg/m²     Current Shift:  No intake/output data recorded.   Last three shifts:  10/15 1901 - 10/17 0700  In: -   Out: 1350 [[id number]]    Exam:    General: elderly obese AAm, on bipap, NAD, awakens to voice  Head/Neck: NCAT, supple, No masses, No lymphadenopathy  CVS:Regular rate and rhythm, no M/R/G, S1/S2 heard, no thrill  Lungs:Clear to auscultation bilaterally, no wheezes, rhonchi, or rales  Abdomen: Soft, Nontender, No distention, Normal Bowel sounds, No hepatomegaly  Extremities: stasis 2-3 plus edema LE BL  Neuro:left sided weakness  Psych:appropriate                Labs: Results:       Chemistry Recent Labs     10/17/19  0308 10/16/19  2125   * 236*    142   K 4.7 4.7    106   CO2 33* 30   BUN 45* 43*   CREA 2.23* 2.20*   CA 8.5 8.5   AGAP 3 6   BUCR 20 20   * 139*   TP 7.8 8.0   ALB 3.0* 3.0*   GLOB 4.8* 5.0*   AGRAT 0.6* 0.6*      CBC w/Diff Recent Labs     10/17/19  0308 10/16/19  2125   WBC 15.4* 9.1   RBC 3.44* 3.51*   HGB 9.5* 9.6*   HCT 30.9* 31.5*    147   GRANS  --  74*   LYMPH  --  15*   EOS  --  4      Cardiac Enzymes Recent Labs     10/17/19  0308 10/16/19  2125   CPK 72 75   CKND1 2.5 1.9      Coagulation No results for input(s): PTP, INR, APTT, INREXT in the last 72 hours. Lipid Panel No results found for: CHOL, CHOLPOCT, CHOLX, CHLST, CHOLV, 717123, HDL, HDLP, LDL, LDLC, DLDLP, 679350, VLDLC, VLDL, TGLX, TRIGL, TRIGP, TGLPOCT, CHHD, CHHDX   BNP No results for input(s): BNPP in the last 72 hours.    Liver Enzymes Recent Labs     10/17/19  0308   TP 7.8   ALB 3.0*   *   SGOT 23      Thyroid Studies No results found for: T4, T3U, TSH, TSHEXT     Procedures/imaging: see electronic medical records for all procedures/Xrays and details which were not copied into this note but were reviewed prior to creation of Bill Mae MD

## 2019-10-17 NOTE — ED NOTES
Per EMS, [facility] staff stated the \"patient was not a code and they did not initiate CPR\" however, as pt is being worked up by [employer], a friction burn ~3cm in diameter became visible at mid level of sternum. EMS reports the redness and abrasion was not evident when they picked the patient up at [facility].

## 2019-10-17 NOTE — ED PROVIDER NOTES
EMERGENCY DEPARTMENT HISTORY AND PHYSICAL EXAM    Date: 10/16/2019  Patient Name: Berto Washington    History of Presenting Illness     Chief Complaint   Patient presents with    Respiratory Distress         History Provided By: Patient and EMS    History:   9:07 PM  Berto Washington is a 81 y.o. male with PMHx of CKD, DM, HTN, and CAD who presents via EMS to the emergency department with complaint of acute onset SOB, onset approximately 30 minutes ago. EMS reports that patient was found to be unresponsive and had difficulty breathing by [address] staff. Patient was reported to have decreasing O2 sat's, although lowest reading was not recorded. Patient was placed on NRB. CPR was not performed. Blood sugar by EMS was 296. Patient denies chest pain or any other sxs or complaints. Patient is full code. History and ROS are limited due to severe respiratory distress.     PCP: Dania Lassiter MD    Current Facility-Administered Medications   Medication Dose Route Frequency Provider Last Rate Last Dose    sodium chloride (NS) flush 5-10 mL  5-10 mL IntraVENous PRN Isael Martinez MD   10 mL at 10/16/19 2208    sodium chloride (NS) flush 5-40 mL  5-40 mL IntraVENous Q8H Anna Ricardo MD        sodium chloride (NS) flush 5-40 mL  5-40 mL IntraVENous PRN Anna Ricardo MD        [START ON 10/17/2019] pantoprazole (PROTONIX) 40 mg in sodium chloride 0.9% 10 mL injection  40 mg IntraVENous DAILY Anna Ricardo MD        bisacodyl (DULCOLAX) suppository 10 mg  10 mg Rectal QHS Anna Ricardo MD        insulin lispro (HUMALOG) injection   SubCUTAneous Q6H Anna Ricardo MD        glucose chewable tablet 16 g  16 g Oral PRN Anna Ricardo MD        glucagon (GLUCAGEN) injection 1 mg  1 mg IntraMUSCular PRN Anna Ricardo MD        dextrose 10% infusion 125-250 mL  125-250 mL IntraVENous PRN Anna Ricardo MD         Current Outpatient Medications Medication Sig Dispense Refill    brimonidine (ALPHAGAN) 0.2 % ophthalmic solution Administer 1 Drop to both eyes two (2) times a day. 1 mL 0    isosorbide mononitrate ER (IMDUR) 60 mg CR tablet Take 1 Tab by mouth daily. 1 Tab 0    amoxicillin-clavulanate (AUGMENTIN) 500-125 mg per tablet Take 1 Tab by mouth daily. 5 Tab 0    furosemide (LASIX) 20 mg tablet Take 1 Tab by mouth every other day. 2 Tab 0    acetaminophen (TYLENOL) 650 mg TbER Take 650 mg by mouth every eight (8) hours.  albuterol (PROVENTIL VENTOLIN) 2.5 mg /3 mL (0.083 %) nebu by Nebulization route.  alfuzosin SR (UROXATRAL) 10 mg SR tablet Take  by mouth daily.  amLODIPine (NORVASC) 5 mg tablet Take 5 mg by mouth daily.  atorvastatin (LIPITOR) 40 mg tablet Take 40 mg by mouth daily.  apixaban (ELIQUIS) 2.5 mg tablet Take 2.5 mg by mouth two (2) times a day.  hydrALAZINE (APRESOLINE) 50 mg tablet Take 25 mg by mouth three (3) times daily.  pregabalin (LYRICA) 75 mg capsule Take 75 mg by mouth.  insulin aspart U-100 (NOVOLOG) 100 unit/mL injection by SubCUTAneous route Before breakfast, lunch, dinner and at bedtime.  raNITIdine (ZANTAC) 150 mg tablet Take 150 mg by mouth two (2) times a day.  montelukast (SINGULAIR) 10 mg tablet Take 10 mg by mouth daily.  insulin glargine U-300 conc (TOUJEO SOLOSTAR U-300 INSULIN) 300 unit/mL (1.5 mL) inpn pen by SubCUTAneous route daily. Past History     Past Medical History:  Past Medical History:   Diagnosis Date    CAD (coronary artery disease)     Chronic kidney disease     [facility]     Diabetes ([facility]     Hypertension     Stroke [facility])        Past Surgical History:  History reviewed. No pertinent surgical history. Family History:  History reviewed. No pertinent family history.     Social History:  Social History     Tobacco Use    Smoking status: Unknown If Ever Smoked   Substance Use Topics    Alcohol use: Not Currently    Drug use: Not on file       Allergies: Allergies   Allergen Reactions    Rake Juice Unknown (comments)    Pork Derived (Porcine) Unknown (comments)    Tomato Unknown (comments)     Pt unable to answer, dementia           Review of Systems   Review of Systems   Unable to perform ROS: Severe respiratory distress       Physical Exam     Vitals:    10/16/19 2230 10/16/19 2245 10/16/19 2300 10/16/19 2315   BP: 152/71 144/76 149/71 (!) 160/107   Pulse: 81 76 69 83   Resp: 21 14 16 14   Temp:       SpO2: 100% 100% 100% 100%   Weight:         Physical Exam   Constitutional: He is oriented to person, place, and time. He appears well-developed and well-nourished. He appears lethargic. He appears distressed. Obese male arrived in severe respiratory distress. He is very lethargic. He will open his eyes from mild painful stimuli. He talks to me. He denies any pain. He reports sudden onset SOB. He attempts to follow orders . He is on NRB. Pulse ox at 94%. HENT:   Head: Normocephalic and atraumatic. Eyes: Pupils are equal, round, and reactive to light. Neck: Neck supple. Cardiovascular: Normal rate, regular rhythm, S1 normal, S2 normal and normal heart sounds. Pulmonary/Chest: He is in respiratory distress. He exhibits no tenderness. Diffuse rhonchi with wheezing to both bases from the back. Minimal bilateral subcostal retractions. Abrasion midsternum. Abdominal: Soft. He exhibits no mass. There is no tenderness. There is no guarding. Abdomen is obese   Musculoskeletal: Normal range of motion. He exhibits no tenderness. Trace edema, worse in the left leg. Neurological: He is oriented to person, place, and time. He appears lethargic. No cranial nerve deficit. Very lethargic. Cranial nerves II-XII intact. Skin: No rash noted. Psychiatric: He has a normal mood and affect. His behavior is normal. Thought content normal.   Nursing note and vitals reviewed.     Diagnostic Study Results     Labs - Recent Results (from the past 12 hour(s))   EKG, 12 LEAD, INITIAL    Collection Time: 10/16/19  9:18 PM   Result Value Ref Range    Ventricular Rate 96 BPM    Atrial Rate 96 BPM    P-R Interval 288 ms    QRS Duration 142 ms    Q-T Interval 336 ms    QTC Calculation (Bezet) 424 ms    Calculated P Axis -41 degrees    Calculated R Axis -46 degrees    Calculated T Axis 120 degrees    Diagnosis       Poor data quality, interpretation may be adversely affected  Undetermined rhythm  Left axis deviation  Left bundle branch block  Abnormal ECG  Clinical correlation needed  Confirmed by Amberly Guillermo MD, [facility] ([id number]) on 10/16/2019 10:05:59 PM     CBC WITH AUTOMATED DIFF    Collection Time: 10/16/19  9:25 PM   Result Value Ref Range    WBC 9.1 4.6 - 13.2 K/uL    RBC 3.51 (L) 4.70 - 5.50 M/uL    HGB 9.6 (L) 13.0 - 16.0 g/dL    HCT 31.5 (L) 36.0 - 48.0 %    MCV 89.7 74.0 - 97.0 FL    MCH 27.4 24.0 - 34.0 PG    MCHC 30.5 (L) 31.0 - 37.0 g/dL    RDW 15.1 (H) 11.6 - 14.5 %    PLATELET 879 041 - 287 K/uL    MPV 11.3 9.2 - 11.8 FL    NEUTROPHILS 74 (H) 40 - 73 %    LYMPHOCYTES 15 (L) 21 - 52 %    MONOCYTES 7 3 - 10 %    EOSINOPHILS 4 0 - 5 %    BASOPHILS 0 0 - 2 %    ABS. NEUTROPHILS 6.7 1.8 - 8.0 K/UL    ABS. LYMPHOCYTES 1.3 0.9 - 3.6 K/UL    ABS. MONOCYTES 0.6 0.05 - 1.2 K/UL    ABS. EOSINOPHILS 0.4 0.0 - 0.4 K/UL    ABS.  BASOPHILS 0.0 0.0 - 0.1 K/UL    DF AUTOMATED     CARDIAC PANEL,(CK, CKMB & TROPONIN)    Collection Time: 10/16/19  9:25 PM   Result Value Ref Range    CK 75 39 - 308 U/L    CK - MB 1.4 <3.6 ng/ml    CK-MB Index 1.9 0.0 - 4.0 %    Troponin-I, QT 0.37 (H) 0.0 - 0.638 NG/ML   METABOLIC PANEL, COMPREHENSIVE    Collection Time: 10/16/19  9:25 PM   Result Value Ref Range    Sodium 142 136 - 145 mmol/L    Potassium 4.7 3.5 - 5.5 mmol/L    Chloride 106 100 - 111 mmol/L    CO2 30 21 - 32 mmol/L    Anion gap 6 3.0 - 18 mmol/L    Glucose 236 (H) 74 - 99 mg/dL    BUN 43 (H) 7.0 - 18 MG/DL    Creatinine 2.20 (H) 0.6 - 1.3 MG/DL BUN/Creatinine ratio 20 12 - 20      GFR est AA 35 (L) >60 ml/min/1.73m2    GFR est non-AA 29 (L) >60 ml/min/1.73m2    Calcium 8.5 8.5 - 10.1 MG/DL    Bilirubin, total 0.7 0.2 - 1.0 MG/DL    ALT (SGPT) 21 16 - 61 U/L    AST (SGOT) 23 10 - 38 U/L    Alk. phosphatase 139 (H) 45 - 117 U/L    Protein, total 8.0 6.4 - 8.2 g/dL    Albumin 3.0 (L) 3.4 - 5.0 g/dL    Globulin 5.0 (H) 2.0 - 4.0 g/dL    A-G Ratio 0.6 (L) 0.8 - 1.7     NT-PRO BNP    Collection Time: 10/16/19  9:25 PM   Result Value Ref Range    NT pro-BNP 4,623 (H) 0 - 1,800 PG/ML   POC LACTIC ACID    Collection Time: 10/16/19  9:26 PM   Result Value Ref Range    Lactic Acid (POC) 2.22 (HH) 0.40 - 2.00 mmol/L   POC G3    Collection Time: 10/16/19  9:47 PM   Result Value Ref Range    Device: BIPAP      FIO2 (POC) 100 %    pH (POC) 7.348 (L) 7.35 - 7.45      pCO2 (POC) 54.0 (H) 35.0 - 45.0 MMHG    pO2 (POC) 156 (H) 80 - 100 MMHG    HCO3 (POC) 29.8 (H) 22 - 26 MMOL/L    sO2 (POC) 99 (H) 92 - 97 %    Base excess (POC) 4 mmol/L    PEEP/CPAP (POC) 5 cmH2O    PIP (POC) 16      Allens test (POC) YES      Total resp. rate 24      Site RIGHT RADIAL      Patient temp.  98.0      Specimen type (POC) ARTERIAL      Performed by Kelvin Centeno     Spontaneous timed YES     URINALYSIS W/ RFLX MICROSCOPIC    Collection Time: 10/16/19 10:35 PM   Result Value Ref Range    Color YELLOW      Appearance CLEAR      Specific gravity 1.019 1.005 - 1.030      pH (UA) 5.0 5.0 - 8.0      Protein 300 (A) NEG mg/dL    Glucose NEGATIVE  NEG mg/dL    Ketone NEGATIVE  NEG mg/dL    Bilirubin NEGATIVE  NEG      Blood MODERATE (A) NEG      Urobilinogen 1.0 0.2 - 1.0 EU/dL    Nitrites NEGATIVE  NEG      Leukocyte Esterase NEGATIVE  NEG     URINE MICROSCOPIC ONLY    Collection Time: 10/16/19 10:35 PM   Result Value Ref Range    WBC 10 to 15 0 - 5 /hpf    RBC 30 to 40 0 - 5 /hpf    Epithelial cells 2+ 0 - 5 /lpf    Bacteria 3+ (A) NEG /hpf    Amorphous Crystals 1+ (A) NEG       Radiologic Studies - XR CHEST PORT    (Results Pending)     10:23 PM  RADIOLOGY FINDINGS  Chest X-ray shows CHF  Pending review by Radiologist  Recorded by RUBA Rogers, as dictated by Zachary Ellis MD    Medical Decision Making   I am the first provider for this patient. I reviewed the vital signs, available nursing notes, past medical history, past surgical history, family history and social history. Vital Signs-Reviewed the patient's vital signs. Pulse Oximetry Analysis - 89% on NRB    Cardiac Monitor:  Rate: 78 bpm  Rhythm: Sinus with 1st degree block    EKG interpretation: (Preliminary)  9:18 PM  Questionable A-fib at 96 bpm. Old LBBB. EKG read by Zachary Ellis MD    Records Reviewed: Nursing Notes and Old Medical Records    Provider Notes (Medical Decision Making):   INITIAL CLINICAL IMPRESSION and PLANS:  The patient presents with the primary complaint(s) of: shortness of breath. The presentation, to include historical aspects and clinical findings are consistent with the DX of shortness of breath. However, other possible DX's to consider as primary, associated with, or exacerbated by include:    1. Reactive airway disease  2. Bronchitis  3. CHF  4. PE  5. ACS  6. Bronchospasm  7. GERD  8. Anxiety  9.  MI  10.  Aspiration    Procedures:  Procedures    MEDICATIONS GIVEN:  Medications   sodium chloride (NS) flush 5-10 mL (10 mL IntraVENous Given 10/16/19 2208)   sodium chloride (NS) flush 5-40 mL (has no administration in time range)   sodium chloride (NS) flush 5-40 mL (has no administration in time range)   pantoprazole (PROTONIX) 40 mg in sodium chloride 0.9% 10 mL injection (has no administration in time range)   bisacodyl (DULCOLAX) suppository 10 mg (has no administration in time range)   insulin lispro (HUMALOG) injection (has no administration in time range)   glucose chewable tablet 16 g (has no administration in time range)   glucagon (GLUCAGEN) injection 1 mg (has no administration in time range)   dextrose 10% infusion 125-250 mL (has no administration in time range)   albuterol-ipratropium (DUO-NEB) 2.5 MG-0.5 MG/3 ML (3 mL Nebulization Given 10/16/19 2123)   furosemide (LASIX) injection 80 mg (80 mg IntraVENous Given 10/16/19 2202)   nitroglycerin (NITROBID) 2 % ointment 1 Inch (1 Inch Topical Given 10/16/19 2156)   ondansetron (ZOFRAN) injection 4 mg (4 mg IntraVENous Given 10/16/19 2208)   morphine injection 2 mg (2 mg IntraVENous Given 10/16/19 2209)   piperacillin-tazobactam (ZOSYN) 3.375 g in 0.9% sodium chloride (MBP/ADV) 100 mL MBP (3.375 g IntraVENous New Bag 10/16/19 2157)       ED Course:   9:07 PM   Initial assessment performed. The patients presenting problems have been discussed, and they are in agreement with the care plan formulated and outlined with them. I have encouraged them to ask questions as they arise throughout their visit. 10:28 PM Discussed patient's history, exam, and available diagnostics results with Tonia Owens MD, hospitalist, who agrees with admission. Diagnosis and Disposition     Discussion:   EMS called at scene due to sudden onset respiratory distress. Patient unresponsive to painful stimuli at scene with very low pulse ox per EMS. Patient immediately put on NRB with improvement of pulse ox to 94%. BiPAP not started due to unresponsiveness. Patient lethargic but able to respond to me upon ED arrival. Pulse ox still 94%. Appeared in respiratory distress with rhonchi, wheezes, and retractions. Immediately placed on BiPAP with improvement of pulse ox. Reviewed patient's medical records. Patient with history of CHF. No history of COPD but he takes nebulizer treatments, frequency unknown. He was given Duo-Neb via the BiPAP. EKG shows a chronic LBBB, questionable A-fib, but this may be sinus with 1st degree block. CXR showed pulmonary edema. Patient also has history of dysphagia.  He met SIRS criteria upon ED arrival but with no fever or elevated WBC. Aspiration was considered. Lactic acid was ordered and Zosyn started. However, patient was already improving with initiation of BiPAP. Nitro ointment and Lasix were given for CHF. Did not diurese much prior to admission to ICU but was significantly more comfortable. He has chronic renal failure and his creatinine is at baseline. However, his troponin is chronic elevated but more so today. Patient denied any chest pain. Critical Care Time:   10:55 PM  I have spent 60 minutes of critical care time involved in lab review, consultations with specialist, family decision-making, and documentation. During this entire length of time I was immediately available to the patient. Critical Care: The reason for providing this level of medical care for this critically ill patient was due a critical illness that impaired one or more vital organ systems such that there was a high probability of imminent or life threatening deterioration in the patients condition. This care involved high complexity decision making to assess, manipulate, and support vital system functions, to treat this degree vital organ system failure and to prevent further life threatening deterioration of the patients condition. Core Measures:  For Hospitalized Patients:    1. Hospitalization Decision Time:  The decision to hospitalize the patient was made by Gerardo Rico MD at 10:15 on 10/16/2019    2. Aspirin: Aspirin was not given because the patient did not present with a stroke at the time of their Emergency Department evaluation    10:28 PM  Patient is being admitted to the hospital by Lelo Iqbal MD. The results of their tests and reasons for their admission have been discussed with them and/or available family. They convey agreement and understanding for the need to be admitted and for their admission diagnosis. CONDITIONS ON ADMISSION:  Sepsis is not present at the time of admission. Deep Vein Thrombosis is not present at the time of admission. Thrombosis is not present at the time of admission. Urinary Tract Infection is not present at the time of admission. Pneumonia is not present at the time of admission. MRSA is not present at the time of admission. Wound infection is not present at the time of admission. Pressure Ulcer is not present at the time of admission. CLINICAL IMPRESSION:    1. Acute respiratory failure with hypoxia (HCC)        PLAN:  1. Admit to ICU  _______________________________    This note was prepared by Demetrius Pena, acting as Scribe for [employer], MD    [employer], MD  The scribe's documentation has been prepared under my direction and personally reviewed by me in its entirety. I confirm that the note above accurately reflects all work, treatment, procedures, and medical decision making performed by me.

## 2019-10-17 NOTE — PROGRESS NOTES
Set up the Echo machine in room 104 after positive identification of the patient from his wristband. After applying the gel, patient repeatedly brought up his right arm to the parasternal area where I was starting my scanning. Patient refused to co-operate and appeared to be agitated and behaving in an incoherent manner    This incident was shared with the patient's attending Nurse- Cristal Montez, and the planned TransThoracic Echo scan was  NOT performed    .

## 2019-10-17 NOTE — PROGRESS NOTES
Speech Therapy Note:    SLP orders received and attempted at [phone] however, patient:      []  Lethargic, unable to be alerted enough for safe PO intake  []  Refused participation  []  Off the unit  []  NPO for procedure  [x]  Other: on BiPAP    SLP will f/u next day or as medically indicated. D/w RN, Kade Gifford.  Thank you for this referral.     MARY Barrett.S., [address] [facility], [place]  Speech Language Pathologist

## 2019-10-17 NOTE — PROGRESS NOTES
[facility] Medic Code Sepsis  -   - Time of Code Sepsis: 2130  - Team:  Physician Juan  Bedside Nurse JENNY Gretchen  Medic Timothy Robles    - SIRS # 1 HR  SIRS # 2 RR  - Possible source of infection: Pnuemonia  - Organ dysfunction related to sepsis: Lactic > 2  - Blood Cultures collected times 2 OR collected within last 24 hours:    - Lactic Acid Collection time OR within last 6 hours: 2126  - Antibiotic Start time:   - Lactic Acid Result: 2.22  - Target Fluid Bolus Amount: 2130  - Time of Fluid Bolus initiated:  None ordered due to SOB/ CHF  - Reason for no target fluid bolus: CHF/ SOB  - New PIV / or line: PIV x 2     -  Time of Fluid Bolus Completion: n/a  - Cardiopulmonary response after fluid completion: n/a  - Time of Repeat Lactic Acid:  - Repeat Lactic Acid Result:  - Repeat Vitals: (pull in from EPIC)  - Vasopressors Needed? - Debriefed with RN:     - Additional Notes:  - Transfer to higher level of care?

## 2019-10-17 NOTE — PROGRESS NOTES
Patient taken off BIPAP to assess whether or not he can tolerate being off since he has been constantly taking himself off since coming to ICU. Will continnue to monitor.   Placed on a 5L NC.  HR 99 /  RR  26 /  O2 Sat 94%

## 2019-10-17 NOTE — PROGRESS NOTES
Reason for Admission: SOB                 RRAT Score:   48               Resources/supports as identified by patient/family:   LTCF                Top Challenges facing patient (as identified by patient/family and CM): Finances/Medication cost?    Medicare and medicaid                Transportation? Medical transport               Support system or lack thereof? Living arrangements? LTCF             Self-care/ADLs/Cognition? Current Advanced Directive/Advance Care Plan:  Full code                          Plan for utilizing home health: Will return back to LTCF                 Transition of Care Plan:  Chart reviewed per ED notes  Pt arrived via EMS in respiratory distress from [facility] facility,pt unable to communicate has Bi-Pap on at this time, no family at bedside, noted from last visit and cm OZ Deneen Silvestre note 8/09/19 pt lives at [place] with his wife, cm will cont to monitor chart and reach out tomorrow to ED contact to discuss d/c planning. Care Management Interventions  PCP Verified by CM:  Yes  Palliative Care Criteria Met (RRAT>21 & CHF Dx)?: Yes  Palliative Consult Recommended?: Yes  Mode of Transport at Discharge: BLS  Transition of Care Consult (CM Consult): Long Term Care  Current Support Network: Lives with Spouse, Nursing Facility(wife also lives at [address])  Discharge Location  Discharge Placement: Skilled nursing facility

## 2019-10-17 NOTE — CONSULTS
[facility]ists  Pulmonary, Critical Care, and Sleep Medicine    Name: Hermes Hein MRN: [id number]   : 1935 Hospital: [facility] [place]   Date: 10/17/2019        Pulmonary Critical Care Note    IMPRESSION:   Patient Active Problem List   Diagnosis Code    Acute on chronic hypercapnic hypoxic respiratory failure J96.21, J96.22    Acute on chronic combined systolic and diastolic CHF (congestive heart failure) ([facility]) I50.43    NSTEMI (non-ST elevated myocardial infarction) ([facility] [address] I21.4    Pulmonary infiltrates - pulmonary edema vs pneumonia     UTI (urinary tract infection) N39.0    Leukocytosis     CKD (chronic kidney disease) stage 3, GFR 30-59 ml/min ([facility]) N18.3    Anemia     History of CVA (cerebrovascular accident) Z86.73    Insulin dependent diabetes mellitus with complications ([facility]) M09.4, Z79.4    Hypertension I10    Acute encephalopathy     Dementia ([facility] [address] F03.90 ·      RECOMMENDATIONS:   Continue BiPAP for now, adjust settings per ABG or for goal SPO2> 91%  Supplemental O2 via NC when off of BiPAP, titrate flow for goal SPO2> 91%  Aspiration prevention bundle, head of the bed at 30' all times  ABG now for follow up; CXR in AM for follow up  Sputum culture if sample available  Continue bronchodilators PRN, pulmonary hygiene care  Sepsis bundle per hospital protocol. Lactic acid normalized. Antibiotic choice: Zosyn, vancomycin  Cultures drawn and will be followed. Monitor hemodynamics. Adjust home BP meds  Check CT head, TSH, Ammonia  Diuresis as tolerated. Monitor renal functions, lytes and correct lytes as needed  Glycemic control  AM labs.  Diet NPO  Palliative care consulted  Hernandez Bundle Followed   No family available  Will defer respective systems problem management to primary and other consultant and follow patient in ICU with primary and other medical team  Further recommendations will be based on the patient's response to recommended treatment and results of the investigation ordered. Quality Care: PPI, DVT prophylaxis, HOB elevated, Infection control all reviewed and addressed. PAIN AND SEDATION: none  · Skin/Wound: multiple small skin tears   · Nutrition: NPO  · Prophylaxis: DVT [Eliquis] and GI [PPI] Prophylaxis reviewed. · Restraints: none  · PT/OT eval and treat: as needed   · Lines/Tubes: lines PIV; dos santos 10/16/19  ADVANCE DIRECTIVE: Full code. DISCUSSION: spoke with RN, RT, ICU staff  Events and notes from last 24 hours reviewed. Care plan discussed with nursing      Subjective/History:   Mr. Lawrence Reynolds has been seen and evaluated as Dr. Jacey Hernández requested now for assisting with Acute on chronic hypercapnic hypoxic respiratory failure on BiPAP. The patient can not provide additional history due to dementia, unable to comprehend. Patient is a 81 y.o. male with following PMHx presented to ER from local NH after found with progressive respiratory distress and obtundation. EMS brought patient on NRB. In ER, ABG showed mild acute on chronic hypercapnia started on BiPAP. CXR showed some pulmonary edema like changes. Pro-BNP and troponin were elevated. He was admitted for CHF exacerbation and respiratory failure requiring BiPAP. Other information is limited. Review of Systems:  Review of systems not obtained due to patient factors. [x]The patient is critically ill on      []Mechanical ventilation []Pressors   [x]BiPAP []               Latest lactic acid:   Lactic acid   Date Value Ref Range Status   10/17/2019 0.5 0.4 - 2.0 MMOL/L Final       Past Medical History:  Past Medical History:   Diagnosis Date    CAD (coronary artery disease)     Chronic kidney disease     Dementia ([facility] [address]     Diabetes ([facility] [address]     Hypertension     Stroke [facility])         Past Surgical History:  History reviewed. No pertinent surgical history. Medications:  Prior to Admission medications    Medication Sig Start Date End Date Taking?  Authorizing Provider   brimonidine [facility]) 0.2 % ophthalmic solution Administer 1 Drop to both eyes two (2) times a day. 8/10/19   Naomy Pearson MD   isosorbide mononitrate ER (IMDUR) 60 mg CR tablet Take 1 Tab by mouth daily. 8/11/19   Naomy Pearson MD   amoxicillin-clavulanate (AUGMENTIN) 500-125 mg per tablet Take 1 Tab by mouth daily. 8/10/19   Naomy Pearson MD   furosemide (LASIX) 20 mg tablet Take 1 Tab by mouth every other day. 8/10/19   Naomy Pearson MD   acetaminophen (TYLENOL) 650 mg TbER Take 650 mg by mouth every eight (8) hours. Michael Gama MD   albuterol (PROVENTIL VENTOLIN) 2.5 mg /3 mL (0.083 %) nebu by Nebulization route. Michael Gama MD   alfuzosin SR (UROXATRAL) 10 mg SR tablet Take  by mouth daily. Michael Gama MD   amLODIPine (NORVASC) 5 mg tablet Take 5 mg by mouth daily. Michael Gama MD   atorvastatin (LIPITOR) 40 mg tablet Take 40 mg by mouth daily. Michael Gama MD   apixaban (ELIQUIS) 2.5 mg tablet Take 2.5 mg by mouth two (2) times a day. Michael Gama MD   hydrALAZINE (APRESOLINE) 50 mg tablet Take 25 mg by mouth three (3) times daily. Michael Gama MD   pregabalin (LYRICA) 75 mg capsule Take 75 mg by mouth. Michael Gama MD   insulin aspart U-100 (NOVOLOG) 100 unit/mL injection by SubCUTAneous route Before breakfast, lunch, dinner and at bedtime. Michael Gama MD   raNITIdine (ZANTAC) 150 mg tablet Take 150 mg by mouth two (2) times a day. Michael Gama MD   montelukast (SINGULAIR) 10 mg tablet Take 10 mg by mouth daily. Michael Gama MD   insulin glargine U-300 conc (TOUJEO SOLOSTAR U-300 INSULIN) 300 unit/mL (1.5 mL) inpn pen by SubCUTAneous route daily.     Michael Gama MD       Current Facility-Administered Medications   Medication Dose Route Frequency    alfuzosin SR (UROXATRAL) tablet 10 mg  10 mg Oral DAILY    amLODIPine (NORVASC) tablet 5 mg  5 mg Oral DAILY    atorvastatin (LIPITOR) tablet 40 mg  40 mg Oral DAILY    isosorbide mononitrate ER (IMDUR) tablet 60 mg  60 mg Oral DAILY    apixaban (ELIQUIS) tablet 2.5 mg  2.5 mg Oral BID    piperacillin-tazobactam (ZOSYN) 3.375 g in 0.9% sodium chloride (MBP/ADV) 100 mL MBP  3.375 g IntraVENous Q8H    hydrALAZINE (APRESOLINE) 20 mg/mL injection 20 mg  20 mg IntraVENous Q6H    furosemide (LASIX) injection 40 mg  40 mg IntraVENous BID    sodium chloride (NS) flush 5-40 mL  5-40 mL IntraVENous Q8H    pantoprazole (PROTONIX) 40 mg in sodium chloride 0.9% 10 mL injection  40 mg IntraVENous DAILY    insulin lispro (HUMALOG) injection   SubCUTAneous Q6H       Allergy:  Allergies   Allergen Reactions    [place] Juice Unknown (comments)    Pork Derived (Porcine) Unknown (comments)    Tomato Unknown (comments)     Pt unable to answer, dementia          Social History:  Social History     Tobacco Use    Smoking status: Unknown If Ever Smoked   Substance Use Topics    Alcohol use: Not Currently    Drug use: Not on file        Family History:  History reviewed. No pertinent family history. Objective:   Vital Signs:    Blood pressure 154/80, pulse (!) 107, temperature 100.3 °F (37.9 °C), resp. rate 25, weight 102.1 kg (225 lb), SpO2 98 %. Body mass index is 34.21 kg/m².    O2 Device: BIPAP   O2 Flow Rate (L/min): 16 l/min   Temp (24hrs), Av.2 °F (37.3 °C), Min:98 °F (36.7 °C), Max:100.3 °F (37.9 °C)         Intake/Output:   Last shift:      10/17 0701 - 10/17 1900  In: -   Out: 150 [Urine:150]  Last 3 shifts: 10/15 1901 - 10/17 0700  In: -   Out: 6879 [Urine:1350]    Intake/Output Summary (Last 24 hours) at 10/17/2019 0936  Last data filed at 10/17/2019 0850  Gross per 24 hour   Intake    Output 1500 ml   Net -1500 ml       Physical Exam:  General: opens eyes on calling name, mumbles but does not follow commands, in no respiratory distress and acyanotic, cooperative, appears stated age, on BiPAP  HEENT: PERRL, fundi benign, BiPAP mask in place  Neck: No abnormally enlarged lymph nodes or thyroid, supple  Chest: normal  Lungs: moderate air entry, rhonchi scattered bilaterally, breathing normal , normal percussion bilaterally, no tenderness/ rash  Heart: Regular rate and rhythm, S1S2 present or without murmur or extra heart sounds  Abdomen: non distended, bowel sounds normoactive, tympanic, abdomen is soft without significant tenderness, masses, organomegaly or guarding, rigidity, rebound  Extremity: 1+, 2+, pitting and bl foot and ankle edema; negative cyanosis, clubbing  Capillary refill: normal  Neuro: responds to voice, moves RUE and RLE extremities, weak on L side, no involuntary movements, exam limitations  Skin: Skin color, texture, turgor fair. Skin dry, warm, non-diaphoretic    Data:     Recent Results (from the past 24 hour(s))   EKG, 12 LEAD, INITIAL    Collection Time: 10/16/19  9:18 PM   Result Value Ref Range    Ventricular Rate 96 BPM    Atrial Rate 96 BPM    P-R Interval 288 ms    QRS Duration 142 ms    Q-T Interval 336 ms    QTC Calculation (Bezet) 424 ms    Calculated P Axis -41 degrees    Calculated R Axis -46 degrees    Calculated T Axis 120 degrees    Diagnosis       Poor data quality, interpretation may be adversely affected  Undetermined rhythm  Left axis deviation  Left bundle branch block  Abnormal ECG  Clinical correlation needed  Confirmed by Amberly Guillermo MD, [facility] ([id number]) on 10/16/2019 10:05:59 PM     CBC WITH AUTOMATED DIFF    Collection Time: 10/16/19  9:25 PM   Result Value Ref Range    WBC 9.1 4.6 - 13.2 K/uL    RBC 3.51 (L) 4.70 - 5.50 M/uL    HGB 9.6 (L) 13.0 - 16.0 g/dL    HCT 31.5 (L) 36.0 - 48.0 %    MCV 89.7 74.0 - 97.0 FL    MCH 27.4 24.0 - 34.0 PG    MCHC 30.5 (L) 31.0 - 37.0 g/dL    RDW 15.1 (H) 11.6 - 14.5 %    PLATELET 653 723 - 780 K/uL    MPV 11.3 9.2 - 11.8 FL    NEUTROPHILS 74 (H) 40 - 73 %    LYMPHOCYTES 15 (L) 21 - 52 %    MONOCYTES 7 3 - 10 %    EOSINOPHILS 4 0 - 5 %    BASOPHILS 0 0 - 2 %    ABS. NEUTROPHILS 6.7 1.8 - 8.0 K/UL    ABS. LYMPHOCYTES 1.3 0.9 - 3.6 K/UL    ABS.  MONOCYTES 0.6 0.05 - 1.2 K/UL ABS. EOSINOPHILS 0.4 0.0 - 0.4 K/UL    ABS. BASOPHILS 0.0 0.0 - 0.1 K/UL    DF AUTOMATED     CARDIAC PANEL,(CK, CKMB & TROPONIN)    Collection Time: 10/16/19  9:25 PM   Result Value Ref Range    CK 75 39 - 308 U/L    CK - MB 1.4 <3.6 ng/ml    CK-MB Index 1.9 0.0 - 4.0 %    Troponin-I, QT 0.37 (H) 0.0 - 6.407 NG/ML   METABOLIC PANEL, COMPREHENSIVE    Collection Time: 10/16/19  9:25 PM   Result Value Ref Range    Sodium 142 136 - 145 mmol/L    Potassium 4.7 3.5 - 5.5 mmol/L    Chloride 106 100 - 111 mmol/L    CO2 30 21 - 32 mmol/L    Anion gap 6 3.0 - 18 mmol/L    Glucose 236 (H) 74 - 99 mg/dL    BUN 43 (H) 7.0 - 18 MG/DL    Creatinine 2.20 (H) 0.6 - 1.3 MG/DL    BUN/Creatinine ratio 20 12 - 20      GFR est AA 35 (L) >60 ml/min/1.73m2    GFR est non-AA 29 (L) >60 ml/min/1.73m2    Calcium 8.5 8.5 - 10.1 MG/DL    Bilirubin, total 0.7 0.2 - 1.0 MG/DL    ALT (SGPT) 21 16 - 61 U/L    AST (SGOT) 23 10 - 38 U/L    Alk.  phosphatase 139 (H) 45 - 117 U/L    Protein, total 8.0 6.4 - 8.2 g/dL    Albumin 3.0 (L) 3.4 - 5.0 g/dL    Globulin 5.0 (H) 2.0 - 4.0 g/dL    A-G Ratio 0.6 (L) 0.8 - 1.7     NT-PRO BNP    Collection Time: 10/16/19  9:25 PM   Result Value Ref Range    NT pro-BNP 4,623 (H) 0 - 1,800 PG/ML   CULTURE, BLOOD    Collection Time: 10/16/19  9:25 PM   Result Value Ref Range    Special Requests: NO SPECIAL REQUESTS      Culture result: NO GROWTH AFTER 9 HOURS     POC LACTIC ACID    Collection Time: 10/16/19  9:26 PM   Result Value Ref Range    Lactic Acid (POC) 2.22 (HH) 0.40 - 2.00 mmol/L   CULTURE, BLOOD    Collection Time: 10/16/19  9:30 PM   Result Value Ref Range    Special Requests: NO SPECIAL REQUESTS      Culture result: NO GROWTH AFTER 9 HOURS     POC G3    Collection Time: 10/16/19  9:47 PM   Result Value Ref Range    Device: BIPAP      FIO2 (POC) 100 %    pH (POC) 7.348 (L) 7.35 - 7.45      pCO2 (POC) 54.0 (H) 35.0 - 45.0 MMHG    pO2 (POC) 156 (H) 80 - 100 MMHG    HCO3 (POC) 29.8 (H) 22 - 26 MMOL/L    sO2 (POC) 99 (H) 92 - 97 %    Base excess (POC) 4 mmol/L    PEEP/CPAP (POC) 5 cmH2O    PIP (POC) 16      Allens test (POC) YES      Total resp. rate 24      Site RIGHT RADIAL      Patient temp.  98.0      Specimen type (POC) ARTERIAL      Performed by Keshav Sparks     Spontaneous timed YES     URINALYSIS W/ RFLX MICROSCOPIC    Collection Time: 10/16/19 10:35 PM   Result Value Ref Range    Color YELLOW      Appearance CLEAR      Specific gravity 1.019 1.005 - 1.030      pH (UA) 5.0 5.0 - 8.0      Protein 300 (A) NEG mg/dL    Glucose NEGATIVE  NEG mg/dL    Ketone NEGATIVE  NEG mg/dL    Bilirubin NEGATIVE  NEG      Blood MODERATE (A) NEG      Urobilinogen 1.0 0.2 - 1.0 EU/dL    Nitrites NEGATIVE  NEG      Leukocyte Esterase NEGATIVE  NEG     URINE MICROSCOPIC ONLY    Collection Time: 10/16/19 10:35 PM   Result Value Ref Range    WBC 10 to 15 0 - 5 /hpf    RBC 30 to 40 0 - 5 /hpf    Epithelial cells 2+ 0 - 5 /lpf    Bacteria 3+ (A) NEG /hpf    Amorphous Crystals 1+ (A) NEG   GLUCOSE, POC    Collection Time: 10/17/19 12:03 AM   Result Value Ref Range    Glucose (POC) 223 (H) 70 - 110 mg/dL   LACTIC ACID    Collection Time: 10/17/19  1:00 AM   Result Value Ref Range    Lactic acid 0.5 0.4 - 2.0 MMOL/L   CBC W/O DIFF    Collection Time: 10/17/19  3:08 AM   Result Value Ref Range    WBC 15.4 (H) 4.6 - 13.2 K/uL    RBC 3.44 (L) 4.70 - 5.50 M/uL    HGB 9.5 (L) 13.0 - 16.0 g/dL    HCT 30.9 (L) 36.0 - 48.0 %    MCV 89.8 74.0 - 97.0 FL    MCH 27.6 24.0 - 34.0 PG    MCHC 30.7 (L) 31.0 - 37.0 g/dL    RDW 15.0 (H) 11.6 - 14.5 %    PLATELET 167 041 - 574 K/uL    MPV 10.9 9.2 - 74.4 FL   METABOLIC PANEL, COMPREHENSIVE    Collection Time: 10/17/19  3:08 AM   Result Value Ref Range    Sodium 142 136 - 145 mmol/L    Potassium 4.7 3.5 - 5.5 mmol/L    Chloride 106 100 - 111 mmol/L    CO2 33 (H) 21 - 32 mmol/L    Anion gap 3 3.0 - 18 mmol/L    Glucose 138 (H) 74 - 99 mg/dL    BUN 45 (H) 7.0 - 18 MG/DL    Creatinine 2.23 (H) 0.6 - 1.3 MG/DL BUN/Creatinine ratio 20 12 - 20      GFR est AA 34 (L) >60 ml/min/1.73m2    GFR est non-AA 28 (L) >60 ml/min/1.73m2    Calcium 8.5 8.5 - 10.1 MG/DL    Bilirubin, total 0.7 0.2 - 1.0 MG/DL    ALT (SGPT) 20 16 - 61 U/L    AST (SGOT) 23 10 - 38 U/L    Alk. phosphatase 131 (H) 45 - 117 U/L    Protein, total 7.8 6.4 - 8.2 g/dL    Albumin 3.0 (L) 3.4 - 5.0 g/dL    Globulin 4.8 (H) 2.0 - 4.0 g/dL    A-G Ratio 0.6 (L) 0.8 - 1.7     MAGNESIUM    Collection Time: 10/17/19  3:08 AM   Result Value Ref Range    Magnesium 2.1 1.6 - 2.6 mg/dL   CARDIAC PANEL,(CK, CKMB & TROPONIN)    Collection Time: 10/17/19  3:08 AM   Result Value Ref Range    CK 72 39 - 308 U/L    CK - MB 1.8 <3.6 ng/ml    CK-MB Index 2.5 0.0 - 4.0 %    Troponin-I, QT 0.40 (H) 0.0 - 0.045 NG/ML   GLUCOSE, POC    Collection Time: 10/17/19  5:12 AM   Result Value Ref Range    Glucose (POC) 98 70 - 110 mg/dL   CARDIAC PANEL,(CK, CKMB & TROPONIN)    Collection Time: 10/17/19  8:57 AM   Result Value Ref Range    CK 70 39 - 308 U/L    CK - MB 2.1 <3.6 ng/ml    CK-MB Index 3.0 0.0 - 4.0 %    Troponin-I, QT 0.53 (H) 0.0 - 0.045 NG/ML           Recent Labs     10/16/19  2147   FIO2I 100   HCO3I 29.8*   PCO2I 54.0*   PHI 7.348*   PO2I 156*       All Micro Results     Procedure Component Value Units Date/Time    CULTURE, BLOOD [[id number]] Collected:  10/16/19 2130    Order Status:  Completed Specimen:  Blood Updated:  10/17/19 1070     Special Requests: NO SPECIAL REQUESTS        Culture result: NO GROWTH AFTER 9 HOURS       CULTURE, BLOOD [[id number]] Collected:  10/16/19 2125    Order Status:  Completed Specimen:  Blood Updated:  10/17/19 0741     Special Requests: NO SPECIAL REQUESTS        Culture result: NO GROWTH AFTER 9 HOURS             Telemetry: normal sinus rhythm    8/7/19 ECHO  · Left Ventricle: Normal cavity size and systolic function (ejection fraction normal). Mild concentric hypertrophy. The estimated ejection fraction is 51 - 55%.  Abnormal wall motion as described on the wall scoring diagram below. Unable to assess diastolic function. E/E' ratio is 7.78. Wall Scoring: The following segments are hypokinetic: basal inferior, basal inferolateral, mid inferior and apical inferior. All other segments are normal.  · Left Atrium: Mildly dilated left atrium. · Tricuspid Valve: Tricuspid valve not well visualized. No stenosis. Tricuspid regurgitation is inadequate for estimation of right ventricular systolic pressure. There is no evidence of pulmonary hypertension    Imaging:  [x]I have personally reviewed the patients chest radiographs images and report     Results from Hospital Encounter encounter on 10/16/19   XR CHEST PORT    Narrative EXAM: XR CHEST PORT    CLINICAL INDICATION/HISTORY: Respiratory distress  -Additional: None    COMPARISON: 8/7/2019    TECHNIQUE: Frontal view of the chest    _______________    FINDINGS:    HEART AND MEDIASTINUM: Stable appearing cardiac size and mediastinal contours. LUNGS AND PLEURAL SPACES: Redemonstration of bilateral airspace disease, with  asymmetric involvement of the right mid and lower lung. No pneumothorax. Small  bilateral pleural effusions. BONY THORAX AND SOFT TISSUES: No acute osseous abnormality    _______________      Impression IMPRESSION:    Bilateral airspace disease similar to prior. Differential considerations include  asymmetric pulmonary edema and/or pneumonia. Suspect small bilateral pleural  effusions. No results found for this or any previous visit. [x]See my orders for details    My assessment, plan of care, findings, medications, side effects etc were discussed with:  [x]nursing []PT/OT    [x]respiratory therapy []Dr. Celine Rinaldi [x]Patient     [x]Total critical care time exclusive of procedures 50 minutes with complex decision making performed and > 50% time spent in face to face evaluation.     Josette Grant MD

## 2019-10-18 ENCOUNTER — APPOINTMENT (OUTPATIENT)
Dept: GENERAL RADIOLOGY | Age: 84
DRG: 280 | End: 2019-10-18
Attending: INTERNAL MEDICINE
Payer: MEDICARE

## 2019-10-18 ENCOUNTER — APPOINTMENT (OUTPATIENT)
Dept: NON INVASIVE DIAGNOSTICS | Age: 84
DRG: 280 | End: 2019-10-18
Attending: FAMILY MEDICINE
Payer: MEDICARE

## 2019-10-18 ENCOUNTER — APPOINTMENT (OUTPATIENT)
Dept: VASCULAR SURGERY | Age: 84
DRG: 280 | End: 2019-10-18
Attending: INTERNAL MEDICINE
Payer: MEDICARE

## 2019-10-18 LAB
AMMONIA PLAS-SCNC: 22 UMOL/L (ref 11–32)
ANION GAP SERPL CALC-SCNC: 5 MMOL/L (ref 3–18)
BASOPHILS # BLD: 0 K/UL (ref 0–0.1)
BASOPHILS NFR BLD: 0 % (ref 0–2)
BUN SERPL-MCNC: 50 MG/DL (ref 7–18)
BUN/CREAT SERPL: 19 (ref 12–20)
CALCIUM SERPL-MCNC: 8.5 MG/DL (ref 8.5–10.1)
CHLORIDE SERPL-SCNC: 105 MMOL/L (ref 100–111)
CO2 SERPL-SCNC: 33 MMOL/L (ref 21–32)
CREAT SERPL-MCNC: 2.64 MG/DL (ref 0.6–1.3)
DIFFERENTIAL METHOD BLD: ABNORMAL
EOSINOPHIL # BLD: 0.2 K/UL (ref 0–0.4)
EOSINOPHIL NFR BLD: 2 % (ref 0–5)
ERYTHROCYTE [DISTWIDTH] IN BLOOD BY AUTOMATED COUNT: 15.4 % (ref 11.6–14.5)
GLUCOSE BLD STRIP.AUTO-MCNC: 108 MG/DL (ref 70–110)
GLUCOSE BLD STRIP.AUTO-MCNC: 162 MG/DL (ref 70–110)
GLUCOSE BLD STRIP.AUTO-MCNC: 198 MG/DL (ref 70–110)
GLUCOSE BLD STRIP.AUTO-MCNC: 219 MG/DL (ref 70–110)
GLUCOSE BLD STRIP.AUTO-MCNC: 250 MG/DL (ref 70–110)
GLUCOSE BLD STRIP.AUTO-MCNC: 63 MG/DL (ref 70–110)
GLUCOSE SERPL-MCNC: 95 MG/DL (ref 74–99)
HCT VFR BLD AUTO: 28.1 % (ref 36–48)
HGB BLD-MCNC: 8.6 G/DL (ref 13–16)
LYMPHOCYTES # BLD: 1 K/UL (ref 0.9–3.6)
LYMPHOCYTES NFR BLD: 11 % (ref 21–52)
MCH RBC QN AUTO: 27.5 PG (ref 24–34)
MCHC RBC AUTO-ENTMCNC: 30.6 G/DL (ref 31–37)
MCV RBC AUTO: 89.8 FL (ref 74–97)
MONOCYTES # BLD: 1 K/UL (ref 0.05–1.2)
MONOCYTES NFR BLD: 10 % (ref 3–10)
NEUTS SEG # BLD: 7.3 K/UL (ref 1.8–8)
NEUTS SEG NFR BLD: 77 % (ref 40–73)
PLATELET # BLD AUTO: 126 K/UL (ref 135–420)
PMV BLD AUTO: 11.8 FL (ref 9.2–11.8)
POTASSIUM SERPL-SCNC: 3.8 MMOL/L (ref 3.5–5.5)
RBC # BLD AUTO: 3.13 M/UL (ref 4.7–5.5)
SODIUM SERPL-SCNC: 143 MMOL/L (ref 136–145)
TSH SERPL DL<=0.05 MIU/L-ACNC: 0.45 UIU/ML (ref 0.36–3.74)
VANCOMYCIN SERPL-MCNC: 13.1 UG/ML (ref 5–40)
WBC # BLD AUTO: 9.5 K/UL (ref 4.6–13.2)

## 2019-10-18 PROCEDURE — 77030027138 HC INCENT SPIROMETER -A

## 2019-10-18 PROCEDURE — 74011250637 HC RX REV CODE- 250/637: Performed by: FAMILY MEDICINE

## 2019-10-18 PROCEDURE — 74011636637 HC RX REV CODE- 636/637: Performed by: FAMILY MEDICINE

## 2019-10-18 PROCEDURE — 74011000258 HC RX REV CODE- 258: Performed by: HOSPITALIST

## 2019-10-18 PROCEDURE — 93970 EXTREMITY STUDY: CPT

## 2019-10-18 PROCEDURE — 94640 AIRWAY INHALATION TREATMENT: CPT

## 2019-10-18 PROCEDURE — 5A09357 ASSISTANCE WITH RESPIRATORY VENTILATION, LESS THAN 24 CONSECUTIVE HOURS, CONTINUOUS POSITIVE AIRWAY PRESSURE: ICD-10-PCS | Performed by: FAMILY MEDICINE

## 2019-10-18 PROCEDURE — 80048 BASIC METABOLIC PNL TOTAL CA: CPT

## 2019-10-18 PROCEDURE — 94660 CPAP INITIATION&MGMT: CPT

## 2019-10-18 PROCEDURE — 77030011256 HC DRSG MEPILEX <16IN NO BORD MOLN -A

## 2019-10-18 PROCEDURE — C9113 INJ PANTOPRAZOLE SODIUM, VIA: HCPCS | Performed by: FAMILY MEDICINE

## 2019-10-18 PROCEDURE — 82140 ASSAY OF AMMONIA: CPT

## 2019-10-18 PROCEDURE — 92610 EVALUATE SWALLOWING FUNCTION: CPT

## 2019-10-18 PROCEDURE — 82962 GLUCOSE BLOOD TEST: CPT

## 2019-10-18 PROCEDURE — 65610000006 HC RM INTENSIVE CARE

## 2019-10-18 PROCEDURE — 74011250636 HC RX REV CODE- 250/636: Performed by: HOSPITALIST

## 2019-10-18 PROCEDURE — 92526 ORAL FUNCTION THERAPY: CPT

## 2019-10-18 PROCEDURE — 74011250636 HC RX REV CODE- 250/636: Performed by: FAMILY MEDICINE

## 2019-10-18 PROCEDURE — 84443 ASSAY THYROID STIM HORMONE: CPT

## 2019-10-18 PROCEDURE — 85025 COMPLETE CBC W/AUTO DIFF WBC: CPT

## 2019-10-18 PROCEDURE — 74011000250 HC RX REV CODE- 250: Performed by: FAMILY MEDICINE

## 2019-10-18 PROCEDURE — C8929 TTE W OR WO FOL WCON,DOPPLER: HCPCS

## 2019-10-18 PROCEDURE — 74011000250 HC RX REV CODE- 250: Performed by: INTERNAL MEDICINE

## 2019-10-18 PROCEDURE — 71045 X-RAY EXAM CHEST 1 VIEW: CPT

## 2019-10-18 PROCEDURE — 74011250637 HC RX REV CODE- 250/637: Performed by: INTERNAL MEDICINE

## 2019-10-18 PROCEDURE — 77010033678 HC OXYGEN DAILY

## 2019-10-18 PROCEDURE — 36415 COLL VENOUS BLD VENIPUNCTURE: CPT

## 2019-10-18 PROCEDURE — 80202 ASSAY OF VANCOMYCIN: CPT

## 2019-10-18 RX ORDER — METOPROLOL SUCCINATE 25 MG/1
25 TABLET, EXTENDED RELEASE ORAL DAILY
Status: DISCONTINUED | OUTPATIENT
Start: 2019-10-19 | End: 2019-10-23

## 2019-10-18 RX ORDER — NYSTATIN 100000 [USP'U]/ML
500000 SUSPENSION ORAL 4 TIMES DAILY
Status: DISPENSED | OUTPATIENT
Start: 2019-10-18 | End: 2019-10-25

## 2019-10-18 RX ORDER — DEXTROSE MONOHYDRATE 50 MG/ML
25 INJECTION, SOLUTION INTRAVENOUS CONTINUOUS
Status: DISCONTINUED | OUTPATIENT
Start: 2019-10-18 | End: 2019-10-21

## 2019-10-18 RX ORDER — QUETIAPINE FUMARATE 25 MG/1
25 TABLET, FILM COATED ORAL
Status: DISCONTINUED | OUTPATIENT
Start: 2019-10-18 | End: 2019-10-21

## 2019-10-18 RX ADMIN — PIPERACILLIN AND TAZOBACTAM 3.38 G: 3; .375 INJECTION, POWDER, LYOPHILIZED, FOR SOLUTION INTRAVENOUS at 00:04

## 2019-10-18 RX ADMIN — ATORVASTATIN CALCIUM 40 MG: 20 TABLET, FILM COATED ORAL at 10:36

## 2019-10-18 RX ADMIN — Medication 10 ML: at 06:14

## 2019-10-18 RX ADMIN — NYSTATIN 500000 UNITS: 100000 SUSPENSION ORAL at 18:04

## 2019-10-18 RX ADMIN — APIXABAN 2.5 MG: 2.5 TABLET, FILM COATED ORAL at 20:42

## 2019-10-18 RX ADMIN — IPRATROPIUM BROMIDE AND ALBUTEROL SULFATE 3 ML: .5; 3 SOLUTION RESPIRATORY (INHALATION) at 07:51

## 2019-10-18 RX ADMIN — NYSTATIN 500000 UNITS: 100000 SUSPENSION ORAL at 13:00

## 2019-10-18 RX ADMIN — FUROSEMIDE 40 MG: 10 INJECTION, SOLUTION INTRAMUSCULAR; INTRAVENOUS at 20:42

## 2019-10-18 RX ADMIN — PANTOPRAZOLE SODIUM 40 MG: 40 INJECTION, POWDER, FOR SOLUTION INTRAVENOUS at 10:37

## 2019-10-18 RX ADMIN — Medication 10 ML: at 14:00

## 2019-10-18 RX ADMIN — FUROSEMIDE 40 MG: 10 INJECTION, SOLUTION INTRAMUSCULAR; INTRAVENOUS at 10:37

## 2019-10-18 RX ADMIN — ISOSORBIDE MONONITRATE 60 MG: 60 TABLET, EXTENDED RELEASE ORAL at 10:36

## 2019-10-18 RX ADMIN — PERFLUTREN 1 ML: 6.52 INJECTION, SUSPENSION INTRAVENOUS at 09:53

## 2019-10-18 RX ADMIN — NYSTATIN 500000 UNITS: 100000 SUSPENSION ORAL at 23:16

## 2019-10-18 RX ADMIN — DEXTROSE MONOHYDRATE 50 ML/HR: 5 INJECTION, SOLUTION INTRAVENOUS at 00:10

## 2019-10-18 RX ADMIN — INSULIN LISPRO 6 UNITS: 100 INJECTION, SOLUTION INTRAVENOUS; SUBCUTANEOUS at 23:34

## 2019-10-18 RX ADMIN — PIPERACILLIN AND TAZOBACTAM 3.38 G: 3; .375 INJECTION, POWDER, LYOPHILIZED, FOR SOLUTION INTRAVENOUS at 18:04

## 2019-10-18 RX ADMIN — HYDRALAZINE HYDROCHLORIDE 20 MG: 20 INJECTION INTRAMUSCULAR; INTRAVENOUS at 06:14

## 2019-10-18 RX ADMIN — Medication 10 ML: at 23:17

## 2019-10-18 RX ADMIN — PIPERACILLIN AND TAZOBACTAM 3.38 G: 3; .375 INJECTION, POWDER, LYOPHILIZED, FOR SOLUTION INTRAVENOUS at 10:40

## 2019-10-18 RX ADMIN — ALFUZOSIN HYDROCHLORIDE 10 MG: 10 TABLET ORAL at 10:37

## 2019-10-18 RX ADMIN — QUETIAPINE FUMARATE 25 MG: 25 TABLET ORAL at 20:56

## 2019-10-18 RX ADMIN — Medication 10 ML: at 23:16

## 2019-10-18 RX ADMIN — INSULIN LISPRO 2 UNITS: 100 INJECTION, SOLUTION INTRAVENOUS; SUBCUTANEOUS at 13:00

## 2019-10-18 RX ADMIN — HYDRALAZINE HYDROCHLORIDE 20 MG: 20 INJECTION INTRAMUSCULAR; INTRAVENOUS at 00:04

## 2019-10-18 RX ADMIN — AMLODIPINE BESYLATE 5 MG: 5 TABLET ORAL at 10:37

## 2019-10-18 RX ADMIN — APIXABAN 2.5 MG: 2.5 TABLET, FILM COATED ORAL at 10:37

## 2019-10-18 NOTE — PROGRESS NOTES
Speech Therapy Note:    SLP orders received and attempted however, patient:      []  Lethargic, unable to be alerted enough for safe PO intake  []  Refused participation  []  Off the unit  []  NPO for procedure  [x]  Other: having vascular study at bedside     SLP will f/u later this day or as medically indicated.  Thank you for this referral.     Fe Miranda M.S., [id number] [facility]  Speech Language Pathologist

## 2019-10-18 NOTE — PROGRESS NOTES
0710- Bedside and Verbal shift change report given to Uday Funk (oncoming nurse) by Manoj Lindquist RN (offgoing nurse). Report included the following information SBAR, Kardex and MAR.     0750- RT at bedside. Bipap removed. NC applied at 3L.     0900- Rounding completed with Dr. Leigh Hashimoto. 1230- Awaiting telemetry bed assignment. 1700- Patient tolerating dinner's PO puree and nectar thickened liquids    1846- Received call that patient can transfer to room 200 [facility] with 3N. Ina RN will be receiving Mr. Belem Cho, going to room 358. Will notify Kayla Rudolph (Patient's son). Nurse on phone states that all the RNs on telemetry unit are capped. Was asked to call back and give report at shift change. 1918- Bedside and Verbal shift change report given to Sara Turcios (oncoming nurse) by Charlotte Crawford RN (offgoing nurse). Report included the following information SBAR, Kardex, MAR and Recent Results.

## 2019-10-18 NOTE — ACP (ADVANCE CARE PLANNING)
Durable Power of  includes medical decision Maker. Copy of DPOA is in EMR. Primary healthcare agent is either     Aminta Carbajal [phone]    OR     Daughter, Bethanie Rawls [phone]. Patient is Full Code with all agressive interventions including PEG tube if needed. Family wants him to return to [facility] when ready for discharge.

## 2019-10-18 NOTE — PROGRESS NOTES
Hospitalist Progress Note    Patient: Griffin Lozano MRN: [id number]  [facility]: [id number]    YOB: 1935  Age: 81 y.o. Sex: male    DOA: 10/16/2019 LOS:  LOS: 2 days          Chief Complaint:    Acute resp failure      Assessment/Plan     80 y/o male with dementia who is at [place], CKD, HTN, and CVA admitted for a CHF exacerbation and respiratory failure requiring BiPAP. As well as NSTEMI     Neuro-prior CVA with weakness and dementia also     Pulm-pulmonary edema due to acute diastolic CHF exacerbation requiring BiPAP. now improved and on NC 02- IV lasix, possible aspiration pneumonia. abx as doing. He takes eliquis for hx of PE     CV-NSTEMI with positive troponin, and acute diastolic CHF exacerbation-echo could not be done due to agitation-(CT head negative)     GI-dysphagia diet started     Renal-CKD stage 3-4 improved as compared to baseline.  Will monitor with daily BMP, I/O     Heme-anemia of CKD     ID-possible aspiration and UTI, will cover both w/ zosyn and vanco. Blood cx are thus far negative     Endo-SSI, type 2 diabetes, with renal disease     F/E/N-no IVF, replete prn/dysphagia diet     Prophy-SCDs, protonix, eliquis    Palliative care team on case    Transfer to [facility], 02 as needed, bipap QHS as needed  Disposition :  Patient Active Problem List   Diagnosis Code    Acute exacerbation of CHF (congestive heart failure) ([facility]) I50.9    CKD (chronic kidney disease) stage 3, GFR 30-59 ml/min ([facility]) N18.3    Dementia ([facility]) F03.90    History of CVA (cerebrovascular accident) Z86.73    Insulin dependent diabetes mellitus with complications ([address] Y13.7, Z79.4    UTI (urinary tract infection) N39.0    Acute respiratory failure ([address] J96.00    Acute on chronic combined systolic and diastolic CHF (congestive heart failure) ([facility]) I50.43    NSTEMI (non-ST elevated myocardial infarction) ([address] I21.4       Subjective:    Off bipap this am  Denies SOB, chest pain, nausea, HA  He is pleasantly confused  Knows he is in hospital and daughters name at bedside    Review of systems:    Constitutional: denies fevers, chills  Respiratory: denies SOB, cough  Cardiovascular: denies chest pain, palpitations  Gastrointestinal: denies nausea, vomiting, diarrhea      Vital signs/Intake and Output:  Visit Vitals  /62   Pulse 80   Temp 99 °F (37.2 °C)   Resp 24   Wt 102.1 kg (225 lb)   SpO2 100%   BMI 34.21 kg/m²     Current Shift:  10/18 0701 - 10/18 1900  In: 354.2 [I.V.:354.2]  Out: -   Last three shifts:  10/16 1901 - 10/18 0700  In: -   Out: 6921 [Urine:3550]    Exam:    General: elderly obese BM, NAD, ox1  Head/Neck: NCAT, supple, No masses, No lymphadenopathy  CVS:Regular rate and rhythm, no M/R/G, S1/S2 heard, no thrill  Lungs:Clear to auscultation bilaterally, no wheezes, rhonchi, or rales  Abdomen: Soft, Nontender, No distention, Normal Bowel sounds, No hepatomegaly  Extremities: stasis edema LE BL, chronic  Neuro:grossly normal , follows commands  Psych:appropriate                Labs: Results:       Chemistry Recent Labs     10/18/19  0410 10/17/19  0308 10/16/19  2125   GLU 95 138* 236*    142 142   K 3.8 4.7 4.7    106 106   CO2 33* 33* 30   BUN 50* 45* 43*   CREA 2.64* 2.23* 2.20*   CA 8.5 8.5 8.5   AGAP 5 3 6   BUCR 19 20 20   AP  --  131* 139*   TP  --  7.8 8.0   ALB  --  3.0* 3.0*   GLOB  --  4.8* 5.0*   AGRAT  --  0.6* 0.6*      CBC w/Diff Recent Labs     10/18/19  0410 10/17/19  0308 10/16/19  2125   WBC 9.5 15.4* 9.1   RBC 3.13* 3.44* 3.51*   HGB 8.6* 9.5* 9.6*   HCT 28.1* 30.9* 31.5*   * 143 147   GRANS 77*  --  74*   LYMPH 11*  --  15*   EOS 2  --  4      Cardiac Enzymes Recent Labs     10/17/19  1512 10/17/19  0857   CPK 98 70   CKND1 5.0* 3.0      Coagulation No results for input(s): PTP, INR, APTT, INREXT in the last 72 hours.     Lipid Panel No results found for: CHOL, CHOLPOCT, CHOLX, CHLST, CHOLV, 442071, HDL, HDLP, LDL, LDLC, DLDLP, 673052, VLDLC, VLDL, TGLX, TRIGL, TRIGP, TGLPOCT, CHHD, CHHDX   BNP No results for input(s): BNPP in the last 72 hours.    Liver Enzymes Recent Labs     10/17/19  0308   TP 7.8   ALB 3.0*   *   SGOT 23      Thyroid Studies No results found for: T4, T3U, TSH, TSHEXT     Procedures/imaging: see electronic medical records for all procedures/Xrays and details which were not copied into this note but were reviewed prior to creation of Bill Mae MD

## 2019-10-18 NOTE — PROGRESS NOTES
@3750 pt taken over drowsy in bed on BIPAP easily aroused, follows commands. Oriented to self only. Hernandez remains in situ draining clear yellow urine. SCD's on bilateral feet . Assessment done and charted in appropriate flow sheets. Nursing management continues. @4468 pt taken to CT accompanied by RT and this writer on BIPAP and cardiac monitor. Griceldastephany Campbell @3295 Pt returned to ICU care continues. @2315 pt reassessed no changes observation continues. @0522 pt reassessed no new developments. Nursing care continues. @0710 Bedside and Verbal shift change report given to Dariusz Bazan (oncoming nurse) by Price Horton (offgoing nurse). Report included the following information SBAR, Kardex, Intake/Output, MAR, Recent Results, Med Rec Status and Alarm Parameters .

## 2019-10-18 NOTE — PROGRESS NOTES
Cm spoke with patients fiorella Hernandez [phone] explained cm role as d/c planner son made aware that pt will be transferred to [facility] when bed becomes available, when discharged son would like pt to return back to [place] where he lives with his wife, foc complered no plan for weekend discharge at this time.

## 2019-10-18 NOTE — CONSULTS
[facility] Consult Note      Patient: Alicia Holland MRN: [id number]  SSN: [id number]    YOB: 1935  Age: 81 y.o. Sex: male    Date of Consultation: 10/17/2019  Referring Physician: Hermelindo Garcia MD  Reason for Consultation: Abnormal troponin    Chief complain: Shortness of breath    HPI: 81-year-old gentleman nursing home resident brought to emergency room with shortness of breath. Patient is being managed for respiratory failure. Patient is not good historian due to underlying dementia. History obtain from database. Patient is on BiPAP    Past Medical History:   Diagnosis Date    CAD (coronary artery disease)     Chronic kidney disease     [facility]     Diabetes [facility]     Hypertension     Stroke ([facility]      History reviewed. No pertinent surgical history.   Current Facility-Administered Medications   Medication Dose Route Frequency    alfuzosin SR (UROXATRAL) tablet 10 mg  10 mg Oral DAILY    amLODIPine (NORVASC) tablet 5 mg  5 mg Oral DAILY    atorvastatin (LIPITOR) tablet 40 mg  40 mg Oral DAILY    isosorbide mononitrate ER (IMDUR) tablet 60 mg  60 mg Oral DAILY    apixaban (ELIQUIS) tablet 2.5 mg  2.5 mg Oral BID    piperacillin-tazobactam (ZOSYN) 3.375 g in 0.9% sodium chloride (MBP/ADV) 100 mL MBP  3.375 g IntraVENous Q8H    hydrALAZINE (APRESOLINE) 20 mg/mL injection 20 mg  20 mg IntraVENous Q6H    furosemide (LASIX) injection 40 mg  40 mg IntraVENous BID    Vancomycin - Pharmacy to Dose  1 Each Other Rx Dosing/Monitoring    albuterol-ipratropium (DUO-NEB) 2.5 MG-0.5 MG/3 ML  3 mL Nebulization Q6H PRN    [START ON 10/18/2019] vancomycin (VANCOCIN) 1,250 mg in 0.9% sodium chloride 250 mL (VIAL-MATE)  1,250 mg IntraVENous Q24H    sodium chloride (NS) flush 5-10 mL  5-10 mL IntraVENous PRN    sodium chloride (NS) flush 5-40 mL  5-40 mL IntraVENous Q8H    sodium chloride (NS) flush 5-40 mL  5-40 mL IntraVENous PRN    pantoprazole (PROTONIX) 40 mg in sodium chloride 0.9% 10 mL injection  40 mg IntraVENous DAILY    insulin lispro (HUMALOG) injection   SubCUTAneous Q6H    glucose chewable tablet 16 g  16 g Oral PRN    glucagon (GLUCAGEN) injection 1 mg  1 mg IntraMUSCular PRN    dextrose 10% infusion 125-250 mL  125-250 mL IntraVENous PRN    hydrALAZINE (APRESOLINE) 20 mg/mL injection 10 mg  10 mg IntraVENous Q6H PRN       Allergies and Intolerances: Allergies   Allergen Reactions    [place] Juice Unknown (comments)    Pork Derived (Porcine) Unknown (comments)    Tomato Unknown (comments)     Pt unable to answer, dementia         Family History:   History reviewed. No pertinent family history. Social History:   He  has no tobacco history on file. He  reports that he drank alcohol. Review of Systems:     Gen: No fever, chills, malaise, weight loss/gain. Heent: No headache, rhinorrhea, epistaxis, ear pain, hearing loss, sinus pain, neck pain/stiffness, sore throat. Heart: Positive shortness of breath,  No chest pain, palpitations, pnd, or orthopnea. Resp: No cough, hemoptysis, wheezing and shortness of breath. GI: No nausea, vomiting, diarrhea, constipation, melena or hematochezia. : No urinary obstruction, dysuria or hematuria. Derm: No rash, new skin lesion or pruritis. Musc/skeletal: no bone or joint complains. Vasc: No edema, cyanosis or claudication. Endo: No heat/cold intolerance, no polyuria,polydipsia or polyphagia. Neuro: No unilateral weakness, numbness, tingling. No seizures. Heme: No easy bruising or bleeding. Physical:   Patient Vitals for the past 6 hrs:   Pulse Resp BP SpO2   10/17/19 1946    98 %   10/17/19 1900 (!) 113 25 146/65 99 %   10/17/19 1800 (!) 106 22 157/76 100 %   10/17/19 1758 (!) 105  152/66          Exam:   General Appearance: Comfortable, not using accessory muscles of respiration. On BiPAP  HEENT: DELONTE. HEAD: Atraumatic  NECK: No JVD, no thyroidomeglay.  CAROTIDS: No bruit  LUNGS: Clear bilaterally. HEART: S1+S2 audible, no murmur, no pericardial rub. ABD: Non-tender, BS Audible    EXT: No edema, and no cyanosis. VASCULAR EXAM: Pulses are intact. PSYCHIATRIC EXAM: Mood is appropriate. MUSCULOSKELETAL: Grossly no joint deformity.   NEUROLOGICAL: Alert, follows simple verbal commands   Review of Data:   LABS:   Lab Results   Component Value Date/Time    WBC 15.4 (H) 10/17/2019 03:08 AM    HGB 9.5 (L) 10/17/2019 03:08 AM    HCT 30.9 (L) 10/17/2019 03:08 AM    PLATELET 858 28/14/2145 03:08 AM     Lab Results   Component Value Date/Time    Sodium 142 10/17/2019 03:08 AM    Potassium 4.7 10/17/2019 03:08 AM    Chloride 106 10/17/2019 03:08 AM    CO2 33 (H) 10/17/2019 03:08 AM    Glucose 138 (H) 10/17/2019 03:08 AM    BUN 45 (H) 10/17/2019 03:08 AM    Creatinine 2.23 (H) 10/17/2019 03:08 AM     No results found for: CHOL, CHOLX, CHLST, CHOLV, HDL, HDLP, LDL, LDLC, DLDLP, TGLX, TRIGL, TRIGP  No results found for: GPT  Lab Results   Component Value Date/Time    Hemoglobin A1c 7.1 (H) 08/07/2019 04:35 AM         Cardiology Procedures:   Results for orders placed or performed during the hospital encounter of 10/16/19   EKG, 12 LEAD, INITIAL   Result Value Ref Range    Ventricular Rate 96 BPM    Atrial Rate 96 BPM    P-R Interval 288 ms    QRS Duration 142 ms    Q-T Interval 336 ms    QTC Calculation (Bezet) 424 ms    Calculated P Axis -41 degrees    Calculated R Axis -46 degrees    Calculated T Axis 120 degrees    Diagnosis       Poor data quality, interpretation may be adversely affected  Undetermined rhythm  Left axis deviation  Left bundle branch block  Abnormal ECG  Clinical correlation needed  Confirmed by Jony Garcia MD, [facility] ([id number]) on 10/16/2019 10:05:59 PM             Impression / Plan:    Patient Active Problem List   Diagnosis Code    Acute exacerbation of CHF (congestive heart failure) ([facility]) I50.9    CKD (chronic kidney disease) stage 3, GFR 30-59 ml/min ([facility]) N18.3    Dementia ([facility]) F03.90  History of CVA (cerebrovascular accident) Z80.78    Insulin dependent diabetes mellitus with complications ([facility]) T85.9, Z79.4    UTI (urinary tract infection) N39.0    Acute respiratory failure ([facility]) J96.00               81year-old gentleman being managed for respiratory failure. Currently is on BiPAP. Troponin is elevated. Serial EKG with cardiac enzymes every 6-8 hours ×3  Continue IV Lasix  Continue Amlodipine   Echocardiogram  Continue management as per hospital medicine      34 minutes of critical care time spent in the direct evaluation and treatment of this high risk patient. The reason for providing this level of medical care for this critically ill patient was due a critical illness that impaired one or more vital organ systems such that there was a high probability of imminent or life threatening deterioration in the patients condition. This care involved high complexity decision making to assess, manipulate, and support vital system functions, to treat this degree vital organ system failure and to prevent further life threatening deterioration of the patients condition.     Signed By: Kwabena Malloy MD     October 17, 2019

## 2019-10-18 NOTE — PROGRESS NOTES
Pt taken off Bipap for break- neb treatment given for wheezing 2 l/m nc sats 98%. Pt has mild breakdown on bridge of nose. RN aware.

## 2019-10-18 NOTE — CONSULTS
[facility]: [phone] ([phone])  [place] [facility]: [phone]   [facility]: [phone]    Patient Name: Danika Landaverde  YOB: 1935    Date of Initial Consult: 10/18/2019   Reason for Consult: care decisions   Requesting Provider: Dr Samuel Pinto   Primary Care Physician: Darling Palmer MD      SUMMARY:   Danika Landaverde is a 81y.o. year old with a past history of dementia, hypertension, CVA, CKD, CAD, diabetes who was admitted on 10/16/2019 from [address] with a diagnosis of acute exacerbation  Current medical issues leading to Palliative Medicine involvement include: 81year old male who is a long term care  nursing home patient with significant co morbidities. Palliative care is consulted for support and goals of care. PALLIATIVE DIAGNOSES:   1. Advanced care plan discussion   2. Acute on chronic CHF  3. Dementia   4. Debility        PLAN:   1. Advance care plan discussion / goals of care discussion Patient seen at bedside along with Ms Ning RN. He is alert and answering some questions, but not able to participate in his medical decisions. His daughter Krunal Mehta and son Sandy Mendieta are designated MPOA's. AMD on is on file. Shantelle at bedside this am, overall medical condition discussed. Chart reviewed, including Care Everywhere. He has been seen not only by our Palliative team in the past but also palliative colleagues at [facility]. Their notes reviewed family has wished for continuation of full code in the past. Goals of care revisited today with Shantelle. Benefits and burdens of resuscitation carefully reviewed including the interplay of CPR with chronic, debilitating disease. We asked Shantelle to consider medical decisions in particular resuscitation  as it relates to Mr. Dwain Stroud quality of life. Goals of care full code with full interventions. 2. CHF currently maintaining on nasal cannula.  Cards and Pulmonology on board  3. Dementia lives at nursing home, knows his daughter's voice and can name her. Requires assistance for ADL's, including feeding. Seen by ST cleared for puree with nectar thick liquids and feeding assistance. Discussed dementia progression s/s with daughter Gerhard Prader. 4. Debility requires long term care at nursing facility, assistance with ADL's including feeding, he is bed bound   5. Initial consult note routed to primary continuity provider  6. Communicated plan of care with: Palliative IDT, daughter Marianne Abraham Proxy Stated Goals: Prolong life      TREATMENT PREFERENCES:   Code Status: Full Code    Advance Care Planning:  [x] The [facility] Interdisciplinary Team has updated the ACP Navigator with [address] and Patient Capacity    Primary Decision [facility] ([address]):   Primary Decision Maker: Oswald Santiago - Son - [phone]    Primary Decision Maker: Robbi Booker - Daughter - [phone]    Medical Interventions: Full interventions     Artificially Administered Nutrition: Feeding tube long-term, if indicated     Other:  As far as possible, the palliative care team has discussed with patient / health care proxy about goals of care / treatment preferences for patient.      HISTORY:     History obtained from: chart and daughter     CHIEF COMPLAINT: respiratory distress    HPI/SUBJECTIVE:    The patient is:   [] Verbal and participatory  [x] Non-participatory due to: confusion   Please see summary     Clinical Pain Assessment (nonverbal scale for nonverbal patients): Clinical Pain Assessment  Severity: 0     Activity (Movement): Lying quietly, normal position    Duration: for how long has pt been experiencing pain (e.g., 2 days, 1 month, years)  Frequency: how often pain is an issue (e.g., several times per day, once every few days, constant)     FUNCTIONAL ASSESSMENT:     Palliative Performance Scale (PPS):  PPS: 30    ECOG  ECOG Status : Completely disabled PSYCHOSOCIAL/SPIRITUAL SCREENING:      Any spiritual / Islam concerns: unable to assess for patient  [] Yes /  [] No    Caregiver Burnout:  [] Yes /  [x] No /  [] No Caregiver Present      Anticipatory grief assessment: unable to assess for patient   [] Normal  / [] Maladaptive        REVIEW OF SYSTEMS:     Positive and pertinent negative findings in ROS are noted above in HPI. The following systems were [] reviewed / [x] unable to be reviewed as noted in HPI  Other findings are noted below. Systems: constitutional, ears/nose/mouth/throat, respiratory, gastrointestinal, genitourinary, musculoskeletal, integumentary, neurologic, psychiatric, endocrine. Positive findings noted below. Modified ESAS Completed by: provider           Pain: 0           Dyspnea: 0                    PHYSICAL EXAM:     Wt Readings from Last 3 Encounters:   10/18/19 102.1 kg (225 lb)   08/10/19 107.5 kg (237 lb 1.6 oz)     Blood pressure 133/71, pulse (!) 101, temperature 99.2 °F (37.3 °C), resp. rate 19, height 5' 8\" (1.727 m), weight 102.1 kg (225 lb), SpO2 97 %.   Pain:  Pain Scale 1: Numeric (0 - 10)  Pain Intensity 1: 0                 Last bowel movement: none recorded     Constitutional: chronically ill appearing male who was lying in bed in NAD  Respiratory: breathing not labored, currently on nasal cannula   Skin: warm, dry  Neurologic: alert, knows his daughter's voice and can verbally name her, can follow a few simple commands, oriented x 2           HISTORY:     Principal Problem:    Acute respiratory failure ([facility] (10/16/2019)    Active Problems:    CKD (chronic kidney disease) stage 3, GFR 30-59 ml/min ([facility] (8/7/2019)      UTI (urinary tract infection) (8/7/2019)      Acute on chronic combined systolic and diastolic CHF (congestive heart failure) ([address] (10/16/2019)      NSTEMI (non-ST elevated myocardial infarction) ([facility] [address] [facility] (10/17/2019)      Past Medical History:   Diagnosis Date    CAD (coronary artery disease)     Chronic kidney disease     D[facility]     Diabetes ([facility]     Hypertension     Stroke [facility])       History reviewed. No pertinent surgical history. History reviewed. No pertinent family history. History reviewed, no pertinent family history.   Social History     Tobacco Use    Smoking status: Unknown If Ever Smoked   Substance Use Topics    Alcohol use: Not Currently     Allergies   Allergen Reactions    [place] Juice Unknown (comments)    Pork Derived (Porcine) Unknown (comments)    Tomato Unknown (comments)     Pt unable to answer, dementia        Current Facility-Administered Medications   Medication Dose Route Frequency    dextrose 5% infusion  25 mL/hr IntraVENous CONTINUOUS    perflutren lipid microspheres (DEFINITY) in NS bolus IV  1 mL IntraVENous PRN    nystatin (MYCOSTATIN) 100,000 unit/mL oral suspension 500,000 Units  500,000 Units Oral QID    alfuzosin SR (UROXATRAL) tablet 10 mg  10 mg Oral DAILY    amLODIPine (NORVASC) tablet 5 mg  5 mg Oral DAILY    atorvastatin (LIPITOR) tablet 40 mg  40 mg Oral DAILY    isosorbide mononitrate ER (IMDUR) tablet 60 mg  60 mg Oral DAILY    apixaban (ELIQUIS) tablet 2.5 mg  2.5 mg Oral BID    piperacillin-tazobactam (ZOSYN) 3.375 g in 0.9% sodium chloride (MBP/ADV) 100 mL MBP  3.375 g IntraVENous Q8H    furosemide (LASIX) injection 40 mg  40 mg IntraVENous BID    albuterol-ipratropium (DUO-NEB) 2.5 MG-0.5 MG/3 ML  3 mL Nebulization Q6H PRN    sodium chloride (NS) flush 5-10 mL  5-10 mL IntraVENous PRN    sodium chloride (NS) flush 5-40 mL  5-40 mL IntraVENous Q8H    sodium chloride (NS) flush 5-40 mL  5-40 mL IntraVENous PRN    pantoprazole (PROTONIX) 40 mg in sodium chloride 0.9% 10 mL injection  40 mg IntraVENous DAILY    insulin lispro (HUMALOG) injection   SubCUTAneous Q6H    glucose chewable tablet 16 g  16 g Oral PRN    glucagon (GLUCAGEN) injection 1 mg  1 mg IntraMUSCular PRN    dextrose 10% infusion 125-250 mL  125-250 mL IntraVENous PRN    hydrALAZINE (APRESOLINE) 20 mg/mL injection 10 mg  10 mg IntraVENous Q6H PRN        LAB AND IMAGING FINDINGS:     Lab Results   Component Value Date/Time    WBC 9.5 10/18/2019 04:10 AM    HGB 8.6 (L) 10/18/2019 04:10 AM    PLATELET 336 (L) 12/13/1597 04:10 AM     Lab Results   Component Value Date/Time    Sodium 143 10/18/2019 04:10 AM    Potassium 3.8 10/18/2019 04:10 AM    Chloride 105 10/18/2019 04:10 AM    CO2 33 (H) 10/18/2019 04:10 AM    BUN 50 (H) 10/18/2019 04:10 AM    Creatinine 2.64 (H) 10/18/2019 04:10 AM    Calcium 8.5 10/18/2019 04:10 AM    Magnesium 2.1 10/17/2019 03:08 AM      Lab Results   Component Value Date/Time    AST (SGOT) 23 10/17/2019 03:08 AM    Alk. phosphatase 131 (H) 10/17/2019 03:08 AM    Protein, total 7.8 10/17/2019 03:08 AM    Albumin 3.0 (L) 10/17/2019 03:08 AM    Globulin 4.8 (H) 10/17/2019 03:08 AM     No results found for: INR, PTMR, PTP, PT1, PT2, APTT, INREXT, INREXT   No results found for: IRON, FE, TIBC, IBCT, PSAT, FERR   No results found for: PH, PCO2, PO2  No components found for: [place]   Lab Results   Component Value Date/Time    CK 98 10/17/2019 03:12 PM    CK - MB 4.9 (H) 10/17/2019 03:12 PM              Total time:70 minutes    Counseling / coordination time, spent as noted above: 50 minutes   > 50% counseling / coordination: yes with daughter Lieutenant Cummins    Prolonged service was provided for  []30 min   []75 min in face to face time in the presence of the patient, spent as noted above. Time Start:   Time End:   Note: this can only be billed with 65428 (initial) or 36056 (follow up). If multiple start / stop times, list each separately.

## 2019-10-18 NOTE — PROGRESS NOTES
Problem: Dysphagia (Adult)  Goal: *Acute Goals and Plan of Care (Insert Text)  Description  Recommendations:  Diet: Puree/NECTAR thick liquid   Meds: As tolerated  Aspiration Precautions  Oral Care TID  Other: Feeding assistance    Goals:  Patient will:  1. Tolerate PO trials with 0 s/s overt distress in 4/5 trials  2. Utilize compensatory swallow strategies/maneuvers (decrease bite/sip, size/rate, alt. liq/sol) with min cues in 4/5 trials  3. Perform oral-motor/laryngeal exercises to increase oropharyngeal swallow function with min cues  4. Complete an objective swallow study (i.e., MBSS) to assess swallow integrity, r/o aspiration, and determine of safest LRD, min A   Outcome: Progressing Towards Goal      SPEECH LANGUAGE PATHOLOGY BEDSIDE SWALLOW   EVALUATION & TREATMENT     Patient: Gillian Winn (73 y.o. male)  Date: 10/18/2019  Primary Diagnosis: CHF exacerbation ([facility]) [I50.9]  Acute respiratory failure ([facility]) [J96.00]  Acute on chronic combined systolic and diastolic CHF (congestive heart failure) ([facility]) [I50.43]  Procedure(s) (LRB):  ESOPHAGOGASTRODUODENOSCOPY (EGD) (N/A) 1 Day Post-Op   Precautions: Aspiration   PLOF: SNF    ASSESSMENT :  Based on the objective data described below, the patient presents with moderate oral dysphagia. A&Ox3, daughter at bedside. Decreasaed command following. Most recent MBSS completed October 2019 without aspiration across all consistencies. Puree/thin liquid diet at baseline. Oral-motor exam revealed pt with white coating on tongue; however, all other structures grossly intact for mastication and deglutition. Oral care provided prior to PO trials. Patient accepted SLP-fed thin liquid via straw and cup sip; demo throat clear with 3/4 trials. On 1/4 trials, patient with pocketing of bolus, SLP suctioned. Patient then stated \"I don't like water\". S/sx of aspiration resolved with NTL, patient swallow appeared timely. Puree: WNL.  Recommend initiation of puree/NECTAR thick liquid diet with strict aspiration precautions. Suction must be in place at bedside. Feeding assistance. Ensure oral clearance prior to next bolus presentation. D/w RN, Ania Khan and Dr. Catrer Zhang. TREATMENT :  Skilled therapy initiated; Educated pt and daughter on aspiration precautions and importance of compensatory swallow techniques to decrease aspiration risk (decrease rate of intake & sip/bite size, upright @HOB for all po intake and ~30 minutes after po); verbalized comprehension. SLP to follow as indicated. Patient will benefit from skilled intervention to address the above impairments. Patient's rehabilitation potential is considered to be Fair  Factors which may influence rehabilitation potential include:   ? None noted  ? Mental ability/status  ? Medical condition  ? Home/family situation and support systems  ? Safety awareness  ? Pain tolerance/management  ? Other:      PLAN :  Recommendations and Planned Interventions:  Puree/NTL  Frequency/Duration: Patient will be followed by speech-language pathology 1-2 times per day/4-7 days per week to address goals. Discharge Recommendations: Skilled Nursing Facility     SUBJECTIVE:   Patient stated I don't like water. OBJECTIVE:     Past Medical History:   Diagnosis Date    CAD (coronary artery disease)     Chronic kidney disease     Dementia ([facility] [address]     Diabetes ([facility] [address]     Hypertension     Stroke [facility])    History reviewed. No pertinent surgical history.   Home Situation:      Diet prior to admission: Puree/thin  Current Diet: Puree/NTL     Cognitive and Communication Status:  Neurologic State: Alert  Orientation Level: Oriented to person, Oriented to place, Oriented to situation, Disoriented to time  Cognition: Decreased command following, Decreased attention/concentration  Perception: Appears intact  Perseveration: No perseveration noted  Safety/Judgement: Awareness of environment, Decreased insight into deficits  Oral Assessment:  Oral Assessment  Labial: No impairment  Dentition: Natural  Oral Hygiene: Poor  Lingual: Other (comment)(White coating on tongue)  Velum: No impairment  Mandible: No impairment  P.O. Trials:  Patient Position: HOB 60  Vocal quality prior to P.O.: No impairment  Consistency Presented: Thin liquid; Nectar thick liquid;Puree  How Presented: SLP-fed/presented;Cup/sip;Spoon;Straw     Bolus Acceptance: Impaired  Bolus Formation/Control: Impaired  Type of Impairment: Delayed;Mastication; Incomplete  Propulsion: Discoordination;Delayed (# of seconds)  Oral Residue: Greater than 50% of bolus;Pocketing  Initiation of Swallow: Delayed (# of seconds)  Laryngeal Elevation: Functional  Aspiration Signs/Symptoms: Clear throat  Pharyngeal Phase Characteristics: Audible swallow  Effective Modifications: Alternate liquids/solids;Small sips and bites  Cues for Modifications: Maximal       Oral Phase Severity: Moderate  Pharyngeal Phase Severity : Mild    PAIN:  Pain level pre-treatment: 0/10   Pain level post-treatment: 0/10     After treatment:   ?            Patient left in no apparent distress sitting up in chair  ? Patient left in no apparent distress in bed  ? Call bell left within reach  ? Nursing notified  ? Family present  ? Caregiver present  ? Bed alarm activated    COMMUNICATION/EDUCATION:   ?            Aspiration precautions; swallow safety; compensatory techniques. ?            Patient/family have participated as able in goal setting and plan of care. ?            Patient/family agree to work toward stated goals and plan of care. ?            Patient understands intent and goals of therapy; neutral about participation. ? Patient unable to participate in goal setting/plan of care; educ ongoing with interdisciplinary staff  ? Posted safety precautions in patient's room.     Thank you for this referral.  FLAQUITA Wade  Time Calculation: 19 mins  Evaluation Time: 10 minutes   Treatment Time: 9 minutes

## 2019-10-19 LAB
ANION GAP SERPL CALC-SCNC: 7 MMOL/L (ref 3–18)
BACTERIA SPEC CULT: NORMAL
BASOPHILS # BLD: 0 K/UL (ref 0–0.1)
BASOPHILS NFR BLD: 0 % (ref 0–2)
BUN SERPL-MCNC: 54 MG/DL (ref 7–18)
BUN/CREAT SERPL: 19 (ref 12–20)
CALCIUM SERPL-MCNC: 8.3 MG/DL (ref 8.5–10.1)
CHLORIDE SERPL-SCNC: 104 MMOL/L (ref 100–111)
CO2 SERPL-SCNC: 32 MMOL/L (ref 21–32)
CREAT SERPL-MCNC: 2.88 MG/DL (ref 0.6–1.3)
DIFFERENTIAL METHOD BLD: ABNORMAL
ECHO AO ASC DIAM: 3.2 CM
ECHO AO ROOT DIAM: 3.98 CM
ECHO AV AREA VTI: 3.1 CM2
ECHO AV MEAN GRADIENT: 3.1 MMHG
ECHO AV PEAK GRADIENT: 5.3 MMHG
ECHO AV PEAK VELOCITY: 115 CM/S
ECHO LA AREA 2C: 15.6 CM2
ECHO LA AREA 4C: 18.6 CM2
ECHO LA MAJOR AXIS: 4.2 CM
ECHO LA VOL 2C: 42 ML (ref 18–58)
ECHO LA VOL 4C: 56 ML (ref 18–58)
ECHO LA VOL BP: 50.54 ML (ref 18–58)
ECHO LA VOL/BSA BIPLANE: 23.52 ML/M2 (ref 16–28)
ECHO LA VOLUME INDEX A2C: 19.55 ML/M2 (ref 16–28)
ECHO LA VOLUME INDEX A4C: 26.06 ML/M2 (ref 16–28)
ECHO LV E' LATERAL VELOCITY: 22 CM/S
ECHO LV E' SEPTAL VELOCITY: 12 CM/S
ECHO LV EDV A2C: 82 ML
ECHO LV EDV A4C: 108 ML
ECHO LV EDV BP: 129 ML (ref 67–155)
ECHO LV EDV INDEX A4C: 50.3 ML/M2
ECHO LV EDV INDEX BP: 60 ML/M2
ECHO LV EDV NDEX A2C: 38.2 ML/M2
ECHO LV EJECTION FRACTION A2C: 38 %
ECHO LV EJECTION FRACTION A4C: 31 %
ECHO LV EJECTION FRACTION BIPLANE: 34.5 % (ref 55–100)
ECHO LV ESV A2C: 46 ML
ECHO LV ESV A4C: 75 ML
ECHO LV ESV BP: 60 ML (ref 22–58)
ECHO LV ESV INDEX A2C: 21.4 ML/M2
ECHO LV ESV INDEX A4C: 34.9 ML/M2
ECHO LV ESV INDEX BP: 27.9 ML/M2
ECHO LV INTERNAL DIMENSION DIASTOLIC: 5.2 CM (ref 4.2–5.9)
ECHO LV INTERNAL DIMENSION SYSTOLIC: 3.8 CM
ECHO LV IVSD: 1 CM (ref 0.6–1)
ECHO LV MASS 2D: 389.6 G (ref 88–224)
ECHO LV MASS INDEX 2D: 181.3 G/M2 (ref 49–115)
ECHO LV POSTERIOR WALL DIASTOLIC: 1.1 CM (ref 0.6–1)
ECHO LVOT DIAM: 2.1 CM
ECHO LVOT PEAK GRADIENT: 1.7 MMHG
ECHO LVOT PEAK VELOCITY: 99 CM/S
ECHO MV E DECELERATION TIME (DT): 80 MS
ECHO MV E POINT SEPTAL SEPARATION (EPSS): 1.4 CM
ECHO MV E VELOCITY: 137 CM/S
ECHO MV E/E' LATERAL: 6.23
ECHO MV E/E' RATIO (AVERAGED): 8.82
ECHO MV E/E' SEPTAL: 11.42
ECHO RA AREA 4C: 18.2 CM2
ECHO RA MINOR AXIS: 4.3 CM
ECHO RA VOLUME: 66 ML
ECHO TRICUSPID ANNULAR PEAK SYSTOLIC VELOCITY: 2.4 CM/S
EOSINOPHIL # BLD: 0.2 K/UL (ref 0–0.4)
EOSINOPHIL NFR BLD: 2 % (ref 0–5)
ERYTHROCYTE [DISTWIDTH] IN BLOOD BY AUTOMATED COUNT: 15.1 % (ref 11.6–14.5)
GLUCOSE BLD STRIP.AUTO-MCNC: 147 MG/DL (ref 70–110)
GLUCOSE BLD STRIP.AUTO-MCNC: 211 MG/DL (ref 70–110)
GLUCOSE BLD STRIP.AUTO-MCNC: 224 MG/DL (ref 70–110)
GLUCOSE BLD STRIP.AUTO-MCNC: 272 MG/DL (ref 70–110)
GLUCOSE SERPL-MCNC: 105 MG/DL (ref 74–99)
HCT VFR BLD AUTO: 26.4 % (ref 36–48)
HGB BLD-MCNC: 8.1 G/DL (ref 13–16)
LYMPHOCYTES # BLD: 1 K/UL (ref 0.9–3.6)
LYMPHOCYTES NFR BLD: 11 % (ref 21–52)
MCH RBC QN AUTO: 27 PG (ref 24–34)
MCHC RBC AUTO-ENTMCNC: 30.7 G/DL (ref 31–37)
MCV RBC AUTO: 88 FL (ref 74–97)
MONOCYTES # BLD: 1 K/UL (ref 0.05–1.2)
MONOCYTES NFR BLD: 11 % (ref 3–10)
NEUTS SEG # BLD: 7 K/UL (ref 1.8–8)
NEUTS SEG NFR BLD: 76 % (ref 40–73)
PLATELET # BLD AUTO: 124 K/UL (ref 135–420)
PMV BLD AUTO: 11.7 FL (ref 9.2–11.8)
POTASSIUM SERPL-SCNC: 3.5 MMOL/L (ref 3.5–5.5)
RBC # BLD AUTO: 3 M/UL (ref 4.7–5.5)
SERVICE CMNT-IMP: NORMAL
SODIUM SERPL-SCNC: 143 MMOL/L (ref 136–145)
WBC # BLD AUTO: 9.2 K/UL (ref 4.6–13.2)

## 2019-10-19 PROCEDURE — 94660 CPAP INITIATION&MGMT: CPT

## 2019-10-19 PROCEDURE — 74011250637 HC RX REV CODE- 250/637: Performed by: INTERNAL MEDICINE

## 2019-10-19 PROCEDURE — 80048 BASIC METABOLIC PNL TOTAL CA: CPT

## 2019-10-19 PROCEDURE — 97162 PT EVAL MOD COMPLEX 30 MIN: CPT

## 2019-10-19 PROCEDURE — 85025 COMPLETE CBC W/AUTO DIFF WBC: CPT

## 2019-10-19 PROCEDURE — 77010033678 HC OXYGEN DAILY

## 2019-10-19 PROCEDURE — 65660000000 HC RM CCU STEPDOWN

## 2019-10-19 PROCEDURE — 82962 GLUCOSE BLOOD TEST: CPT

## 2019-10-19 PROCEDURE — 77030011256 HC DRSG MEPILEX <16IN NO BORD MOLN -A

## 2019-10-19 PROCEDURE — 74011636637 HC RX REV CODE- 636/637: Performed by: FAMILY MEDICINE

## 2019-10-19 PROCEDURE — 74011000258 HC RX REV CODE- 258: Performed by: HOSPITALIST

## 2019-10-19 PROCEDURE — 97530 THERAPEUTIC ACTIVITIES: CPT

## 2019-10-19 PROCEDURE — 74011250636 HC RX REV CODE- 250/636: Performed by: HOSPITALIST

## 2019-10-19 PROCEDURE — 74011250636 HC RX REV CODE- 250/636: Performed by: FAMILY MEDICINE

## 2019-10-19 PROCEDURE — 74011250637 HC RX REV CODE- 250/637: Performed by: FAMILY MEDICINE

## 2019-10-19 PROCEDURE — 74011000250 HC RX REV CODE- 250: Performed by: FAMILY MEDICINE

## 2019-10-19 PROCEDURE — C9113 INJ PANTOPRAZOLE SODIUM, VIA: HCPCS | Performed by: FAMILY MEDICINE

## 2019-10-19 PROCEDURE — 74011636637 HC RX REV CODE- 636/637: Performed by: INTERNAL MEDICINE

## 2019-10-19 PROCEDURE — 36415 COLL VENOUS BLD VENIPUNCTURE: CPT

## 2019-10-19 RX ORDER — FUROSEMIDE 10 MG/ML
40 INJECTION INTRAMUSCULAR; INTRAVENOUS DAILY
Status: DISCONTINUED | OUTPATIENT
Start: 2019-10-20 | End: 2019-10-21

## 2019-10-19 RX ORDER — AMOXICILLIN AND CLAVULANATE POTASSIUM 875; 125 MG/1; MG/1
1 TABLET, FILM COATED ORAL EVERY 12 HOURS
Status: DISCONTINUED | OUTPATIENT
Start: 2019-10-19 | End: 2019-10-21

## 2019-10-19 RX ORDER — INSULIN LISPRO 100 [IU]/ML
INJECTION, SOLUTION INTRAVENOUS; SUBCUTANEOUS
Status: DISCONTINUED | OUTPATIENT
Start: 2019-10-19 | End: 2019-10-21

## 2019-10-19 RX ADMIN — Medication 10 ML: at 14:00

## 2019-10-19 RX ADMIN — AMOXICILLIN AND CLAVULANATE POTASSIUM 1 TABLET: 875; 125 TABLET, FILM COATED ORAL at 23:31

## 2019-10-19 RX ADMIN — APIXABAN 2.5 MG: 2.5 TABLET, FILM COATED ORAL at 23:31

## 2019-10-19 RX ADMIN — FUROSEMIDE 40 MG: 10 INJECTION, SOLUTION INTRAMUSCULAR; INTRAVENOUS at 09:45

## 2019-10-19 RX ADMIN — Medication 10 ML: at 06:16

## 2019-10-19 RX ADMIN — Medication 10 ML: at 06:15

## 2019-10-19 RX ADMIN — NYSTATIN 500000 UNITS: 100000 SUSPENSION ORAL at 19:10

## 2019-10-19 RX ADMIN — PANTOPRAZOLE SODIUM 40 MG: 40 INJECTION, POWDER, FOR SOLUTION INTRAVENOUS at 09:45

## 2019-10-19 RX ADMIN — INSULIN LISPRO 4 UNITS: 100 INJECTION, SOLUTION INTRAVENOUS; SUBCUTANEOUS at 23:31

## 2019-10-19 RX ADMIN — INSULIN LISPRO 4 UNITS: 100 INJECTION, SOLUTION INTRAVENOUS; SUBCUTANEOUS at 15:06

## 2019-10-19 RX ADMIN — ISOSORBIDE MONONITRATE 60 MG: 60 TABLET, EXTENDED RELEASE ORAL at 09:45

## 2019-10-19 RX ADMIN — QUETIAPINE FUMARATE 25 MG: 25 TABLET ORAL at 23:31

## 2019-10-19 RX ADMIN — ALFUZOSIN HYDROCHLORIDE 10 MG: 10 TABLET ORAL at 09:45

## 2019-10-19 RX ADMIN — NYSTATIN 500000 UNITS: 100000 SUSPENSION ORAL at 09:46

## 2019-10-19 RX ADMIN — APIXABAN 2.5 MG: 2.5 TABLET, FILM COATED ORAL at 09:45

## 2019-10-19 RX ADMIN — AMOXICILLIN AND CLAVULANATE POTASSIUM 1 TABLET: 875; 125 TABLET, FILM COATED ORAL at 12:35

## 2019-10-19 RX ADMIN — ATORVASTATIN CALCIUM 40 MG: 20 TABLET, FILM COATED ORAL at 09:45

## 2019-10-19 RX ADMIN — PIPERACILLIN AND TAZOBACTAM 3.38 G: 3; .375 INJECTION, POWDER, LYOPHILIZED, FOR SOLUTION INTRAVENOUS at 00:41

## 2019-10-19 RX ADMIN — Medication 10 ML: at 23:31

## 2019-10-19 RX ADMIN — METOPROLOL SUCCINATE 25 MG: 25 TABLET, EXTENDED RELEASE ORAL at 09:45

## 2019-10-19 RX ADMIN — NYSTATIN 500000 UNITS: 100000 SUSPENSION ORAL at 23:31

## 2019-10-19 NOTE — PROGRESS NOTES
Cardiology Progress Note        Patient: Vero George        Sex: male          DOA: 10/16/2019  YOB: 1935      Age:  81 y.o.        LOS:  LOS: 3 days    Patient seen and examined, chart reviewed. Assessment/Plan     Patient Active Problem List   Diagnosis Code    Acute exacerbation of CHF (congestive heart failure) ([facility]) I50.9    CKD (chronic kidney disease) stage 3, GFR 30-59 ml/min ([facility]) N18.3    Dementia ([facility]) F03.90    History of CVA (cerebrovascular accident) Z86.73    Insulin dependent diabetes mellitus with complications ([facility] [address] A59.5, Z79.4    UTI (urinary tract infection) N39.0    Acute respiratory failure ([facility] [address] J96.00    Acute on chronic combined systolic and diastolic CHF (congestive heart failure) ([facility]) I50.43    NSTEMI (non-ST elevated myocardial infarction) ([facility] [address] I21.4      LVEF 36-40%    Plan:    Continue Metoprolol succinate 25 mg once a day. Continue Nitrates. No aspirin due to risk of bleeding with concomitant anticoagulation therapy. Continue statin. Change Lasix to 40 mg IV once a day  Strict I/O  Monitor renal function  Continue management as per hospital medicine               Subjective:    cc:  Denies any chest pain. My breathing is better      REVIEW OF SYSTEMS:     General: No fevers or chills. Cardiovascular: No chest pain,No palpitations, No orthopnea, No PND, No leg swelling, No claudication  Pulmonary: Positive dyspnea. Gastrointestinal: No nausea, vomiting, bleeding  Neurology: No Dizziness    Objective:      Visit Vitals  /70   Pulse 96   Temp 98 °F (36.7 °C)   Resp 24   Ht 5' 8\" (1.727 m)   Wt 102.1 kg (225 lb)   SpO2 99%   BMI 34.21 kg/m²     Body mass index is 34.21 kg/m². Physical Exam:  General Appearance: Comfortable, not using accessory muscles of respiration. HEENT: DELONTE. HEAD: Atraumatic  NECK: No JVD, no thyroidomeglay. CAROTIDS: No bruit  LUNGS: Clear bilaterally.    HEART: S1+S2 audible, no murmur, no pericardial rub. ABD: Non-tender, BS Audible    EXT: No edema, and no cyanosis. VASCULAR EXAM: Pulses are intact.     CNS: Alert, follows verbal commands  Medication:  Current Facility-Administered Medications   Medication Dose Route Frequency    amoxicillin-clavulanate (AUGMENTIN) 875-125 mg per tablet 1 Tab  1 Tab Oral Q12H    [START ON 10/20/2019] furosemide (LASIX) injection 40 mg  40 mg IntraVENous DAILY    dextrose 5% infusion  25 mL/hr IntraVENous CONTINUOUS    perflutren lipid microspheres (DEFINITY) in NS bolus IV  1 mL IntraVENous PRN    nystatin (MYCOSTATIN) 100,000 unit/mL oral suspension 500,000 Units  500,000 Units Oral QID    QUEtiapine (SEROquel) tablet 25 mg  25 mg Oral QHS    metoprolol succinate (TOPROL-XL) XL tablet 25 mg  25 mg Oral DAILY    alfuzosin SR (UROXATRAL) tablet 10 mg  10 mg Oral DAILY    atorvastatin (LIPITOR) tablet 40 mg  40 mg Oral DAILY    isosorbide mononitrate ER (IMDUR) tablet 60 mg  60 mg Oral DAILY    apixaban (ELIQUIS) tablet 2.5 mg  2.5 mg Oral BID    albuterol-ipratropium (DUO-NEB) 2.5 MG-0.5 MG/3 ML  3 mL Nebulization Q6H PRN    sodium chloride (NS) flush 5-10 mL  5-10 mL IntraVENous PRN    sodium chloride (NS) flush 5-40 mL  5-40 mL IntraVENous Q8H    sodium chloride (NS) flush 5-40 mL  5-40 mL IntraVENous PRN    pantoprazole (PROTONIX) 40 mg in sodium chloride 0.9% 10 mL injection  40 mg IntraVENous DAILY    insulin lispro (HUMALOG) injection   SubCUTAneous Q6H    glucose chewable tablet 16 g  16 g Oral PRN    glucagon (GLUCAGEN) injection 1 mg  1 mg IntraMUSCular PRN    dextrose 10% infusion 125-250 mL  125-250 mL IntraVENous PRN    hydrALAZINE (APRESOLINE) 20 mg/mL injection 10 mg  10 mg IntraVENous Q6H PRN               Lab/Data Reviewed:       Recent Labs     10/19/19  0400 10/18/19  0410 10/17/19  0308   WBC 9.2 9.5 15.4*   HGB 8.1* 8.6* 9.5*   HCT 26.4* 28.1* 30.9*   * 126* 143     Recent Labs     10/19/19  0400 10/18/19  0418 10/17/19  0308    143 142   K 3.5 3.8 4.7    105 106   CO2 32 33* 33*   * 95 138*   BUN 54* 50* 45*   CREA 2.88* 2.64* 2.23*   CA 8.3* 8.5 8.5     33 minutes of critical care time spent in the direct evaluation and treatment of this high risk patient. The reason for providing this level of medical care for this critically ill patient was due a critical illness that impaired one or more vital organ systems such that there was a high probability of imminent or life threatening deterioration in the patients condition. This care involved high complexity decision making to assess, manipulate, and support vital system functions, to treat this degree vital organ system failure and to prevent further life threatening deterioration of the patients condition.     Signed By: iDon Solano MD     October 19, 2019

## 2019-10-19 NOTE — PROGRESS NOTES
[facility]  Pulmonary, Critical Care, and Sleep Medicine    Name: Vero George MRN: [id number]   : 1935 Hospital: [facility] [place]   Date: 10/19/2019        Pulmonary Critical Care Note    IMPRESSION:   Patient Active Problem List   Diagnosis Code    Acute on chronic hypercapnic hypoxic respiratory failure J96.21, J96.22    Acute on chronic combined systolic and diastolic CHF (congestive heart failure) ([facility]) I50.43    NSTEMI (non-ST elevated myocardial infarction) ([facility] I21.4    Pulmonary infiltrates - pulmonary edema vs pneumonia     UTI (urinary tract infection) N39.0    Leukocytosis     CKD (chronic kidney disease) stage 3, GFR 30-59 ml/min ([facility]) N18.3    Anemia     History of CVA (cerebrovascular accident) Z86.73    Insulin dependent diabetes mellitus with complications ([facility] C35.0, Z79.4    Hypertension I10    Acute encephalopathy, resolved, back to baseline mental status now.  D[facility] ([facility] F03.90   Hypoglycemia, due to poor PO intake. Hx of PE, on Eliquis     Full code. RECOMMENDATIONS:   Respi: respi status improved. Used bipap for only 45 min last night. Now on 2 LPM.  Change bipap to prn. D/w RT. Titrate fio2 to keep spo2 >=92%  Aspiration precautions. HOB >30. IS, OOB, PT, OT. Cardio: BP stable. Echo with newly reduced LVEF 36-40% and global hypokinesis. CHF on cxr, improved with lasix. Trop max 1.76  C/w lasix 40 bid. cxr improving. Strict I/O. C/w Imdur, toprol xl, lasix. Off norvasc. C/w Eliquis for Hx of PE and high risk for VTE with bed bound status. PVL LE negative 10/7/19. Dr. Feliciano Armijo on board. ID:  Temp max 100  WBC improved. CXR improving. Likely aspiration pneumonia vs CHF. UA abnormal.   Urine cx negative. Blood cx NGTD  Can't collect sputum cx  D/c vanco (with renal insufficiency). Change zosyn to augmentin for 5 more days to complete total 7 day course.  .   C/w nystatin swiss and spit for oral thrush, improving. Endo:   Had hypoglycemia due to poor PO intake. On D5 at 25 ml/hr. C/w SSI  Tolerating po diet, per ST recommendations    Neuro: CVA with left hemiparesis, bed bound status at NH.   CT head nil acute. Ammonia normal 22. Mental status at baseline now. Alert awake and talking    Renal: CKD, creat slightly up. Monitor renal function and electrolyte as pt is on lasix. vanco was d/c'ed 10/18  D/c zosyn 10/19    Electrolytes: replace as needed per ICU protocol. GI: no acute issues. Diet as below    Nutrition: d/w ST, started on pureed and nector (baseline diet was pureed and thin)    PT, OT, IS. OOB. Palliative care consulted  Dos Santos Bundle Followed . On dos santos for strict I/O. Remove dos santos as soon as not needed. Will defer respective systems problem management to primary and other consultant and follow patient in ICU with primary and other medical team  Further recommendations will be based on the patient's response to recommended treatment and results of the investigation ordered. Quality Care: PPI, DVT prophylaxis, HOB elevated, Infection control all reviewed and addressed. PAIN AND SEDATION: none  · Skin/Wound: multiple small skin tears   · Nutrition: NPO  · Prophylaxis: DVT [Eliquis] and GI [PPI] Prophylaxis reviewed. · Restraints: none  · PT/OT eval and treat: as needed   · Lines/Tubes: lines PIV; dos santos 10/16/19  ADVANCE DIRECTIVE: Full code. DISCUSSION: spoke with RN, RT, ICU staff  Events and notes from last 24 hours reviewed. Care plan discussed with nursing     Transfer to tele. Once transferred, PCCM will follow from pulmonary standpoint only. Call us with any questions. Subjective/History:   Mr. Iliana Long has been seen and evaluated as Dr. Raul Mabry requested now for assisting with Acute on chronic hypercapnic hypoxic respiratory failure on BiPAP. The patient can not provide additional history due to dementia, unable to comprehend.   Patient is a 81 y.o. male with following PMHx presented to ER from local NH after found with progressive respiratory distress and obtundation. EMS brought patient on NRB. In ER, ABG showed mild acute on chronic hypercapnia started on BiPAP. CXR showed some pulmonary edema like changes. Pro-BNP and troponin were elevated. He was admitted for CHF exacerbation and respiratory failure requiring BiPAP. Other information is limited. 10/19/2019   Remained in icu. Used bipap for 45 min last night, now on NC  Temp max 100  BP stable  Mental status at baseline alert awake and following commands  No cp n v dyspnea   Mild occ dry cough, no sputum production. Left leg and hand swelling improving  No other issues overnight. Review of Systems:  Review of systems not obtained due to patient factors. Latest lactic acid:   Lactic acid   Date Value Ref Range Status   10/17/2019 0.5 0.4 - 2.0 MMOL/L Final       Past Medical History:  Past Medical History:   Diagnosis Date    CAD (coronary artery disease)     Chronic kidney disease     D[facility] ([facility]     Diabetes ([facility]     Hypertension     Stroke [facility])         Past Surgical History:  History reviewed. No pertinent surgical history. Medications:  Prior to Admission medications    Medication Sig Start Date End Date Taking? Authorizing Provider   brimonidine (ALPHAGAN) 0.2 % ophthalmic solution Administer 1 Drop to both eyes two (2) times a day. 8/10/19   Rupa Gudino MD   isosorbide mononitrate ER (IMDUR) 60 mg CR tablet Take 1 Tab by mouth daily. 8/11/19   Rupa Gudino MD   amoxicillin-clavulanate (AUGMENTIN) 500-125 mg per tablet Take 1 Tab by mouth daily. 8/10/19   Rupa Gudino MD   furosemide (LASIX) 20 mg tablet Take 1 Tab by mouth every other day. 8/10/19   Rupa Gudino MD   acetaminophen (TYLENOL) 650 mg TbER Take 650 mg by mouth every eight (8) hours. Michael Gama MD   albuterol (PROVENTIL VENTOLIN) 2.5 mg /3 mL (0.083 %) nebu by Nebulization route.     Michael Gama MD alfuzosin SR (UROXATRAL) 10 mg SR tablet Take  by mouth daily. Michael Gama MD   amLODIPine (NORVASC) 5 mg tablet Take 5 mg by mouth daily. Michael Gama MD   atorvastatin (LIPITOR) 40 mg tablet Take 40 mg by mouth daily. Michael Gama MD   apixaban (ELIQUIS) 2.5 mg tablet Take 2.5 mg by mouth two (2) times a day. Michael Gama MD   hydrALAZINE (APRESOLINE) 50 mg tablet Take 25 mg by mouth three (3) times daily. Michael Gama MD   pregabalin (LYRICA) 75 mg capsule Take 75 mg by mouth. Michael Gama MD   insulin aspart U-100 (NOVOLOG) 100 unit/mL injection by SubCUTAneous route Before breakfast, lunch, dinner and at bedtime. Michael Gama MD   raNITIdine (ZANTAC) 150 mg tablet Take 150 mg by mouth two (2) times a day. Michael Gama MD   montelukast (SINGULAIR) 10 mg tablet Take 10 mg by mouth daily. Michael Gama MD   insulin glargine U-300 conc (TOUJEO SOLOSTAR U-300 INSULIN) 300 unit/mL (1.5 mL) inpn pen by SubCUTAneous route daily.     Michael Gama MD       Current Facility-Administered Medications   Medication Dose Route Frequency    dextrose 5% infusion  25 mL/hr IntraVENous CONTINUOUS    nystatin (MYCOSTATIN) 100,000 unit/mL oral suspension 500,000 Units  500,000 Units Oral QID    QUEtiapine (SEROquel) tablet 25 mg  25 mg Oral QHS    metoprolol succinate (TOPROL-XL) XL tablet 25 mg  25 mg Oral DAILY    alfuzosin SR (UROXATRAL) tablet 10 mg  10 mg Oral DAILY    atorvastatin (LIPITOR) tablet 40 mg  40 mg Oral DAILY    isosorbide mononitrate ER (IMDUR) tablet 60 mg  60 mg Oral DAILY    apixaban (ELIQUIS) tablet 2.5 mg  2.5 mg Oral BID    piperacillin-tazobactam (ZOSYN) 3.375 g in 0.9% sodium chloride (MBP/ADV) 100 mL MBP  3.375 g IntraVENous Q8H    furosemide (LASIX) injection 40 mg  40 mg IntraVENous BID    sodium chloride (NS) flush 5-40 mL  5-40 mL IntraVENous Q8H    pantoprazole (PROTONIX) 40 mg in sodium chloride 0.9% 10 mL injection  40 mg IntraVENous DAILY    insulin lispro (HUMALOG) injection   SubCUTAneous Q6H       Allergy:  Allergies   Allergen Reactions    [place] Juice Unknown (comments)    Pork Derived (Porcine) Unknown (comments)    Tomato Unknown (comments)     Pt unable to answer, dementia          Social History:  Social History     Tobacco Use    Smoking status: Unknown If Ever Smoked   Substance Use Topics    Alcohol use: Not Currently    Drug use: Not on file        Family History:  History reviewed. No pertinent family history. Objective:   Vital Signs:    Blood pressure 118/70, pulse (!) 110, temperature 98 °F (36.7 °C), resp. rate 18, height 5' 8\" (1.727 m), weight 102.1 kg (225 lb), SpO2 100 %. Body mass index is 34.21 kg/m². O2 Device: Nasal cannula   O2 Flow Rate (L/min): 2 l/min   Temp (24hrs), Av.5 °F (36.9 °C), Min:98 °F (36.7 °C), Max:99.2 °F (37.3 °C)         Intake/Output:   Last shift:      No intake/output data recorded. Last 3 shifts: 10/17 1901 - 10/19 0700  In: 1166.3 [I.V.:1166.3]  Out: 3250 [Urine:3250]    Intake/Output Summary (Last 24 hours) at 10/19/2019 0919  Last data filed at 10/19/2019 0600  Gross per 24 hour   Intake 812.08 ml   Output 2150 ml   Net -1337.92 ml       Physical Exam:  General: alert awake and following commands. NAD. HEENT: PERRL  Neck: No abnormally enlarged lymph nodes or thyroid, supple  Chest: normal  Lungs: moderate air entry, no rhonchi. No wheezing. breathing normal , normal percussion bilaterally, no tenderness/ rash  Heart: Regular rate and rhythm, S1S2 present or without murmur or extra heart sounds  Abdomen: non distended, bowel sounds normoactive, tympanic, abdomen is soft without significant tenderness, masses, organomegaly or guarding, rigidity, rebound  Extremity: trace to 1+pitting on L leg and hand, improving. No edema on right.    Capillary refill: normal  Neuro: responds to voice, moves RUE and RLE extremities, weak on L side, no involuntary movements, exam limitations  Skin: Skin color, texture, turgor fair.  Skin dry, warm, non-diaphoretic    Data:     Recent Results (from the past 24 hour(s))   ECHO ADULT COMPLETE    Collection Time: 10/18/19 10:02 AM   Result Value Ref Range    LA Volume 50.54 18 - 58 mL    Right Atrial Area 4C 18.20 cm2    Ao Root D 3.98 cm    AO ASC D 3.20 cm    Aortic Valve Systolic Peak Velocity 907.35 cm/s    Aortic Valve Area by Continuity of VTI 3.1 cm2    AoV PG 5.3 mmHg    LVIDs 3.80 cm    LV ED Vol A2C 82.0 mL    LV ES Vol A4C 75.0 mL    LVOT d 2.1 cm    LVOT Peak Velocity 99.00 cm/s    LVOT Peak Gradient 1.7 mmHg    LV E' Septal Velocity 12.00 cm/s    LV E' Lateral Velocity 22.00 cm/s    MV E King 137.00 cm/s    Aortic Valve Systolic Mean Gradient 3.1 mmHg    BP EF 34.5 55 - 100 %    LV Ejection Fraction MOD 4C 31.0 %    LV Ejection Fraction MOD 2C 38.0 %    LA Vol 4C 56.0 18 - 58 mL    LA Vol 2C 42.0 18 - 58 mL    LA Area 2C 15.6 cm2    LA Area 4C 18.6 cm2    LV Mass .6 (A) 88 - 224 g    LV Mass AL Index 181.3 49 - 115 g/m2    E/E' lateral 6.23     E/E' septal 11.42     TAPSV 2.4 cm/s    E/E' ratio (averaged) 8.82     LV ES Vol A2C 46.0 mL    LV ED Vol A4C 108.0 mL    Mitral Valve E-point Septal Separation 1.40 cm    Mitral Valve E Wave Deceleration Time 80.0 ms    Right Atrium Minor Axis 4.30 cm    Left Atrium Major Axis 4.20 cm    LA Vol Index 23.52 16 - 28 ml/m2    Right Atrial Volume 66.0 ml    LA Vol Index 19.55 16 - 28 ml/m2    LA Vol Index 26.06 16 - 28 ml/m2    LVED Vol Index A4C 50.3 mL/m2    LVED Vol Index A2C 38.2 mL/m2    LVES Vol Index A4C 34.9 mL/m2    LVES Vol Index A2C 21.4 mL/m2    LVIDd 5.20 (A) 4.2 - 5.9 cm    LVPWd 1.10 (A) 0.6 - 1.0 cm    IVSd 1.00 0.6 - 1.0 cm    LV ES Vol BP 60.0 22 - 58 mL    LVES Vol Index BP 27.9 mL/m2    LV ED Vol .0 67 - 155 ml    LVED Vol Index BP 60.0 mL/m2   GLUCOSE, POC    Collection Time: 10/18/19 11:53 AM   Result Value Ref Range    Glucose (POC) 162 (H) 70 - 110 mg/dL   VANCOMYCIN, RANDOM    Collection Time: 10/18/19 12:28 PM   Result Value Ref Range    Vancomycin, random 13.1 5.0 - 40.0 UG/ML   GLUCOSE, POC    Collection Time: 10/18/19  6:15 PM   Result Value Ref Range    Glucose (POC) 198 (H) 70 - 110 mg/dL   GLUCOSE, POC    Collection Time: 10/18/19 11:20 PM   Result Value Ref Range    Glucose (POC) 219 (H) 70 - 110 mg/dL   GLUCOSE, POC    Collection Time: 10/18/19 11:24 PM   Result Value Ref Range    Glucose (POC) 250 (H) 70 - 110 mg/dL   CBC WITH AUTOMATED DIFF    Collection Time: 10/19/19  4:00 AM   Result Value Ref Range    WBC 9.2 4.6 - 13.2 K/uL    RBC 3.00 (L) 4.70 - 5.50 M/uL    HGB 8.1 (L) 13.0 - 16.0 g/dL    HCT 26.4 (L) 36.0 - 48.0 %    MCV 88.0 74.0 - 97.0 FL    MCH 27.0 24.0 - 34.0 PG    MCHC 30.7 (L) 31.0 - 37.0 g/dL    RDW 15.1 (H) 11.6 - 14.5 %    PLATELET 125 (L) 232 - 420 K/uL    MPV 11.7 9.2 - 11.8 FL    NEUTROPHILS 76 (H) 40 - 73 %    LYMPHOCYTES 11 (L) 21 - 52 %    MONOCYTES 11 (H) 3 - 10 %    EOSINOPHILS 2 0 - 5 %    BASOPHILS 0 0 - 2 %    ABS. NEUTROPHILS 7.0 1.8 - 8.0 K/UL    ABS. LYMPHOCYTES 1.0 0.9 - 3.6 K/UL    ABS. MONOCYTES 1.0 0.05 - 1.2 K/UL    ABS. EOSINOPHILS 0.2 0.0 - 0.4 K/UL    ABS.  BASOPHILS 0.0 0.0 - 0.1 K/UL    DF AUTOMATED     METABOLIC PANEL, BASIC    Collection Time: 10/19/19  4:00 AM   Result Value Ref Range    Sodium 143 136 - 145 mmol/L    Potassium 3.5 3.5 - 5.5 mmol/L    Chloride 104 100 - 111 mmol/L    CO2 32 21 - 32 mmol/L    Anion gap 7 3.0 - 18 mmol/L    Glucose 105 (H) 74 - 99 mg/dL    BUN 54 (H) 7.0 - 18 MG/DL    Creatinine 2.88 (H) 0.6 - 1.3 MG/DL    BUN/Creatinine ratio 19 12 - 20      GFR est AA 26 (L) >60 ml/min/1.73m2    GFR est non-AA 21 (L) >60 ml/min/1.73m2    Calcium 8.3 (L) 8.5 - 10.1 MG/DL   GLUCOSE, POC    Collection Time: 10/19/19  6:14 AM   Result Value Ref Range    Glucose (POC) 147 (H) 70 - 110 mg/dL           Recent Labs     10/17/19  1029 10/16/19  2147   FIO2I 35 100   HCO3I 31.8* 29.8*   PCO2I 51.3* 54.0*   PHI 7.400 7.348*   PO2I 83 156* All Micro Results     Procedure Component Value Units Date/Time    CULTURE, URINE [[id number]] Collected:  10/17/19 1100    Order Status:  Completed Specimen:  Urine from [place] Specimen Updated:  10/19/19 0855     Special Requests: NO SPECIAL REQUESTS        Culture result: NO GROWTH 2 DAYS       CULTURE, RESPIRATORY/SPUTUM/BRONCH Dee Dee Pimple STAIN [[id number]] Collected:  10/17/19 1010    Order Status:  Completed Specimen:  Sputum Updated:  10/19/19 0011     Special Requests: NO SPECIAL REQUESTS        GRAM STAIN >25 WBC/lpf         <10 EPI/lpf         MUCUS PRESENT         NO ORGANISMS SEEN        Culture result: PENDING    CULTURE, BLOOD [[id number]] Collected:  10/16/19 2130    Order Status:  Completed Specimen:  Blood Updated:  10/18/19 0554     Special Requests: NO SPECIAL REQUESTS        Culture result: NO GROWTH 2 DAYS       CULTURE, BLOOD [[id number]] Collected:  10/16/19 2125    Order Status:  Completed Specimen:  Blood Updated:  10/18/19 0554     Special Requests: NO SPECIAL REQUESTS        Culture result: NO GROWTH 2 DAYS             Telemetry: normal sinus rhythm    8/7/19 ECHO  · Left Ventricle: Normal cavity size and systolic function (ejection fraction normal). Mild concentric hypertrophy. The estimated ejection fraction is 51 - 55%. Abnormal wall motion as described on the wall scoring diagram below. Unable to assess diastolic function. E/E' ratio is 7.78. Wall Scoring: The following segments are hypokinetic: basal inferior, basal inferolateral, mid inferior and apical inferior. All other segments are normal.  · Left Atrium: Mildly dilated left atrium. · Tricuspid Valve: Tricuspid valve not well visualized. No stenosis. Tricuspid regurgitation is inadequate for estimation of right ventricular systolic pressure. There is no evidence of pulmonary hypertension    Imaging:  [x]I have personally reviewed the patients chest radiographs images and report     Results from [facility] Encounter encounter on 10/16/19   XR CHEST PORT    Narrative EXAM: XR CHEST PORT    CLINICAL INDICATION/HISTORY: CHF follow up  -Additional: None    COMPARISON: Several prior exams, most recently 10/16/2019    TECHNIQUE: Frontal view of the chest    _______________    FINDINGS:    HEART AND MEDIASTINUM: Stable cardiac size and mediastinal contours. LUNGS AND PLEURAL SPACES: Improved aeration of the right mid and bilateral lower  lung zones noted. No pneumothorax. Left retrocardiac opacity similar to prior  avel background of mildly underexpanded lungs. Small bilateral pleural  effusions suspected, as previously. BONY THORAX AND SOFT TISSUES: No acute osseous abnormality    _______________      Impression IMPRESSION:      1. Underexpanded lungs and left retrocardiac atelectasis similar to prior with  improved interstitial and alveolar edema. Suspect small bilateral pleural  effusions without significant change. Results from [place] encounter on 10/16/19   CT HEAD WO CONT    Narrative EXAM: CT head    INDICATION: Left-sided weakness    COMPARISON: None. TECHNIQUE: Axial CT imaging of the head was performed without intravenous  contrast. One or more dose reduction techniques were used on this CT: automated  exposure control, adjustment of the mAs and/or kVp according to patient size,  and iterative reconstruction techniques. The specific techniques used on this  CT exam have been documented in the patient's electronic medical record. Digital  Imaging and Communications in Medicine (DICOM) format image data are available  to nonaffiliated external healthcare facilities or entities on a secure, media  free, reciprocally searchable basis with patient authorization for at least a  12-month period after this study. _______________    FINDINGS:    BRAIN AND POSTERIOR FOSSA: There is age-appropriate atrophy. There is no  intracranial hemorrhage, mass effect, or midline shift.  There is an old left  cerebellar hemispheric infarct with encephalomalacia. Is also an old left  occipital lobe infarct with encephalomalacia. Significant small vessel ischemic  disease present. EXTRA-AXIAL SPACES AND MENINGES: There are no abnormal extra-axial fluid  collections. CALVARIUM: Intact. SINUSES: Clear. OTHER: None.    _______________      Impression IMPRESSION:    No acute intracranial abnormalities. Atrophy with small vessel ischemic disease and old infarcts. PVL LE 10/18/19:  · No evidence of deep vein thrombosis in the right lower extremity veins assessed. · No evidence of deep vein thrombosis in the left lower extremity veins assessed. Echo 10/18/19:  · Left Ventricle: Normal cavity size and wall thickness. Moderate systolic dysfunction. The estimated ejection fraction is 36- 40%. Unable to assess diastolic function. E/E' ratio is 8.82. Wall Scoring: The left ventricular wall motion is globally hypokinetic. · Tricuspid Valve: Normal valve structure and no stenosis. Tricuspid regurgitation is inadequate for estimation of right ventricular systolic pressure.       [x]See my orders for details    My assessment, plan of care, findings, medications, side effects etc were discussed with:  [x]nursing []PT/OT    [x]respiratory therapy []   [x]family []Patient       Sidra Carrel, MD  10/19/2019

## 2019-10-19 NOTE — PROGRESS NOTES
Hospitalist Progress Note    Patient: Berto Washington MRN: [id number]  [facility]: [id number]    YOB: 1935  Age: 81 y.o. Sex: male    DOA: 10/16/2019 LOS:  LOS: 3 days              IMPRESSION and Plan:    Berto Washington is a 81 y.o. male with   Patient Active Problem List    Diagnosis Date Noted    NSTEMI (non-ST elevated myocardial infarction) ([facility] 10/17/2019    Acute respiratory failure ([facility] 10/16/2019    Acute on chronic combined systolic and diastolic CHF (congestive heart failure) ([facility] 10/16/2019    Acute exacerbation of CHF (congestive heart failure) ([facility] 08/07/2019    CKD (chronic kidney disease) stage 3, GFR 30-59 ml/min ([facility] 08/07/2019    Dementia ([facility] 08/07/2019    History of CVA (cerebrovascular accident) 08/07/2019    Insulin dependent diabetes mellitus with complications ([facility] 52/80/3582    UTI (urinary tract infection) 08/07/2019     Principal Problem:    Acute respiratory failure ([facility] (10/16/2019)    Active Problems:    CKD (chronic kidney disease) stage 3, GFR 30-59 ml/min ([facility]) (8/7/2019)      UTI (urinary tract infection) (8/7/2019)      Acute on chronic combined systolic and diastolic CHF (congestive heart failure) ([facility] (10/16/2019)      NSTEMI (non-ST elevated myocardial infarction) ([facility] (10/17/2019)      Okay to transfer to tele  Labs as ordered  Change to po Augmentin    PT/OT       Patient's condition is fair        Recommend to continue hospitalization. Discussed with patient. Chief Complaints:   Chief Complaint   Patient presents with    Respiratory Distress     SUBJECTIVE:  Pt is seen and examined. Chart reviwed. awake. Was on BIPAP last night      Review of systems:    Review of Systems   Constitutional: Positive for malaise/fatigue. HENT: Negative. Eyes: Negative. Respiratory: Positive for shortness of breath. Cardiovascular: Positive for leg swelling. Negative for chest pain, palpitations and orthopnea. Gastrointestinal: Negative.   Negative for abdominal pain, diarrhea and heartburn. Genitourinary: Negative for dysuria and hematuria. Skin: Negative. Neurological: Positive for weakness. Psychiatric/Behavioral: Positive for depression and memory loss. Negative for substance abuse and suicidal ideas. The patient is nervous/anxious. PE:  Patient Vitals for the past 24 hrs:   BP Temp Pulse Resp SpO2   10/19/19 1400 111/70  96 24 99 %   10/19/19 1300 110/61  94  99 %   10/19/19 1230    17    10/19/19 1200 103/53  85 28 97 %   10/19/19 1100 120/65  99 (!) 34 94 %   10/19/19 1000 150/66  (!) 106 (!) 32 98 %   10/19/19 0945 142/68  (!) 103     10/19/19 0930    22    10/19/19 0900 142/68       10/19/19 0800 136/68  (!) 104 24 98 %   10/19/19 0600 118/70  (!) 110 18 100 %   10/19/19 0500 149/80  (!) 105 27 97 %   10/19/19 0400 152/81 98 °F (36.7 °C) 96 15 100 %   10/19/19 0300 140/69  98 23 99 %   10/19/19 0200 122/67  84 24 99 %   10/19/19 0100 140/71  75 15 100 %   10/19/19 0000     (!) 77 %   10/18/19 2300 144/70 98.3 °F (36.8 °C) 82 11 100 %   10/18/19 2100 142/87  (!) 103 22 98 %   10/18/19 1900 119/65 98.2 °F (36.8 °C) 89 21 94 %   10/18/19 1800 134/61  97 24 94 %   10/18/19 1700 140/70  96 22 99 %       Intake/Output Summary (Last 24 hours) at 10/19/2019 1619  Last data filed at 10/19/2019 1200  Gross per 24 hour   Intake 812.08 ml   Output 2750 ml   Net -1937.92 ml     Patient Vitals for the past 120 hrs:   Weight   10/16/19 2114 102.1 kg (225 lb)   10/18/19 1002 102.1 kg (225 lb)         Physical Exam   Constitutional: He appears distressed. Neck: Normal range of motion. Neck supple. No JVD present. Cardiovascular: Normal rate, regular rhythm and normal heart sounds. Pulmonary/Chest: Breath sounds normal. He is in respiratory distress. Abdominal: Soft. Bowel sounds are normal. He exhibits no distension. There is no tenderness. There is no rebound. Musculoskeletal: Normal range of motion.  He exhibits no edema. Neurological: He is alert. Skin: Skin is warm. Psychiatric: Affect normal.   Nursing note and vitals reviewed.           Intake and Output:  Current Shift:  10/19 0701 - 10/19 1900  In: -   Out: 600 [Urine:600]  Last three shifts:  10/17 1901 - 10/19 0700  In: 1166.3 [I.V.:1166.3]  Out: 3250 [Urine:3250]    Lab/Data Reviewed:  Recent Results (from the past 8 hour(s))   GLUCOSE, POC    Collection Time: 10/19/19 12:18 PM   Result Value Ref Range    Glucose (POC) 272 (H) 70 - 110 mg/dL     Medications:  Current Facility-Administered Medications   Medication Dose Route Frequency    amoxicillin-clavulanate (AUGMENTIN) 875-125 mg per tablet 1 Tab  1 Tab Oral Q12H    [START ON 10/20/2019] furosemide (LASIX) injection 40 mg  40 mg IntraVENous DAILY    dextrose 5% infusion  25 mL/hr IntraVENous CONTINUOUS    perflutren lipid microspheres (DEFINITY) in NS bolus IV  1 mL IntraVENous PRN    nystatin (MYCOSTATIN) 100,000 unit/mL oral suspension 500,000 Units  500,000 Units Oral QID    QUEtiapine (SEROquel) tablet 25 mg  25 mg Oral QHS    metoprolol succinate (TOPROL-XL) XL tablet 25 mg  25 mg Oral DAILY    alfuzosin SR (UROXATRAL) tablet 10 mg  10 mg Oral DAILY    atorvastatin (LIPITOR) tablet 40 mg  40 mg Oral DAILY    isosorbide mononitrate ER (IMDUR) tablet 60 mg  60 mg Oral DAILY    apixaban (ELIQUIS) tablet 2.5 mg  2.5 mg Oral BID    albuterol-ipratropium (DUO-NEB) 2.5 MG-0.5 MG/3 ML  3 mL Nebulization Q6H PRN    sodium chloride (NS) flush 5-10 mL  5-10 mL IntraVENous PRN    sodium chloride (NS) flush 5-40 mL  5-40 mL IntraVENous Q8H    sodium chloride (NS) flush 5-40 mL  5-40 mL IntraVENous PRN    pantoprazole (PROTONIX) 40 mg in sodium chloride 0.9% 10 mL injection  40 mg IntraVENous DAILY    insulin lispro (HUMALOG) injection   SubCUTAneous Q6H    glucose chewable tablet 16 g  16 g Oral PRN    glucagon (GLUCAGEN) injection 1 mg  1 mg IntraMUSCular PRN    dextrose 10% infusion 125-250 mL  125-250 mL IntraVENous PRN    hydrALAZINE (APRESOLINE) 20 mg/mL injection 10 mg  10 mg IntraVENous Q6H PRN       Recent Results (from the past 24 hour(s))   GLUCOSE, POC    Collection Time: 10/18/19  6:15 PM   Result Value Ref Range    Glucose (POC) 198 (H) 70 - 110 mg/dL   GLUCOSE, POC    Collection Time: 10/18/19 11:20 PM   Result Value Ref Range    Glucose (POC) 219 (H) 70 - 110 mg/dL   GLUCOSE, POC    Collection Time: 10/18/19 11:24 PM   Result Value Ref Range    Glucose (POC) 250 (H) 70 - 110 mg/dL   CBC WITH AUTOMATED DIFF    Collection Time: 10/19/19  4:00 AM   Result Value Ref Range    WBC 9.2 4.6 - 13.2 K/uL    RBC 3.00 (L) 4.70 - 5.50 M/uL    HGB 8.1 (L) 13.0 - 16.0 g/dL    HCT 26.4 (L) 36.0 - 48.0 %    MCV 88.0 74.0 - 97.0 FL    MCH 27.0 24.0 - 34.0 PG    MCHC 30.7 (L) 31.0 - 37.0 g/dL    RDW 15.1 (H) 11.6 - 14.5 %    PLATELET 884 (L) 546 - 420 K/uL    MPV 11.7 9.2 - 11.8 FL    NEUTROPHILS 76 (H) 40 - 73 %    LYMPHOCYTES 11 (L) 21 - 52 %    MONOCYTES 11 (H) 3 - 10 %    EOSINOPHILS 2 0 - 5 %    BASOPHILS 0 0 - 2 %    ABS. NEUTROPHILS 7.0 1.8 - 8.0 K/UL    ABS. LYMPHOCYTES 1.0 0.9 - 3.6 K/UL    ABS. MONOCYTES 1.0 0.05 - 1.2 K/UL    ABS. EOSINOPHILS 0.2 0.0 - 0.4 K/UL    ABS.  BASOPHILS 0.0 0.0 - 0.1 K/UL    DF AUTOMATED     METABOLIC PANEL, BASIC    Collection Time: 10/19/19  4:00 AM   Result Value Ref Range    Sodium 143 136 - 145 mmol/L    Potassium 3.5 3.5 - 5.5 mmol/L    Chloride 104 100 - 111 mmol/L    CO2 32 21 - 32 mmol/L    Anion gap 7 3.0 - 18 mmol/L    Glucose 105 (H) 74 - 99 mg/dL    BUN 54 (H) 7.0 - 18 MG/DL    Creatinine 2.88 (H) 0.6 - 1.3 MG/DL    BUN/Creatinine ratio 19 12 - 20      GFR est AA 26 (L) >60 ml/min/1.73m2    GFR est non-AA 21 (L) >60 ml/min/1.73m2    Calcium 8.3 (L) 8.5 - 10.1 MG/DL   GLUCOSE, POC    Collection Time: 10/19/19  6:14 AM   Result Value Ref Range    Glucose (POC) 147 (H) 70 - 110 mg/dL   GLUCOSE, POC    Collection Time: 10/19/19 12:18 PM   Result Value Ref Range    Glucose (POC) 272 (H) 70 - 110 mg/dL       Procedures/imaging: see electronic medical records for all procedures/Xrays and details which were not copied into this note but were reviewed prior to creation of Mei Khalil MD   10/19/2019, 4:19 PM

## 2019-10-19 NOTE — PROGRESS NOTES
Cardiology Progress Note        Patient: Danika Landaverde        Sex: male          DOA: 10/16/2019  YOB: 1935      Age:  81 y.o.        LOS:  LOS: 2 days   Assessment/Plan     Patient Active Problem List   Diagnosis Code    Acute exacerbation of CHF (congestive heart failure) ([facility]) I50.9    CKD (chronic kidney disease) stage 3, GFR 30-59 ml/min ([facility]) N18.3    Dementia ([facility]) F03.90    History of CVA (cerebrovascular accident) Z86.73    Insulin dependent diabetes mellitus with complications ([facility] R88.0, Z79.4    UTI (urinary tract infection) N39.0    Acute respiratory failure ([facility]) J96.00    Acute on chronic combined systolic and diastolic CHF (congestive heart failure) ([facility]) I50.43    NSTEMI (non-ST elevated myocardial infarction) [facility] I21.4        Plan:    Discontinue Norvasc  Start Metoprolol succinate 25 mg once a day. Continue Nitrates. No aspirin due to risk of bleeding with concomitant anticoagulation therapy. Continue statin. Continue IV Lasix  Strict I/O  Monitor renal function  Continue management as per hospital medicine               Subjective:    cc:  Denies any chest pain. My breathing is better      REVIEW OF SYSTEMS:     General: No fevers or chills. Cardiovascular: No chest pain,No palpitations, No orthopnea, No PND, No leg swelling, No claudication  Pulmonary: Positive dyspnea. Gastrointestinal: No nausea, vomiting, bleeding  Neurology: No Dizziness    Objective:      Visit Vitals  /70   Pulse 96   Temp 99 °F (37.2 °C)   Resp 22   Ht 5' 8\" (1.727 m)   Wt 102.1 kg (225 lb)   SpO2 99%   BMI 34.21 kg/m²     Body mass index is 34.21 kg/m². Physical Exam:  General Appearance: Comfortable, not using accessory muscles of respiration. HEENT: DELONTE. HEAD: Atraumatic  NECK: No JVD, no thyroidomeglay. CAROTIDS: No bruit  LUNGS: Clear bilaterally. HEART: S1+S2 audible, no murmur, no pericardial rub.   ABD: Non-tender, BS Audible    EXT: No edema, and no cyanosis. VASCULAR EXAM: Pulses are intact.     CNS: Alert, follows verbal commands  Medication:  Current Facility-Administered Medications   Medication Dose Route Frequency    dextrose 5% infusion  25 mL/hr IntraVENous CONTINUOUS    perflutren lipid microspheres (DEFINITY) in NS bolus IV  1 mL IntraVENous PRN    nystatin (MYCOSTATIN) 100,000 unit/mL oral suspension 500,000 Units  500,000 Units Oral QID    QUEtiapine (SEROquel) tablet 25 mg  25 mg Oral QHS    alfuzosin SR (UROXATRAL) tablet 10 mg  10 mg Oral DAILY    amLODIPine (NORVASC) tablet 5 mg  5 mg Oral DAILY    atorvastatin (LIPITOR) tablet 40 mg  40 mg Oral DAILY    isosorbide mononitrate ER (IMDUR) tablet 60 mg  60 mg Oral DAILY    apixaban (ELIQUIS) tablet 2.5 mg  2.5 mg Oral BID    piperacillin-tazobactam (ZOSYN) 3.375 g in 0.9% sodium chloride (MBP/ADV) 100 mL MBP  3.375 g IntraVENous Q8H    furosemide (LASIX) injection 40 mg  40 mg IntraVENous BID    albuterol-ipratropium (DUO-NEB) 2.5 MG-0.5 MG/3 ML  3 mL Nebulization Q6H PRN    sodium chloride (NS) flush 5-10 mL  5-10 mL IntraVENous PRN    sodium chloride (NS) flush 5-40 mL  5-40 mL IntraVENous Q8H    sodium chloride (NS) flush 5-40 mL  5-40 mL IntraVENous PRN    pantoprazole (PROTONIX) 40 mg in sodium chloride 0.9% 10 mL injection  40 mg IntraVENous DAILY    insulin lispro (HUMALOG) injection   SubCUTAneous Q6H    glucose chewable tablet 16 g  16 g Oral PRN    glucagon (GLUCAGEN) injection 1 mg  1 mg IntraMUSCular PRN    dextrose 10% infusion 125-250 mL  125-250 mL IntraVENous PRN    hydrALAZINE (APRESOLINE) 20 mg/mL injection 10 mg  10 mg IntraVENous Q6H PRN               Lab/Data Reviewed:       Recent Labs     10/18/19  0410 10/17/19  0308 10/16/19  2125   WBC 9.5 15.4* 9.1   HGB 8.6* 9.5* 9.6*   HCT 28.1* 30.9* 31.5*   * 143 147     Recent Labs     10/18/19  0410 10/17/19  0308 10/16/19  2125    142 142   K 3.8 4.7 4.7    106 106   CO2 33* 33* 30   GLU 95 138* 236*   BUN 50* 45* 43*   CREA 2.64* 2.23* 2.20*   CA 8.5 8.5 8.5     38 minutes of critical care time spent in the direct evaluation and treatment of this high risk patient. The reason for providing this level of medical care for this critically ill patient was due a critical illness that impaired one or more vital organ systems such that there was a high probability of imminent or life threatening deterioration in the patients condition. This care involved high complexity decision making to assess, manipulate, and support vital system functions, to treat this degree vital organ system failure and to prevent further life threatening deterioration of the patients condition.     Signed By: Brianda Joseph MD     October 18, 2019

## 2019-10-19 NOTE — PROGRESS NOTES
@1900 Pt taken over awake alert and oriented to self. Vital signs fluctuates. Remains in 2L/ nc spo2 100%. Hernandez remains in situ draining eyal clear urine. Assessment done and charted in appropriate flow sheets. Nursing management continues . @2300 pt reassessed no changes. No relatives present and pt remains confused unable to comple    @0050 pt pulled off BIPAP 4 times and refused to wear it . Was placed back on 2L/ nc . RT informed spo2 remains 100%  Observation continues. @0300 pt reassessed no new developments continues to be monitored. @1565 Bedside and Verbal shift change report given to Leda Osorio (oncoming nurse) by Quinten Rust (offgoing nurse). Report included the following information SBAR, Kardex, Intake/Output, MAR, Recent Results, Med Rec Status and Alarm Parameters .

## 2019-10-19 NOTE — PROGRESS NOTES
Problem: Mobility Impaired (Adult and Pediatric)  Goal: *Acute Goals and Plan of Care (Insert Text)  Description  Physical Therapy Goals  Initiated 10/19/2019 and to be accomplished within 7 day(s)  1. Patient will move from supine to sit and sit to supine  in bed with moderate assistance . 2.  Patient will transfer from bed to chair and chair to bed with moderate assistance  using the least restrictive device. 3.  Patient will perform sit to stand with moderate assistance . Outcome: Progressing Towards Goal   PHYSICAL THERAPY EVALUATION & DISCHARGE    Patient: Radha Mcclelland (36 y.o. male)  Date: 10/19/2019  Primary Diagnosis: CHF exacerbation ([facility]) [I50.9]  Acute respiratory failure ([facility]) [J96.00]  Acute on chronic combined systolic and diastolic [facility] [I50.43]  Procedure(s) (LRB):  ESOPHAGOGASTRODUODENOSCOPY (EGD) (N/A) 2 Days Post-Op   Precautions:  Fall    ASSESSMENT AND RECOMMENDATIONS:  Based on the objective data described below, the patient presents with lower extremity weakness, decreased gait quality and endurance, impaired bed mobility and transfers, decreased DEAN LE and UE ROM/flexibility (L>R), and overall limitations in functional mobility s/p above Dx. Per nrs and notes, pt is near baseline and is bed bound. Pt demo's decreased use of LUE and slightly agitated upon initiation of session. AOx1 to person. Pt total A to sit EOB with little use of L UE and LE. Bed mob peformed for positioning req total A. Pt left in NAD, all needs met, call bell within reach, bed alarm on, nrs aware. Patient would benefit from skilled inpatient physical therapy to address deficits, progress as tolerated to achieve long term goals and allow safe discharge. .  Skilled physical therapy is not indicated at this time d/t pt being at baseline status.   Discharge Recommendations: LTCF   Further Equipment Recommendations for Discharge: N/A      SUBJECTIVE:   Patient stated I didn't pull that thing off.     OBJECTIVE DATA SUMMARY:     Past Medical History:   Diagnosis Date    CAD (coronary artery disease)     Chronic kidney disease     Dementia ([facility]     Diabetes ([facility]     Hypertension     Stroke [facility])    History reviewed. No pertinent surgical history. Barriers to Learning/Limitations: yes;  altered mental status (i.e.Sedation, Confusion)  Compensate with: visual, verbal, tactile, kinesthetic cues/model  Prior Level of Function/Home Situation: Unable to obtain PLOF or PMHx info from pt. Home Situation  Home Environment: (pt unable to provide)  Critical Behavior:  Neurologic State: Confused; Agitated  Orientation Level: Oriented to person  Strength:    Strength: Generally decreased, functional  Range Of Motion:  AROM: Generally decreased, functional  PROM: Generally decreased, functional  Functional Mobility:  Bed Mobility:  Rolling: Total assistance  Supine to Sit: Total assistance  Sit to Supine: Total assistance  Pain:  Pain Scale 1: Numeric (0 - 10)  Pain Intensity 1: 0  Activity Tolerance:   fair  Please refer to the flowsheet for vital signs taken during this treatment. After treatment:   ?         Patient left in no apparent distress sitting up in chair  ? Patient left in no apparent distress in bed  ? Call bell left within reach  ? Nursing notified  ? Caregiver present  ? Bed alarm activated    COMMUNICATION/EDUCATION:   ?         Fall prevention education was provided and the patient/caregiver indicated understanding. ? Patient/family have participated as able in goal setting and plan of care. ?         Patient/family agree to work toward stated goals and plan of care. ?         Patient understands intent and goals of therapy, but is neutral about his/her participation. ? Patient is unable to participate in goal setting and plan of care.     Thank you for this referral.  Jabari Santos   Time Calculation: 23 mins   Marli Complexity: History: MEDIUM  Complexity : 1-2 comorbidities / personal factors will impact the outcome/ POC Exam:MEDIUM Complexity : 3 Standardized tests and measures addressing body structure, function, activity limitation and / or participation in recreation  Presentation: MEDIUM Complexity : Evolving with changing characteristics  Clinical Decision Making:Medium Complexity d/t baseline confusion  Overall Complexity:MEDIUM

## 2019-10-20 LAB
ANION GAP SERPL CALC-SCNC: 6 MMOL/L (ref 3–18)
BASOPHILS # BLD: 0 K/UL (ref 0–0.1)
BASOPHILS NFR BLD: 0 % (ref 0–2)
BUN SERPL-MCNC: 55 MG/DL (ref 7–18)
BUN/CREAT SERPL: 19 (ref 12–20)
CALCIUM SERPL-MCNC: 8.8 MG/DL (ref 8.5–10.1)
CHLORIDE SERPL-SCNC: 105 MMOL/L (ref 100–111)
CO2 SERPL-SCNC: 32 MMOL/L (ref 21–32)
CREAT SERPL-MCNC: 2.87 MG/DL (ref 0.6–1.3)
DIFFERENTIAL METHOD BLD: ABNORMAL
EOSINOPHIL # BLD: 0.4 K/UL (ref 0–0.4)
EOSINOPHIL NFR BLD: 5 % (ref 0–5)
ERYTHROCYTE [DISTWIDTH] IN BLOOD BY AUTOMATED COUNT: 15.1 % (ref 11.6–14.5)
FOLATE SERPL-MCNC: >20 NG/ML (ref 3.1–17.5)
GLUCOSE BLD STRIP.AUTO-MCNC: 167 MG/DL (ref 70–110)
GLUCOSE BLD STRIP.AUTO-MCNC: 178 MG/DL (ref 70–110)
GLUCOSE BLD STRIP.AUTO-MCNC: 211 MG/DL (ref 70–110)
GLUCOSE BLD STRIP.AUTO-MCNC: 323 MG/DL (ref 70–110)
GLUCOSE SERPL-MCNC: 163 MG/DL (ref 74–99)
HCT VFR BLD AUTO: 25.7 % (ref 36–48)
HGB BLD-MCNC: 8 G/DL (ref 13–16)
IRON SATN MFR SERPL: 25 %
IRON SERPL-MCNC: 32 UG/DL (ref 50–175)
LYMPHOCYTES # BLD: 1.3 K/UL (ref 0.9–3.6)
LYMPHOCYTES NFR BLD: 16 % (ref 21–52)
MCH RBC QN AUTO: 27.8 PG (ref 24–34)
MCHC RBC AUTO-ENTMCNC: 31.1 G/DL (ref 31–37)
MCV RBC AUTO: 89.2 FL (ref 74–97)
MONOCYTES # BLD: 0.7 K/UL (ref 0.05–1.2)
MONOCYTES NFR BLD: 9 % (ref 3–10)
NEUTS SEG # BLD: 5.7 K/UL (ref 1.8–8)
NEUTS SEG NFR BLD: 70 % (ref 40–73)
PLATELET # BLD AUTO: 126 K/UL (ref 135–420)
PMV BLD AUTO: 11.5 FL (ref 9.2–11.8)
POTASSIUM SERPL-SCNC: 3.4 MMOL/L (ref 3.5–5.5)
RBC # BLD AUTO: 2.88 M/UL (ref 4.7–5.5)
SODIUM SERPL-SCNC: 143 MMOL/L (ref 136–145)
TIBC SERPL-MCNC: 127 UG/DL (ref 250–450)
VIT B12 SERPL-MCNC: 1431 PG/ML (ref 211–911)
WBC # BLD AUTO: 8.1 K/UL (ref 4.6–13.2)

## 2019-10-20 PROCEDURE — 85025 COMPLETE CBC W/AUTO DIFF WBC: CPT

## 2019-10-20 PROCEDURE — 74011250637 HC RX REV CODE- 250/637: Performed by: INTERNAL MEDICINE

## 2019-10-20 PROCEDURE — 83540 ASSAY OF IRON: CPT

## 2019-10-20 PROCEDURE — 74011636637 HC RX REV CODE- 636/637: Performed by: INTERNAL MEDICINE

## 2019-10-20 PROCEDURE — 65660000000 HC RM CCU STEPDOWN

## 2019-10-20 PROCEDURE — 77010033678 HC OXYGEN DAILY

## 2019-10-20 PROCEDURE — 74011636637 HC RX REV CODE- 636/637: Performed by: FAMILY MEDICINE

## 2019-10-20 PROCEDURE — 36415 COLL VENOUS BLD VENIPUNCTURE: CPT

## 2019-10-20 PROCEDURE — 94640 AIRWAY INHALATION TREATMENT: CPT

## 2019-10-20 PROCEDURE — 80048 BASIC METABOLIC PNL TOTAL CA: CPT

## 2019-10-20 PROCEDURE — 74011000250 HC RX REV CODE- 250: Performed by: FAMILY MEDICINE

## 2019-10-20 PROCEDURE — 74011000250 HC RX REV CODE- 250: Performed by: INTERNAL MEDICINE

## 2019-10-20 PROCEDURE — 77030011256 HC DRSG MEPILEX <16IN NO BORD MOLN -A

## 2019-10-20 PROCEDURE — 77030040831 HC BAG URINE DRNG MDII -A

## 2019-10-20 PROCEDURE — 74011250636 HC RX REV CODE- 250/636: Performed by: FAMILY MEDICINE

## 2019-10-20 PROCEDURE — 82607 VITAMIN B-12: CPT

## 2019-10-20 PROCEDURE — 77030040361 HC SLV COMPR DVT MDII -B

## 2019-10-20 PROCEDURE — 74011250637 HC RX REV CODE- 250/637: Performed by: FAMILY MEDICINE

## 2019-10-20 PROCEDURE — 74011250636 HC RX REV CODE- 250/636: Performed by: INTERNAL MEDICINE

## 2019-10-20 PROCEDURE — C9113 INJ PANTOPRAZOLE SODIUM, VIA: HCPCS | Performed by: FAMILY MEDICINE

## 2019-10-20 PROCEDURE — 82962 GLUCOSE BLOOD TEST: CPT

## 2019-10-20 RX ADMIN — APIXABAN 2.5 MG: 2.5 TABLET, FILM COATED ORAL at 11:29

## 2019-10-20 RX ADMIN — METOPROLOL SUCCINATE 25 MG: 25 TABLET, EXTENDED RELEASE ORAL at 11:30

## 2019-10-20 RX ADMIN — ISOSORBIDE MONONITRATE 60 MG: 60 TABLET, EXTENDED RELEASE ORAL at 11:29

## 2019-10-20 RX ADMIN — QUETIAPINE FUMARATE 25 MG: 25 TABLET ORAL at 22:05

## 2019-10-20 RX ADMIN — IPRATROPIUM BROMIDE AND ALBUTEROL SULFATE 3 ML: .5; 3 SOLUTION RESPIRATORY (INHALATION) at 07:35

## 2019-10-20 RX ADMIN — FUROSEMIDE 40 MG: 10 INJECTION, SOLUTION INTRAMUSCULAR; INTRAVENOUS at 11:28

## 2019-10-20 RX ADMIN — INSULIN LISPRO 2 UNITS: 100 INJECTION, SOLUTION INTRAVENOUS; SUBCUTANEOUS at 18:46

## 2019-10-20 RX ADMIN — Medication 10 ML: at 22:06

## 2019-10-20 RX ADMIN — APIXABAN 2.5 MG: 2.5 TABLET, FILM COATED ORAL at 22:05

## 2019-10-20 RX ADMIN — NYSTATIN 500000 UNITS: 100000 SUSPENSION ORAL at 11:29

## 2019-10-20 RX ADMIN — AMOXICILLIN AND CLAVULANATE POTASSIUM 1 TABLET: 875; 125 TABLET, FILM COATED ORAL at 11:29

## 2019-10-20 RX ADMIN — PANTOPRAZOLE SODIUM 40 MG: 40 INJECTION, POWDER, FOR SOLUTION INTRAVENOUS at 11:28

## 2019-10-20 RX ADMIN — IPRATROPIUM BROMIDE AND ALBUTEROL SULFATE 3 ML: .5; 3 SOLUTION RESPIRATORY (INHALATION) at 23:00

## 2019-10-20 RX ADMIN — Medication 10 ML: at 07:04

## 2019-10-20 RX ADMIN — NYSTATIN 500000 UNITS: 100000 SUSPENSION ORAL at 22:05

## 2019-10-20 RX ADMIN — INSULIN LISPRO 12 UNITS: 100 INJECTION, SOLUTION INTRAVENOUS; SUBCUTANEOUS at 22:05

## 2019-10-20 RX ADMIN — INSULIN LISPRO 4 UNITS: 100 INJECTION, SOLUTION INTRAVENOUS; SUBCUTANEOUS at 13:16

## 2019-10-20 RX ADMIN — NYSTATIN 500000 UNITS: 100000 SUSPENSION ORAL at 13:16

## 2019-10-20 RX ADMIN — ATORVASTATIN CALCIUM 40 MG: 20 TABLET, FILM COATED ORAL at 11:29

## 2019-10-20 RX ADMIN — ALFUZOSIN HYDROCHLORIDE 10 MG: 10 TABLET ORAL at 11:29

## 2019-10-20 RX ADMIN — AMOXICILLIN AND CLAVULANATE POTASSIUM 1 TABLET: 875; 125 TABLET, FILM COATED ORAL at 22:05

## 2019-10-20 NOTE — PROGRESS NOTES
RESPIRATORY NOTE:  RN requesting neb tx for pt with audible wheezes, bilateral coarse wheezes noted, increased air movement noted, nonproductive cough at this time,  Pt feeling a little more comfortable following, lasix due soon, will monitor.

## 2019-10-20 NOTE — PROGRESS NOTES
Hospitalist Progress Note    Patient: Bhakti Khanna MRN: [id number]  CSN: [id number]    YOB: 1935  Age: 81 y.o. Sex: male    DOA: 10/16/2019 LOS:  LOS: 4 days              IMPRESSION and Plan:    Bhakti Khanna is a 81 y.o. male with   Patient Active Problem List    Diagnosis Date Noted    NSTEMI (non-ST elevated myocardial infarction) ([facility] 10/17/2019    Acute respiratory failure ([facility] 10/16/2019    Acute on chronic combined systolic and diastolic CHF (congestive heart failure) ([facility] 10/16/2019    Acute exacerbation of CHF (congestive heart failure) ([facility] 08/07/2019    CKD (chronic kidney disease) stage 3, GFR 30-59 ml/min ([facility] 08/07/2019    [facility] 08/07/2019    History of CVA (cerebrovascular accident) 08/07/2019    Insulin dependent diabetes mellitus with complications ([facility] 24/94/2802    UTI (urinary tract infection) 08/07/2019     Principal Problem:    Acute respiratory failure ([facility] [address] (10/16/2019)    Active Problems:    CKD (chronic kidney disease) stage 3, GFR 30-59 ml/min ([facility]) (8/7/2019)      UTI (urinary tract infection) (8/7/2019)      Acute on chronic combined systolic and diastolic CHF (congestive heart failure) ([facility] (10/16/2019)      NSTEMI (non-ST elevated myocardial infarction) ([facility] (10/17/2019)       Acute resp failure -- much improved. Off BIPAP prn nebs    PRAKASH with ckd stage 3/4 -- cr is stable. 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 donot see any recent iron studies and b12/folate. Will order labs    NSTEMI - cp free    Weakness -- Pt/OT    Demenita -- ? Baseline. .  Will need to contact family     Patient's condition is fair        Recommend to continue hospitalization. Discussed with patient. Chief Complaints:   Chief Complaint   Patient presents with    Respiratory Distress     SUBJECTIVE:  Pt is seen and examined.      \" I feel just fine\"      Review of systems:    Review of Systems   Constitutional: Positive for malaise/fatigue. HENT: Negative. Eyes: Negative. Respiratory: Positive for shortness of breath. Cardiovascular: Negative for chest pain, palpitations, orthopnea and leg swelling. Gastrointestinal: Negative. Negative for abdominal pain, diarrhea and heartburn. Genitourinary: Negative for dysuria and hematuria. Skin: Negative. Neurological: Positive for weakness. Psychiatric/Behavioral: Positive for depression and memory loss. Negative for substance abuse and suicidal ideas. The patient is nervous/anxious. PE:  Patient Vitals for the past 24 hrs:   BP Temp Pulse Resp SpO2 Weight   10/20/19 1125 155/81 98.5 °F (36.9 °C) 87 18 100 %    10/20/19 0735     96 %    10/20/19 0717 146/82 98.7 °F (37.1 °C) 92 20 98 %    10/20/19 0428 146/72 98.5 °F (36.9 °C) 91 18 99 % 97.4 kg (214 lb 12.8 oz)   10/20/19 0101 123/61 97.6 °F (36.4 °C) 88 18 100 %    10/19/19 2159 146/76 99 °F (37.2 °C) 98 20 100 % 97.3 kg (214 lb 8 oz)   10/19/19 1800 104/55  88 19 98 %    10/19/19 1700 103/56  84 23 99 %    10/19/19 1600 103/51 98.9 °F (37.2 °C) 79 21 99 %    10/19/19 1500 102/58  76 17 98 %    10/19/19 1400 111/70  96 24 99 %    10/19/19 1300 110/61  94  99 %    10/19/19 1230    17     10/19/19 1200 103/53 98.2 °F (36.8 °C) 85 28 97 %        Intake/Output Summary (Last 24 hours) at 10/20/2019 1130  Last data filed at 10/19/2019 1809  Gross per 24 hour   Intake 358 ml   Output 900 ml   Net -542 ml     Patient Vitals for the past 120 hrs:   Weight   10/16/19 2114 102.1 kg (225 lb)   10/18/19 1002 102.1 kg (225 lb)   10/19/19 2159 97.3 kg (214 lb 8 oz)   10/20/19 0428 97.4 kg (214 lb 12.8 oz)         Physical Exam   Constitutional: He appears distressed. Neck: Normal range of motion. Neck supple. No JVD present. Cardiovascular: Normal rate, regular rhythm and normal heart sounds. Pulmonary/Chest: No respiratory distress. He has wheezes. Abdominal: Soft.  Bowel sounds are normal. He exhibits no distension. There is no tenderness. There is no rebound. Musculoskeletal: Normal range of motion. He exhibits no edema. Neurological: He is alert. Skin: Skin is warm. Psychiatric: Affect normal.   Nursing note and vitals reviewed. Intake and Output:  Current Shift:  No intake/output data recorded. Last three shifts:  10/18 1901 - 10/20 0700  In: 895.5 [P.O.:598; I.V.:297.5]  Out: 2100 [Urine:2100]    Lab/Data Reviewed:  Recent Results (from the past 8 hour(s))   GLUCOSE, POC    Collection Time: 10/20/19  6:47 AM   Result Value Ref Range    Glucose (POC) 167 (H) 70 - 110 mg/dL   CBC WITH AUTOMATED DIFF    Collection Time: 10/20/19  7:12 AM   Result Value Ref Range    WBC 8.1 4.6 - 13.2 K/uL    RBC 2.88 (L) 4.70 - 5.50 M/uL    HGB 8.0 (L) 13.0 - 16.0 g/dL    HCT 25.7 (L) 36.0 - 48.0 %    MCV 89.2 74.0 - 97.0 FL    MCH 27.8 24.0 - 34.0 PG    MCHC 31.1 31.0 - 37.0 g/dL    RDW 15.1 (H) 11.6 - 14.5 %    PLATELET 995 (L) 142 - 420 K/uL    MPV 11.5 9.2 - 11.8 FL    NEUTROPHILS 70 40 - 73 %    LYMPHOCYTES 16 (L) 21 - 52 %    MONOCYTES 9 3 - 10 %    EOSINOPHILS 5 0 - 5 %    BASOPHILS 0 0 - 2 %    ABS. NEUTROPHILS 5.7 1.8 - 8.0 K/UL    ABS. LYMPHOCYTES 1.3 0.9 - 3.6 K/UL    ABS. MONOCYTES 0.7 0.05 - 1.2 K/UL    ABS. EOSINOPHILS 0.4 0.0 - 0.4 K/UL    ABS.  BASOPHILS 0.0 0.0 - 0.1 K/UL    DF AUTOMATED     METABOLIC PANEL, BASIC    Collection Time: 10/20/19  7:12 AM   Result Value Ref Range    Sodium 143 136 - 145 mmol/L    Potassium 3.4 (L) 3.5 - 5.5 mmol/L    Chloride 105 100 - 111 mmol/L    CO2 32 21 - 32 mmol/L    Anion gap 6 3.0 - 18 mmol/L    Glucose 163 (H) 74 - 99 mg/dL    BUN 55 (H) 7.0 - 18 MG/DL    Creatinine 2.87 (H) 0.6 - 1.3 MG/DL    BUN/Creatinine ratio 19 12 - 20      GFR est AA 26 (L) >60 ml/min/1.73m2    GFR est non-AA 21 (L) >60 ml/min/1.73m2    Calcium 8.8 8.5 - 10.1 MG/DL     Medications:  Current Facility-Administered Medications   Medication Dose Route Frequency    amoxicillin-clavulanate (AUGMENTIN) 875-125 mg per tablet 1 Tab  1 Tab Oral Q12H    furosemide (LASIX) injection 40 mg  40 mg IntraVENous DAILY    insulin lispro (HUMALOG) injection   SubCUTAneous AC&HS    dextrose 5% infusion  25 mL/hr IntraVENous CONTINUOUS    perflutren lipid microspheres (DEFINITY) in NS bolus IV  1 mL IntraVENous PRN    nystatin (MYCOSTATIN) 100,000 unit/mL oral suspension 500,000 Units  500,000 Units Oral QID    QUEtiapine (SEROquel) tablet 25 mg  25 mg Oral QHS    metoprolol succinate (TOPROL-XL) XL tablet 25 mg  25 mg Oral DAILY    alfuzosin SR (UROXATRAL) tablet 10 mg  10 mg Oral DAILY    atorvastatin (LIPITOR) tablet 40 mg  40 mg Oral DAILY    isosorbide mononitrate ER (IMDUR) tablet 60 mg  60 mg Oral DAILY    apixaban (ELIQUIS) tablet 2.5 mg  2.5 mg Oral BID    albuterol-ipratropium (DUO-NEB) 2.5 MG-0.5 MG/3 ML  3 mL Nebulization Q6H PRN    sodium chloride (NS) flush 5-10 mL  5-10 mL IntraVENous PRN    sodium chloride (NS) flush 5-40 mL  5-40 mL IntraVENous Q8H    sodium chloride (NS) flush 5-40 mL  5-40 mL IntraVENous PRN    pantoprazole (PROTONIX) 40 mg in sodium chloride 0.9% 10 mL injection  40 mg IntraVENous DAILY    glucose chewable tablet 16 g  16 g Oral PRN    glucagon (GLUCAGEN) injection 1 mg  1 mg IntraMUSCular PRN    dextrose 10% infusion 125-250 mL  125-250 mL IntraVENous PRN    hydrALAZINE (APRESOLINE) 20 mg/mL injection 10 mg  10 mg IntraVENous Q6H PRN       Recent Results (from the past 24 hour(s))   GLUCOSE, POC    Collection Time: 10/19/19 12:18 PM   Result Value Ref Range    Glucose (POC) 272 (H) 70 - 110 mg/dL   GLUCOSE, POC    Collection Time: 10/19/19  5:09 PM   Result Value Ref Range    Glucose (POC) 211 (H) 70 - 110 mg/dL   GLUCOSE, POC    Collection Time: 10/19/19 11:31 PM   Result Value Ref Range    Glucose (POC) 224 (H) 70 - 110 mg/dL   GLUCOSE, POC    Collection Time: 10/20/19  6:47 AM   Result Value Ref Range    Glucose (POC) 167 (H) 70 - 110 mg/dL   CBC WITH AUTOMATED DIFF    Collection Time: 10/20/19  7:12 AM   Result Value Ref Range    WBC 8.1 4.6 - 13.2 K/uL    RBC 2.88 (L) 4.70 - 5.50 M/uL    HGB 8.0 (L) 13.0 - 16.0 g/dL    HCT 25.7 (L) 36.0 - 48.0 %    MCV 89.2 74.0 - 97.0 FL    MCH 27.8 24.0 - 34.0 PG    MCHC 31.1 31.0 - 37.0 g/dL    RDW 15.1 (H) 11.6 - 14.5 %    PLATELET 437 (L) 962 - 420 K/uL    MPV 11.5 9.2 - 11.8 FL    NEUTROPHILS 70 40 - 73 %    LYMPHOCYTES 16 (L) 21 - 52 %    MONOCYTES 9 3 - 10 %    EOSINOPHILS 5 0 - 5 %    BASOPHILS 0 0 - 2 %    ABS. NEUTROPHILS 5.7 1.8 - 8.0 K/UL    ABS. LYMPHOCYTES 1.3 0.9 - 3.6 K/UL    ABS. MONOCYTES 0.7 0.05 - 1.2 K/UL    ABS. EOSINOPHILS 0.4 0.0 - 0.4 K/UL    ABS.  BASOPHILS 0.0 0.0 - 0.1 K/UL    DF AUTOMATED     METABOLIC PANEL, BASIC    Collection Time: 10/20/19  7:12 AM   Result Value Ref Range    Sodium 143 136 - 145 mmol/L    Potassium 3.4 (L) 3.5 - 5.5 mmol/L    Chloride 105 100 - 111 mmol/L    CO2 32 21 - 32 mmol/L    Anion gap 6 3.0 - 18 mmol/L    Glucose 163 (H) 74 - 99 mg/dL    BUN 55 (H) 7.0 - 18 MG/DL    Creatinine 2.87 (H) 0.6 - 1.3 MG/DL    BUN/Creatinine ratio 19 12 - 20      GFR est AA 26 (L) >60 ml/min/1.73m2    GFR est non-AA 21 (L) >60 ml/min/1.73m2    Calcium 8.8 8.5 - 10.1 MG/DL       Procedures/imaging: see electronic medical records for all procedures/Xrays and details which were not copied into this note but were reviewed prior to creation of Reva Blackwood MD   10/20/2019, 4:19 PM

## 2019-10-20 NOTE — PROGRESS NOTES
Skin:     Head to toe assessment completed with Melanie Merchant RN, pt noted to have +2 edema in b/l feet, skin tear on scrotum and mepilex placed. Pt also has mepilex on sternum r/t sternal rub. Pt has LLE abrasion to outer thigh area and no complaints. Will continue to monitor.

## 2019-10-20 NOTE — PROGRESS NOTES
[facility]  Pulmonary, Critical Care, and Sleep Medicine    Name: Danika Landaverde MRN: [id number]   : 1935 Hospital: [facility] [place]   Date: 10/20/2019        Pulmonary Critical Care Note    IMPRESSION:   Patient Active Problem List   Diagnosis Code    Acute on chronic hypercapnic hypoxic respiratory failure J96.21, J96.22    Acute on chronic combined systolic and diastolic CHF (congestive heart failure) ([facility]) I50.43    NSTEMI (non-ST elevated myocardial infarction) ([facility] [address] I21.4    Pulmonary infiltrates - pulmonary edema vs pneumonia     UTI (urinary tract infection) N39.0    Leukocytosis     CKD (chronic kidney disease) stage 3, GFR 30-59 ml/min ([facility]) N18.3    Anemia     History of CVA (cerebrovascular accident) Z86.73    Insulin dependent diabetes mellitus with complications ([facility] [address] L44.6, Z79.4    Hypertension I10    Acute encephalopathy, resolved, back to baseline mental status now.  Dementia ([facility] [address] F03.90   Hypoglycemia, due to poor PO intake. Hx of PE, on Eliquis     Full code. RECOMMENDATIONS:   Respi: respi status improved. Not requiring bipap. On NC 2 LPM  Use BiPAP PRN only. Titrate fio2 to keep spo2 >=92%  Aspiration precautions. HOB >30. IS, OOB, PT, OT. ALL OTHER FOLLOWING ISSUES BEING MANAGED BY HOSPITALIST AND RESPECTIVE CONSULTANTS.:    Cardio: BP stable. Echo with newly reduced LVEF 36-40% and global hypokinesis. CHF on cxr, improved with lasix. Trop max 1.76  C/w lasix 40 Qd. cxr improving. Strict I/O. C/w Imdur, toprol xl, lasix, lipitor  Off norvasc. C/w Eliquis for Hx of PE and high risk for VTE with bed bound status. PVL LE negative 10/7/19. Dr. Jeffrey Stein on board. ID:  WBC improved. CXR improving. Likely aspiration pneumonia vs CHF. UA abnormal.   Urine cx negative. Blood cx NGTD  Can't collect sputum cx  D/c'ed vanco (with renal insufficiency). Changed zosyn to augmentin for 5 more days to complete total 7 day course. Waldemar Ramirez C/w nystatin swiss and spit for oral thrush, improving. Endo:   Had hypoglycemia due to poor PO intake. Resolved. On D5 at 25 ml/hr  C/w SSI  Tolerating po diet, per ST recommendations    Neuro: CVA with left hemiparesis, bed bound status at NH.   CT head nil acute. Ammonia normal 22. Mental status at baseline now. Alert awake and talking    Renal: CKD, creat slightly up. Monitor renal function and electrolyte as pt is on lasix. vanco was d/c'ed 10/18  D/c zosyn 10/19    Nutrition: d/w ST, started on pureed and nector (baseline diet was pureed and thin). Aspiration precautions. PT, OT, IS. OOB. Palliative care consulted  Dos Santos Bundle Followed . On dos santos for strict I/O. Remove dos santos as soon as not needed. Will defer respective systems problem management to primary and other consultant   Further recommendations will be based on the patient's response to recommended treatment and results of the investigation ordered. Quality Care: PPI, DVT prophylaxis, HOB elevated, Infection control all reviewed and addressed. PAIN AND SEDATION: none  · Skin/Wound: multiple small skin tears   · Nutrition: NPO  · Prophylaxis: DVT [Eliquis] and GI [PPI] Prophylaxis reviewed. · Restraints: none  · PT/OT eval and treat: as needed   · Lines/Tubes: lines PIV; dos santos 10/16/19  ADVANCE DIRECTIVE: Full code. DISCUSSION: spoke with RN, RT  Events and notes from last 24 hours reviewed. Care plan discussed with nursing     No acute pulmonary issues. PCCM will sign off. Call us with any question. Subjective/History:   Mr. Griffin Lozano has been seen and evaluated as Dr. Jazmin Navarro requested now for assisting with Acute on chronic hypercapnic hypoxic respiratory failure on BiPAP. The patient can not provide additional history due to dementia, unable to comprehend. Patient is a 81 y.o. male with following PMHx presented to ER from local NH after found with progressive respiratory distress and obtundation.  EMS brought patient on NRB. In ER, ABG showed mild acute on chronic hypercapnia started on BiPAP. CXR showed some pulmonary edema like changes. Pro-BNP and troponin were elevated. He was admitted for CHF exacerbation and respiratory failure requiring BiPAP. Other information is limited. 10/20/2019   Transferred out of icu yesterday  Did not require bipap  respi status imprved  No fever chills cough dyspnea wheezing. Wbc normalized  BP stable  Mental status at baseline alert awake and following commands  Left leg and hand swelling improving  No other respiratory issues overnight. Review of Systems:  Review of systems not obtained due to patient factors. Latest lactic acid:   Lactic acid   Date Value Ref Range Status   10/17/2019 0.5 0.4 - 2.0 MMOL/L Final       Past Medical History:  Past Medical History:   Diagnosis Date    CAD (coronary artery disease)     Chronic kidney disease     Dementia ([facility] [address] [facility]     Diabetes ([facility] [address]     Hypertension     Stroke [facility])         Past Surgical History:  History reviewed. No pertinent surgical history. Medications:  Prior to Admission medications    Medication Sig Start Date End Date Taking? Authorizing Provider   brimonidine (ALPHAGAN) 0.2 % ophthalmic solution Administer 1 Drop to both eyes two (2) times a day. 8/10/19   Bettie Pugh MD   isosorbide mononitrate ER (IMDUR) 60 mg CR tablet Take 1 Tab by mouth daily. 8/11/19   Bettie Pugh MD   amoxicillin-clavulanate (AUGMENTIN) 500-125 mg per tablet Take 1 Tab by mouth daily. 8/10/19   Bettie Pugh MD   furosemide (LASIX) 20 mg tablet Take 1 Tab by mouth every other day. 8/10/19   Bettie Pugh MD   acetaminophen (TYLENOL) 650 mg TbER Take 650 mg by mouth every eight (8) hours. Michael Gaam MD   albuterol (PROVENTIL VENTOLIN) 2.5 mg /3 mL (0.083 %) nebu by Nebulization route. Michael Gama MD   alfuzosin SR (UROXATRAL) 10 mg SR tablet Take  by mouth daily.     Michael Gama MD   amLODIPine (NORVASC) 5 mg tablet Take 5 mg by mouth daily. Michael Gama MD   atorvastatin (LIPITOR) 40 mg tablet Take 40 mg by mouth daily. Michael Gama MD   apixaban (ELIQUIS) 2.5 mg tablet Take 2.5 mg by mouth two (2) times a day. Michael Gama MD   hydrALAZINE (APRESOLINE) 50 mg tablet Take 25 mg by mouth three (3) times daily. Michael Gama MD   pregabalin (LYRICA) 75 mg capsule Take 75 mg by mouth. Michael Gama MD   insulin aspart U-100 (NOVOLOG) 100 unit/mL injection by SubCUTAneous route Before breakfast, lunch, dinner and at bedtime. Michael Gama MD   raNITIdine (ZANTAC) 150 mg tablet Take 150 mg by mouth two (2) times a day. Michael Gama MD   montelukast (SINGULAIR) 10 mg tablet Take 10 mg by mouth daily. Michael Gama MD   insulin glargine U-300 conc (TOUJEO SOLOSTAR U-300 INSULIN) 300 unit/mL (1.5 mL) inpn pen by SubCUTAneous route daily.     Michael Gama MD       Current Facility-Administered Medications   Medication Dose Route Frequency    amoxicillin-clavulanate (AUGMENTIN) 875-125 mg per tablet 1 Tab  1 Tab Oral Q12H    furosemide (LASIX) injection 40 mg  40 mg IntraVENous DAILY    insulin lispro (HUMALOG) injection   SubCUTAneous AC&HS    dextrose 5% infusion  25 mL/hr IntraVENous CONTINUOUS    nystatin (MYCOSTATIN) 100,000 unit/mL oral suspension 500,000 Units  500,000 Units Oral QID    QUEtiapine (SEROquel) tablet 25 mg  25 mg Oral QHS    metoprolol succinate (TOPROL-XL) XL tablet 25 mg  25 mg Oral DAILY    alfuzosin SR (UROXATRAL) tablet 10 mg  10 mg Oral DAILY    atorvastatin (LIPITOR) tablet 40 mg  40 mg Oral DAILY    isosorbide mononitrate ER (IMDUR) tablet 60 mg  60 mg Oral DAILY    apixaban (ELIQUIS) tablet 2.5 mg  2.5 mg Oral BID    sodium chloride (NS) flush 5-40 mL  5-40 mL IntraVENous Q8H    pantoprazole (PROTONIX) 40 mg in sodium chloride 0.9% 10 mL injection  40 mg IntraVENous DAILY       Allergy:  Allergies   Allergen Reactions    [place] Juice Unknown (comments)    Pork Derived (Porcine) Unknown (comments)    Tomato Unknown (comments)     Pt unable to answer, dementia          Social History:  Social History     Tobacco Use    Smoking status: Unknown If Ever Smoked   Substance Use Topics    Alcohol use: Not Currently    Drug use: Not on file        Family History:  History reviewed. No pertinent family history. Objective:   Vital Signs:    Blood pressure 155/81, pulse 87, temperature 98.5 °F (36.9 °C), resp. rate 18, height 5' 8\" (1.727 m), weight 97.4 kg (214 lb 12.8 oz), SpO2 100 %. Body mass index is 32.66 kg/m². O2 Device: Nasal cannula   O2 Flow Rate (L/min): 1.5 l/min   Temp (24hrs), Av.5 °F (36.9 °C), Min:97.6 °F (36.4 °C), Max:99 °F (37.2 °C)         Intake/Output:   Last shift:      No intake/output data recorded. Last 3 shifts: 10/18 1901 - 10/20 0700  In: 895.5 [P.O.:598; I.V.:297.5]  Out: 2100 [Urine:2100]    Intake/Output Summary (Last 24 hours) at 10/20/2019 1357  Last data filed at 10/19/2019 1809  Gross per 24 hour   Intake 118 ml   Output 300 ml   Net -182 ml       Physical Exam:  General: alert awake and following commands. NAD. HEENT: PERRL  Neck: No abnormally enlarged lymph nodes or thyroid, supple  Chest: normal  Lungs: moderate air entry, no rhonchi. No wheezing. breathing normal , normal percussion bilaterally, no tenderness/ rash  Heart: Regular rate and rhythm, S1S2 present or without murmur or extra heart sounds  Abdomen: non distended, bowel sounds normoactive, tympanic, abdomen is soft without significant tenderness, masses, organomegaly or guarding, rigidity, rebound  Extremity: trace to 1+pitting on L leg and hand, improving. No edema on right. Capillary refill: normal  Neuro: responds to voice, moves RUE and RLE extremities, weak on L side, no involuntary movements, exam limitations  Skin: Skin color, texture, turgor fair.  Skin dry, warm, non-diaphoretic    Data:     Recent Results (from the past 24 hour(s))   GLUCOSE, POC    Collection Time: 10/19/19  5:09 PM   Result Value Ref Range    Glucose (POC) 211 (H) 70 - 110 mg/dL   GLUCOSE, POC    Collection Time: 10/19/19 11:31 PM   Result Value Ref Range    Glucose (POC) 224 (H) 70 - 110 mg/dL   GLUCOSE, POC    Collection Time: 10/20/19  6:47 AM   Result Value Ref Range    Glucose (POC) 167 (H) 70 - 110 mg/dL   CBC WITH AUTOMATED DIFF    Collection Time: 10/20/19  7:12 AM   Result Value Ref Range    WBC 8.1 4.6 - 13.2 K/uL    RBC 2.88 (L) 4.70 - 5.50 M/uL    HGB 8.0 (L) 13.0 - 16.0 g/dL    HCT 25.7 (L) 36.0 - 48.0 %    MCV 89.2 74.0 - 97.0 FL    MCH 27.8 24.0 - 34.0 PG    MCHC 31.1 31.0 - 37.0 g/dL    RDW 15.1 (H) 11.6 - 14.5 %    PLATELET 479 (L) 658 - 420 K/uL    MPV 11.5 9.2 - 11.8 FL    NEUTROPHILS 70 40 - 73 %    LYMPHOCYTES 16 (L) 21 - 52 %    MONOCYTES 9 3 - 10 %    EOSINOPHILS 5 0 - 5 %    BASOPHILS 0 0 - 2 %    ABS. NEUTROPHILS 5.7 1.8 - 8.0 K/UL    ABS. LYMPHOCYTES 1.3 0.9 - 3.6 K/UL    ABS. MONOCYTES 0.7 0.05 - 1.2 K/UL    ABS. EOSINOPHILS 0.4 0.0 - 0.4 K/UL    ABS. BASOPHILS 0.0 0.0 - 0.1 K/UL    DF AUTOMATED     METABOLIC PANEL, BASIC    Collection Time: 10/20/19  7:12 AM   Result Value Ref Range    Sodium 143 136 - 145 mmol/L    Potassium 3.4 (L) 3.5 - 5.5 mmol/L    Chloride 105 100 - 111 mmol/L    CO2 32 21 - 32 mmol/L    Anion gap 6 3.0 - 18 mmol/L    Glucose 163 (H) 74 - 99 mg/dL    BUN 55 (H) 7.0 - 18 MG/DL    Creatinine 2.87 (H) 0.6 - 1.3 MG/DL    BUN/Creatinine ratio 19 12 - 20      GFR est AA 26 (L) >60 ml/min/1.73m2    GFR est non-AA 21 (L) >60 ml/min/1.73m2    Calcium 8.8 8.5 - 10.1 MG/DL   GLUCOSE, POC    Collection Time: 10/20/19  1:13 PM   Result Value Ref Range    Glucose (POC) 211 (H) 70 - 110 mg/dL           No results for input(s): FIO2I, IFO2, HCO3I, IHCO3, HCOPOC, PCO2I, PCOPOC, IPHI, PHI, PHPOC, PO2I, PO2POC in the last 72 hours.     No lab exists for component: IPOC2    All Micro Results     Procedure Component Value Units Date/Time    CULTURE, RESPIRATORY/SPUTUM/BRONCH W Blaire Broussard [[id number]]  (Abnormal) Collected:  10/17/19 1010    Order Status:  Completed Specimen:  Sputum Updated:  10/20/19 1153     Special Requests: NO SPECIAL REQUESTS        GRAM STAIN >25 WBC/lpf         <10 EPI/lpf         MUCUS PRESENT         NO ORGANISMS SEEN        Culture result: RARE YEAST       CULTURE, BLOOD [[id number]] Collected:  10/16/19 2130    Order Status:  Completed Specimen:  Blood Updated:  10/20/19 0757     Special Requests: NO SPECIAL REQUESTS        Culture result: NO GROWTH 4 DAYS       CULTURE, BLOOD [[id number]] Collected:  10/16/19 2125    Order Status:  Completed Specimen:  Blood Updated:  10/20/19 0757     Special Requests: NO SPECIAL REQUESTS        Culture result: NO GROWTH 4 DAYS       CULTURE, URINE [[id number]] Collected:  10/17/19 1100    Order Status:  Completed Specimen:  Urine from [place] Specimen Updated:  10/19/19 0855     Special Requests: NO SPECIAL REQUESTS        Culture result: NO GROWTH 2 DAYS             Telemetry: normal sinus rhythm    8/7/19 ECHO  · Left Ventricle: Normal cavity size and systolic function (ejection fraction normal). Mild concentric hypertrophy. The estimated ejection fraction is 51 - 55%. Abnormal wall motion as described on the wall scoring diagram below. Unable to assess diastolic function. E/E' ratio is 7.78. Wall Scoring: The following segments are hypokinetic: basal inferior, basal inferolateral, mid inferior and apical inferior. All other segments are normal.  · Left Atrium: Mildly dilated left atrium. · Tricuspid Valve: Tricuspid valve not well visualized. No stenosis. Tricuspid regurgitation is inadequate for estimation of right ventricular systolic pressure. There is no evidence of pulmonary hypertension    Imaging:  [x]I have personally reviewed the patients chest radiographs images and report     Results from Hospital Encounter encounter on 10/16/19   XR CHEST PORT    Narrative EXAM: XR CHEST PORT    CLINICAL INDICATION/HISTORY: CHF follow up  -Additional: None    COMPARISON: Several prior exams, most recently 10/16/2019    TECHNIQUE: Frontal view of the chest    _______________    FINDINGS:    HEART AND MEDIASTINUM: Stable cardiac size and mediastinal contours. LUNGS AND PLEURAL SPACES: Improved aeration of the right mid and bilateral lower  lung zones noted. No pneumothorax. Left retrocardiac opacity similar to prior  avel background of mildly underexpanded lungs. Small bilateral pleural  effusions suspected, as previously. BONY THORAX AND SOFT TISSUES: No acute osseous abnormality    _______________      Impression IMPRESSION:      1. Underexpanded lungs and left retrocardiac atelectasis similar to prior with  improved interstitial and alveolar edema. Suspect small bilateral pleural  effusions without significant change. Results from [place] encounter on 10/16/19   CT HEAD WO CONT    Narrative EXAM: CT head    INDICATION: Left-sided weakness    COMPARISON: None. TECHNIQUE: Axial CT imaging of the head was performed without intravenous  contrast. One or more dose reduction techniques were used on this CT: automated  exposure control, adjustment of the mAs and/or kVp according to patient size,  and iterative reconstruction techniques. The specific techniques used on this  CT exam have been documented in the patient's electronic medical record. Digital  Imaging and Communications in Medicine (DICOM) format image data are available  to nonaffiliated external healthcare facilities or entities on a secure, media  free, reciprocally searchable basis with patient authorization for at least a  12-month period after this study. _______________    FINDINGS:    BRAIN AND POSTERIOR FOSSA: There is age-appropriate atrophy. There is no  intracranial hemorrhage, mass effect, or midline shift. There is an old left  cerebellar hemispheric infarct with encephalomalacia.  Is also an old left  occipital lobe infarct with encephalomalacia. Significant small vessel ischemic  disease present. EXTRA-AXIAL SPACES AND MENINGES: There are no abnormal extra-axial fluid  collections. CALVARIUM: Intact. SINUSES: Clear. OTHER: None.    _______________      Impression IMPRESSION:    No acute intracranial abnormalities. Atrophy with small vessel ischemic disease and old infarcts. PVL LE 10/18/19:  · No evidence of deep vein thrombosis in the right lower extremity veins assessed. · No evidence of deep vein thrombosis in the left lower extremity veins assessed. Echo 10/18/19:  · Left Ventricle: Normal cavity size and wall thickness. Moderate systolic dysfunction. The estimated ejection fraction is 36- 40%. Unable to assess diastolic function. E/E' ratio is 8.82. Wall Scoring: The left ventricular wall motion is globally hypokinetic. · Tricuspid Valve: Normal valve structure and no stenosis. Tricuspid regurgitation is inadequate for estimation of right ventricular systolic pressure.       [x]See my orders for details    My assessment, plan of care, findings, medications, side effects etc were discussed with:  [x]nursing []PT/OT    [x]respiratory therapy []   [x]family []Patient       Radhika Miranda MD  10/20/2019

## 2019-10-20 NOTE — PROGRESS NOTES
Pt remains free of falls throughout my shift, resting comfortably in bed. Pt turned Q2 hrs and currently free of sacral ulcers. MANNY education retention, pt alert to self only. Will continue to monitor.

## 2019-10-21 ENCOUNTER — APPOINTMENT (OUTPATIENT)
Dept: GENERAL RADIOLOGY | Age: 84
DRG: 280 | End: 2019-10-21
Attending: FAMILY MEDICINE
Payer: MEDICARE

## 2019-10-21 LAB
ALBUMIN SERPL-MCNC: 2.4 G/DL (ref 3.4–5)
ALBUMIN/GLOB SERPL: 0.6 {RATIO} (ref 0.8–1.7)
ALP SERPL-CCNC: 84 U/L (ref 45–117)
ALT SERPL-CCNC: 14 U/L (ref 16–61)
ANION GAP SERPL CALC-SCNC: 5 MMOL/L (ref 3–18)
ARTERIAL PATENCY WRIST A: YES
AST SERPL-CCNC: 20 U/L (ref 10–38)
BASE EXCESS BLD CALC-SCNC: 9 MMOL/L
BASOPHILS # BLD: 0 K/UL (ref 0–0.1)
BASOPHILS NFR BLD: 0 % (ref 0–2)
BDY SITE: ABNORMAL
BILIRUB SERPL-MCNC: 0.8 MG/DL (ref 0.2–1)
BODY TEMPERATURE: 99.5
BUN SERPL-MCNC: 56 MG/DL (ref 7–18)
BUN/CREAT SERPL: 20 (ref 12–20)
CALCIUM SERPL-MCNC: 8.2 MG/DL (ref 8.5–10.1)
CHLORIDE SERPL-SCNC: 108 MMOL/L (ref 100–111)
CO2 SERPL-SCNC: 33 MMOL/L (ref 21–32)
CREAT SERPL-MCNC: 2.78 MG/DL (ref 0.6–1.3)
DIFFERENTIAL METHOD BLD: ABNORMAL
EOSINOPHIL # BLD: 0.2 K/UL (ref 0–0.4)
EOSINOPHIL NFR BLD: 3 % (ref 0–5)
ERYTHROCYTE [DISTWIDTH] IN BLOOD BY AUTOMATED COUNT: 14.9 % (ref 11.6–14.5)
GAS FLOW.O2 O2 DELIVERY SYS: ABNORMAL L/MIN
GAS FLOW.O2 SETTING OXYMISER: 1.5 L/M
GLOBULIN SER CALC-MCNC: 4.2 G/DL (ref 2–4)
GLUCOSE BLD STRIP.AUTO-MCNC: 173 MG/DL (ref 70–110)
GLUCOSE BLD STRIP.AUTO-MCNC: 186 MG/DL (ref 70–110)
GLUCOSE BLD STRIP.AUTO-MCNC: 208 MG/DL (ref 70–110)
GLUCOSE SERPL-MCNC: 180 MG/DL (ref 74–99)
HCO3 BLD-SCNC: 33.1 MMOL/L (ref 22–26)
HCT VFR BLD AUTO: 24.8 % (ref 36–48)
HGB BLD-MCNC: 7.7 G/DL (ref 13–16)
LYMPHOCYTES # BLD: 1.3 K/UL (ref 0.9–3.6)
LYMPHOCYTES NFR BLD: 18 % (ref 21–52)
MAGNESIUM SERPL-MCNC: 2.2 MG/DL (ref 1.6–2.6)
MCH RBC QN AUTO: 27.6 PG (ref 24–34)
MCHC RBC AUTO-ENTMCNC: 31 G/DL (ref 31–37)
MCV RBC AUTO: 88.9 FL (ref 74–97)
MONOCYTES # BLD: 0.7 K/UL (ref 0.05–1.2)
MONOCYTES NFR BLD: 10 % (ref 3–10)
NEUTS SEG # BLD: 5.1 K/UL (ref 1.8–8)
NEUTS SEG NFR BLD: 69 % (ref 40–73)
O2/TOTAL GAS SETTING VFR VENT: 0.25 %
PCO2 BLD: 47.9 MMHG (ref 35–45)
PH BLD: 7.45 [PH] (ref 7.35–7.45)
PHOSPHATE SERPL-MCNC: 2.4 MG/DL (ref 2.5–4.9)
PLATELET # BLD AUTO: 120 K/UL (ref 135–420)
PMV BLD AUTO: 11 FL (ref 9.2–11.8)
PO2 BLD: 89 MMHG (ref 80–100)
POTASSIUM SERPL-SCNC: 3.3 MMOL/L (ref 3.5–5.5)
PROT SERPL-MCNC: 6.6 G/DL (ref 6.4–8.2)
RBC # BLD AUTO: 2.79 M/UL (ref 4.7–5.5)
SAO2 % BLD: 97 % (ref 92–97)
SERVICE CMNT-IMP: ABNORMAL
SODIUM SERPL-SCNC: 146 MMOL/L (ref 136–145)
SPECIMEN TYPE: ABNORMAL
TOTAL RESP. RATE, ITRR: 30
WBC # BLD AUTO: 7.3 K/UL (ref 4.6–13.2)

## 2019-10-21 PROCEDURE — 71045 X-RAY EXAM CHEST 1 VIEW: CPT

## 2019-10-21 PROCEDURE — 36600 WITHDRAWAL OF ARTERIAL BLOOD: CPT

## 2019-10-21 PROCEDURE — 74011000250 HC RX REV CODE- 250: Performed by: FAMILY MEDICINE

## 2019-10-21 PROCEDURE — 74011636637 HC RX REV CODE- 636/637: Performed by: FAMILY MEDICINE

## 2019-10-21 PROCEDURE — 74011000255 HC RX REV CODE- 255: Performed by: FAMILY MEDICINE

## 2019-10-21 PROCEDURE — 92526 ORAL FUNCTION THERAPY: CPT

## 2019-10-21 PROCEDURE — 74011250637 HC RX REV CODE- 250/637: Performed by: INTERNAL MEDICINE

## 2019-10-21 PROCEDURE — 74230 X-RAY XM SWLNG FUNCJ C+: CPT

## 2019-10-21 PROCEDURE — 74011000250 HC RX REV CODE- 250: Performed by: INTERNAL MEDICINE

## 2019-10-21 PROCEDURE — 82962 GLUCOSE BLOOD TEST: CPT

## 2019-10-21 PROCEDURE — 74011250636 HC RX REV CODE- 250/636: Performed by: FAMILY MEDICINE

## 2019-10-21 PROCEDURE — 94640 AIRWAY INHALATION TREATMENT: CPT

## 2019-10-21 PROCEDURE — 74011250636 HC RX REV CODE- 250/636: Performed by: HOSPITALIST

## 2019-10-21 PROCEDURE — C9113 INJ PANTOPRAZOLE SODIUM, VIA: HCPCS | Performed by: FAMILY MEDICINE

## 2019-10-21 PROCEDURE — 74011000258 HC RX REV CODE- 258: Performed by: HOSPITALIST

## 2019-10-21 PROCEDURE — 36415 COLL VENOUS BLD VENIPUNCTURE: CPT

## 2019-10-21 PROCEDURE — 74011250636 HC RX REV CODE- 250/636: Performed by: INTERNAL MEDICINE

## 2019-10-21 PROCEDURE — 65660000000 HC RM CCU STEPDOWN

## 2019-10-21 PROCEDURE — 80053 COMPREHEN METABOLIC PANEL: CPT

## 2019-10-21 PROCEDURE — 82803 BLOOD GASES ANY COMBINATION: CPT

## 2019-10-21 PROCEDURE — 83735 ASSAY OF MAGNESIUM: CPT

## 2019-10-21 PROCEDURE — 77010033678 HC OXYGEN DAILY

## 2019-10-21 PROCEDURE — 84100 ASSAY OF PHOSPHORUS: CPT

## 2019-10-21 PROCEDURE — 92611 MOTION FLUOROSCOPY/SWALLOW: CPT

## 2019-10-21 PROCEDURE — 85025 COMPLETE CBC W/AUTO DIFF WBC: CPT

## 2019-10-21 RX ORDER — INSULIN LISPRO 100 [IU]/ML
INJECTION, SOLUTION INTRAVENOUS; SUBCUTANEOUS EVERY 6 HOURS
Status: DISCONTINUED | OUTPATIENT
Start: 2019-10-21 | End: 2019-10-28

## 2019-10-21 RX ORDER — FUROSEMIDE 10 MG/ML
40 INJECTION INTRAMUSCULAR; INTRAVENOUS 2 TIMES DAILY
Status: DISCONTINUED | OUTPATIENT
Start: 2019-10-22 | End: 2019-10-22

## 2019-10-21 RX ORDER — FUROSEMIDE 10 MG/ML
40 INJECTION INTRAMUSCULAR; INTRAVENOUS ONCE
Status: DISPENSED | OUTPATIENT
Start: 2019-10-21 | End: 2019-10-22

## 2019-10-21 RX ORDER — POTASSIUM CHLORIDE 7.45 MG/ML
10 INJECTION INTRAVENOUS
Status: COMPLETED | OUTPATIENT
Start: 2019-10-21 | End: 2019-10-21

## 2019-10-21 RX ADMIN — INSULIN LISPRO 6 UNITS: 100 INJECTION, SOLUTION INTRAVENOUS; SUBCUTANEOUS at 12:42

## 2019-10-21 RX ADMIN — PIPERACILLIN SODIUM,TAZOBACTAM SODIUM 2.25 G: 2; .25 INJECTION, POWDER, FOR SOLUTION INTRAVENOUS at 12:51

## 2019-10-21 RX ADMIN — NYSTATIN 500000 UNITS: 100000 SUSPENSION ORAL at 14:39

## 2019-10-21 RX ADMIN — BARIUM SULFATE 700 MG: 700 TABLET ORAL at 11:17

## 2019-10-21 RX ADMIN — INSULIN LISPRO 3 UNITS: 100 INJECTION, SOLUTION INTRAVENOUS; SUBCUTANEOUS at 06:26

## 2019-10-21 RX ADMIN — FUROSEMIDE 40 MG: 10 INJECTION, SOLUTION INTRAMUSCULAR; INTRAVENOUS at 16:54

## 2019-10-21 RX ADMIN — POTASSIUM CHLORIDE 10 MEQ: 10 INJECTION, SOLUTION INTRAVENOUS at 10:21

## 2019-10-21 RX ADMIN — BARIUM SULFATE 10 ML: 400 PASTE ORAL at 11:17

## 2019-10-21 RX ADMIN — Medication 10 ML: at 14:00

## 2019-10-21 RX ADMIN — POTASSIUM CHLORIDE 10 MEQ: 10 INJECTION, SOLUTION INTRAVENOUS at 09:00

## 2019-10-21 RX ADMIN — PANTOPRAZOLE SODIUM 40 MG: 40 INJECTION, POWDER, FOR SOLUTION INTRAVENOUS at 10:21

## 2019-10-21 RX ADMIN — Medication 10 ML: at 23:25

## 2019-10-21 RX ADMIN — INSULIN LISPRO 3 UNITS: 100 INJECTION, SOLUTION INTRAVENOUS; SUBCUTANEOUS at 17:16

## 2019-10-21 RX ADMIN — Medication 10 ML: at 06:26

## 2019-10-21 RX ADMIN — BARIUM SULFATE 15 ML: 400 SUSPENSION ORAL at 11:17

## 2019-10-21 RX ADMIN — IPRATROPIUM BROMIDE AND ALBUTEROL SULFATE 3 ML: .5; 3 SOLUTION RESPIRATORY (INHALATION) at 16:01

## 2019-10-21 RX ADMIN — FUROSEMIDE 40 MG: 10 INJECTION, SOLUTION INTRAMUSCULAR; INTRAVENOUS at 10:21

## 2019-10-21 RX ADMIN — NYSTATIN 500000 UNITS: 100000 SUSPENSION ORAL at 17:16

## 2019-10-21 RX ADMIN — PIPERACILLIN SODIUM,TAZOBACTAM SODIUM 2.25 G: 2; .25 INJECTION, POWDER, FOR SOLUTION INTRAVENOUS at 17:16

## 2019-10-21 NOTE — PROGRESS NOTES
Problem: Dysphagia (Adult)  Goal: *Acute Goals and Plan of Care (Insert Text)  Description  Recommendations:  Diet: Puree/NECTAR thick liquid with NO STRAWS  Meds: As tolerated  Aspiration Precautions  Oral Care TID  Other: Feeding assistance    Goals:  Patient will:  1. Tolerate PO trials with 0 s/s overt distress in 4/5 trials  2. Utilize compensatory swallow strategies/maneuvers (decrease bite/sip, size/rate, alt. liq/sol) with min cues in 4/5 trials  3. Complete an objective swallow study (i.e., MBSS) to assess swallow integrity, r/o aspiration, and determine of safest LRD, min A - goal met 10/21   10/21/2019 1325 by Reba Saleh SLP  Outcome: Progressing Towards Goal    SPEECH PATHOLOGY MODIFIED BARIUM SWALLOW STUDY    Patient: Ethan Duron (81 y.o. male)  Date: 10/21/2019  Primary Diagnosis: CHF exacerbation ([facility]) [I50.9]  Acute respiratory failure ([facility]) [J96.00]  Acute on chronic combined systolic and diastolic CHF (congestive heart failure) ([facility]) [I50.43]  Procedure(s) (LRB):  ESOPHAGOGASTRODUODENOSCOPY (EGD) (N/A) 4 Days Post-Op   Precautions: Aspiration Fall  PLOF: SNF    ASSESSMENT :  Modified barium swallow study completed with deep penetration of nectar-thick liquid via straw. No aspiration or penetration occurred when nectar-thick liquid was presented via cup sip. Puree and 13 mm Ba+ tablet in puree; within normal limits. Pt presents with moderate oropharyngeal dysphagia, as evidenced above. Deficits include decreased oral bolus control/manipulation and decreased hyolaryngeal excursion and closure. Due to advanced age, multiple co-morbidities and poor safety awareness, patient remains at risk for choking and aspiration. At this time, safest for puree, NECTAR thick liquid diet with NO STRAWS. Strict aspiration precautions, feeding assistance. D/w RN, Yasmany Mcknight. Patient is not appropriate for education at this time. SLP will continue to follow to assess diet tolerance and caregiver education. Patient will benefit from skilled intervention to address the above impairments. Patient's rehabilitation potential is considered to be Guarded  Factors which may influence rehabilitation potential include:   ? None noted  ? Mental ability/status  ? Medical condition  ? Home/family situation and support systems  ? Safety awareness  ? Pain tolerance/management  ? Other:      PLAN :  Recommendations and Planned Interventions:  Puree/NTL  Frequency/Duration: Patient will be followed by speech-language pathology 1-2 times per day/4-7 days per week to address goals. Discharge Recommendations: Skilled Nursing Facility     SUBJECTIVE:   Patient stated N/A. OBJECTIVE:     Past Medical History:   Diagnosis Date    CAD (coronary artery disease)     Chronic kidney disease     Dementia ([facility] [address]     Diabetes ([facility] [address]     Hypertension     Stroke [facility])    History reviewed. No pertinent surgical history. Home Situation:   Home Situation  Home Environment: [address] [place] [address] Name: [place]  [facility] Residence: Other (Comment)(unknown)  Living Alone: No  Support Systems: Skilled nursing facility  Patient Expects to be Discharged to[de-identified] Rehabilitation facility  Current DME Used/Available at Home: Compression garments  Diet prior to admission: Puree/thin  Current Diet:  Puree/NTL   Radiologist: HRRA  Film Views: Lateral  Patient Position: Chair 90    Trial 1:   Consistency Presented: Nectar thick liquid; Thin liquid;Puree; Solid   How Presented: SLP-fed/presented;Straw;Successive swallows;Spoon;Cup/sip       Bolus Acceptance: No impairment   Bolus Formation/Control: No impairment: Premature spillage   Propulsion: No impairment   Oral Residue: None   Initiation of Swallow: Triggered at base of tongue   Timing: No impairment   Penetration:  To cords;During swallow   Aspiration/Timing: No evidence of aspiration   Pharyngeal Clearance: No residue   Attempted Modifications: Small sips and bites;Cup/sip   Effective Modifications: Small sips and bites;Cup/sip   Cues for Modifications: Maximal         Decreased Tongue Base Retraction?: No  Laryngeal Elevation: Incomplete laryngeal closure  Aspiration/Penetration Score: 5 (Penetration/Visible residue-Contrast contacts the folds, but is not ejected)   Pharyngeal Symmetry: Not assessed  Pharyngeal-Esophageal Segment: No impairment  Pharyngeal Dysfunction: Decreased elevation/closure    Oral Phase Severity: Moderate  Pharyngeal Phase Severity: Moderate    PAIN:  Pain level pre-treatment: 0/10   Pain level post-treatment: 0/10     COMMUNICATION/EDUCATION:   ?  Patient educated regarding MBS results and diet recommendations. ?  Patient/family have participated as able in goal setting and plan of care. ?  Patient/family agree to work toward stated goals and plan of care. ?  Patient understands intent and goals of therapy, but is neutral about his/her participation. ? Patient is unable to participate in goal setting and plan of care.     Thank you for this referral.  Mercedes Badillo SLP  Time Calculation: 25 mins

## 2019-10-21 NOTE — PROGRESS NOTES
Respiratory note:  Called for now neb tx, pt having difficulty breathing, wheezing noted, strong congested cough, nonproductive at this time.  Mild wheezes persist.

## 2019-10-21 NOTE — PROGRESS NOTES
Speech Therapy Note:    Modified barium swallow study completed with penetration to the cords with NTL via straw. Patient safest for puree, nectar-thick liquid diet with aspiration precautions and the following compensatory strategies: NO STRAWS, small sips, feeding assistance. Full report to follow.      Chelsea He M.S., [id number] [facility]  Speech Language Pathologist

## 2019-10-21 NOTE — PROGRESS NOTES
Problem: Falls - Risk of  Goal: *Absence of Falls  Description  Document Donata Carrel Fall Risk and appropriate interventions in the flowsheet. Outcome: Progressing Towards Goal  Note:   Fall Risk Interventions:  Mobility Interventions: Bed/chair exit alarm, Communicate number of staff needed for ambulation/transfer    Mentation Interventions: Bed/chair exit alarm, Family/sitter at bedside, More frequent rounding, Reorient patient, Update white board, Room close to nurse's station    Medication Interventions: Bed/chair exit alarm    Elimination Interventions: Bed/chair exit alarm, Call light in reach, Toileting schedule/hourly rounds              Problem: Patient Education: Go to Patient Education Activity  Goal: Patient/Family Education  Outcome: Progressing Towards Goal     Problem: Pressure Injury - Risk of  Goal: *Prevention of pressure injury  Description  Document [place] Scale and appropriate interventions in the flowsheet. Outcome: Progressing Towards Goal  Note:   Pressure Injury Interventions:  Sensory Interventions: Assess changes in LOC, Discuss PT/OT consult with provider, Float heels, Keep linens dry and wrinkle-free, Minimize linen layers, Monitor skin under medical devices, Pad between skin to skin, Pressure redistribution bed/mattress (bed type), Turn and reposition approx.  every two hours (pillows and wedges if needed)    Moisture Interventions: Absorbent underpads, Apply protective barrier, creams and emollients, Check for incontinence Q2 hours and as needed, Internal/External urinary devices, Moisture barrier, Minimize layers    Activity Interventions: Pressure redistribution bed/mattress(bed type)    Mobility Interventions: Pressure redistribution bed/mattress (bed type)    Nutrition Interventions: Document food/fluid/supplement intake, Offer support with meals,snacks and hydration    Friction and Shear Interventions: Apply protective barrier, creams and emollients, HOB 30 degrees or less, Minimize layers, Foam dressings/transparent film/skin sealants, Feet elevated on foot rest                Problem: Patient Education: Go to Patient Education Activity  Goal: Patient/Family Education  Outcome: Progressing Towards Goal     Problem: Pain  Goal: *Control of Pain  Outcome: Progressing Towards Goal     Problem: Patient Education: Go to Patient Education Activity  Goal: Patient/Family Education  Outcome: Progressing Towards Goal     Problem: Heart Failure: Day 2  Goal: Activity/Safety  Outcome: Progressing Towards Goal  Goal: Consults, if ordered  Outcome: Progressing Towards Goal  Goal: Diagnostic Test/Procedures  Outcome: Progressing Towards Goal  Goal: Nutrition/Diet  Outcome: Progressing Towards Goal  Goal: Discharge Planning  Outcome: Progressing Towards Goal  Goal: Medications  Outcome: Progressing Towards Goal  Goal: Respiratory  Outcome: Progressing Towards Goal  Goal: Treatments/Interventions/Procedures  Outcome: Progressing Towards Goal  Goal: *Oxygen saturation within defined limits  Outcome: Progressing Towards Goal  Goal: *Hemodynamically stable  Outcome: Progressing Towards Goal  Goal: *Optimal pain control at patient's stated goal  Outcome: Progressing Towards Goal  Goal: *Anxiety reduced or absent  Outcome: Progressing Towards Goal  Goal: *Demonstrates progressive activity  Outcome: Progressing Towards Goal     Problem: Patient Education: Go to Patient Education Activity  Goal: Patient/Family Education  Outcome: Progressing Towards Goal     Problem: Patient Education: Go to Patient Education Activity  Goal: Patient/Family Education  Outcome: Progressing Towards Goal     Problem: Gas Exchange - Impaired  Goal: *Absence of hypoxia  Outcome: Progressing Towards Goal     Problem: Patient Education: Go to Patient Education Activity  Goal: Patient/Family Education  Outcome: Progressing Towards Goal

## 2019-10-21 NOTE — PROGRESS NOTES
800 Assumed care of patient from [address], [facility]    1150 Assessment completed, patient is alert and oriented to self only, but does follow simple commands with left sided weakness. NSR with AVB on the monitor with a HR in the 70's. Lung fields are clear with diminished bases throughout on 1.5L NC, 02 sat sustained at 100%. Patient completed barrium swallow and is able to ingest nectar thick with no straws. Condom cath in place draining eyal dark urine without any complications. Scheduled PO meds held until completion of barium swallow. Patient is currently resting in bed with daughter at bedside, no s/s of any distress, will continue to monitor     1254 Scheduled medications administered as ordered without any complications. Patient is currently resting quietly in bed, no s/s of any pain or distress, will continue to monitor    1423 NAD, will continue to monitor    1443 oral care administered with scheduled nystatin. Patient is resting quietly in bed, no s/s of any pain or distress, will continue to monitor    1604 patient sitting up in bed groaning but denies any respiratory distress or pain. Patient completed modified swallow test today and tolerated without any s/s of any aspiration. Patient began to present respiratory distress hours after being fed lunch. Respiratory notified and asked to come to bedside. Rena Marquez, RT administered PRN neb tx, Dr. Alison Mixon notified, order received for IV lasix 40mg and to make patient NPO until further notice from MD team after speaking with family, orders carried out accordingly. 1707 Lasix 40mg x's 1 administered after several attempts to replace PIV. Patient was changed of stool incont and appear to be more comfortable after the lasix, son at bedside, will continue to monitor    1900 NAD, will continue to monitor    2003 Bedside and Verbal shift change report given to Gibson Ceballos, RN (oncoming nurse) by Roc Sherwood RN (offgoing nurse).  Report included the following information SBAR, Accordion and Cardiac Rhythm nsr with AVB.

## 2019-10-21 NOTE — PROGRESS NOTES
Problem: Dysphagia (Adult)  Goal: *Acute Goals and Plan of Care (Insert Text)  Description  Recommendations:  Diet: NPO until completion of MBS  Meds: As tolerated  Aspiration Precautions  Oral Care TID  Other: Feeding assistance  MBSS this AM    Goals:  Patient will:  1. Tolerate PO trials with 0 s/s overt distress in 4/5 trials  2. Utilize compensatory swallow strategies/maneuvers (decrease bite/sip, size/rate, alt. liq/sol) with min cues in 4/5 trials  3. Perform oral-motor/laryngeal exercises to increase oropharyngeal swallow function with min cues  4. Complete an objective swallow study (i.e., MBSS) to assess swallow integrity, r/o aspiration, and determine of safest LRD, min A    Outcome: Progressing Towards Goal    SPEECH LANGUAGE PATHOLOGY DYSPHAGIA TREATMENT    Patient: Griffin Lozano (34 y.o. male)  Date: 10/21/2019  Diagnosis: CHF exacerbation ([facility]) [I50.9]  Acute respiratory failure ([facility]) [J96.00]  Acute on chronic combined systolic and diastolic CHF (congestive heart failure) ([facility]) [I50.43] Acute respiratory failure ([facility])  Procedure(s) (LRB):  ESOPHAGOGASTRODUODENOSCOPY (EGD) (N/A) 4 Days Post-Op  Precautions: Aspiration Fall  PLOF: Independent     ASSESSMENT:  Followed up with dysphagia intervention. A&O to self. Per chart review patient reported SOB following med pass with pills whole + NTL. Patient accepted SLP-fed NTL +/- straw and puree with 0 overt s/sx of aspiration, however patient remains at high risk for silent aspiration. Recommend patient complete MBSS this AM. Continue aspiration precautions. D/w RNHam Deapilar and pt's daughter. SLP will continue to follow. Progression toward goals:  ?         Improving appropriately and progressing toward goals  ? Improving slowly and progressing toward goals  ?          Not making progress toward goals and plan of care will be adjusted     PLAN:  Recommendations and Planned Interventions:  NPO until MBSS  Patient continues to benefit from skilled intervention to address the above impairments. Continue treatment per established plan of care. Discharge Recommendations:  Skilled Nursing Facility     SUBJECTIVE:   Patient stated April [place]. OBJECTIVE:   Cognitive and Communication Status:  Neurologic State: Drowsy  Orientation Level: Oriented to person, Disoriented to time, Disoriented to situation, Disoriented to place  Cognition: Follows commands  Perception: Appears intact  Perseveration: No perseveration noted  Safety/Judgement: Awareness of environment, Decreased insight into deficits  Dysphagia Treatment:  Oral Assessment:  Oral Assessment  Labial: No impairment  Dentition: Natural  Oral Hygiene: Poor  Lingual: Other (comment)(White coating on tongue)  Velum: No impairment  Mandible: No impairment  P.O. Trials:   Patient Position: [facility]   Vocal quality prior to P.O.: No impairment   Consistency Presented: Thin liquid, Nectar thick liquid, Puree   How Presented: SLP-fed/presented, Cup/sip, Spoon, Straw       Bolus Acceptance: Impaired   Bolus Formation/Control: Impaired   Type of Impairment: Delayed, Mastication, Incomplete   Propulsion: Discoordination, Delayed (# of seconds)   Oral Residue: Greater than 50% of bolus, Pocketing   Initiation of Swallow: Delayed (# of seconds)   Laryngeal Elevation: Functional   Aspiration Signs/Symptoms: Clear throat   Pharyngeal Phase Characteristics: Audible swallow   Effective Modifications: Alternate liquids/solids, Small sips and bites   Cues for Modifications: Maximal         Oral Phase Severity: Moderate   Pharyngeal Phase Severity : Mild    PAIN:  Pain level pre-treatment: 0/10   Pain level post-treatment: 0/10     After treatment:   ?              Patient left in no apparent distress sitting up in chair  ? Patient left in no apparent distress in bed  ? Call bell left within reach  ? Nursing notified  ? Family present  ? Caregiver present  ? Bed alarm activated      COMMUNICATION/EDUCATION:   ? Aspiration precautions; swallow safety; compensatory techniques  ? Patient unable to participate in education; education ongoing with staff  ? Posted safety precautions in patient's room.   ? Oral-motor/laryngeal strengthening exercises      FLAQUITA Lucero  Time Calculation: 10 mins

## 2019-10-21 NOTE — PROGRESS NOTES
Called by RN to assess patient. Patient resting comfortably and does not appear to be in any distress. Patient taking shallow breaths at 30 times per minute. Sats 100% on 1.5L with a HR of 85. Patient does have some end expiratory wheezes on left with a congested cough. Patient NT suctioned for large amount of thick yellowish green secretions.     ABG obtained

## 2019-10-21 NOTE — PROGRESS NOTES
Occupational Therapy Evaluation/Screening:    Occupational Therapy orders received. The patients chart was reviewed and the patient is NOT appropriate for a skilled therapy evaluation. Patient is a LTC resident at [facility] and requires assistance with ADL's including feeding, he is bed bound. Thank you.   Renea Lobo, OTR/L, CSRS

## 2019-10-21 NOTE — PROGRESS NOTES
INITIAL NUTRITION ASSESSMENT     RECOMMENDATIONS/PLAN:   -Coordination of care: glycemic control team to manage blood glucose levels  -Monitor labs/lytes, BM, PO intake, skin integrity, wt, fluid status    REASON FOR ASSESSMENT:   []  MD Consult:    [] General Nutrition Management and Supplements   [] Management of Tube Feeding   [] Calorie Count    [] Diet Education  [x]  RN Referral:    [x] MST score >/=2  Malnutrition Screening Tool (MST):  Recently Lost Weight Without Trying: Unsure  If Yes, How Much Weight Loss: Unsure  Eating Poorly Due to Decreased Appetite: No  MST Score: 4    [] Enteral/Parenteral Nutrition PTA   [] Pregnant (Not in Labor):  [] Gestational DM     [] Multigestation   [] Pressure Ulcer/Wound Care needs  [] Positive Nutrition Screen:  [] BMI <19  [] NPO/Clear Liquid Diet > 5 days (>3 days in ICU)  [] New Order for TPN  [] LOS  [] ICU admission  NUTRITION ASSESSMENT:   Client History: 81 yrs old Male admitted with thrombocytopenia, LVEF 36-40%, per cardiology, acute respiratory failure, NSTEMI, UTI, 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, anemia, weakness     PMHx: CAD, CKD, dementia, DM, HTN, stroke   Cultural/Adventism Food Preferences: None Identified    FOOD/NUTRITION HISTORY  Diet History: unable to obtain, will follow up   Food Allergies:  [] NKFA     [x] Yes (orange juice, prok derived, tomato)   Pertinent PTA Medications: lasix, novolog, insulin glorgine U-300     NUTRITION INTAKE   Diet Order:  Dysphagia   Average PO Intake:       Patient Vitals for the past 100 hrs:   % Diet Eaten   10/20/19 1620 50 %   10/19/19 1700 60 %   10/19/19 1200 25 %   10/19/19 0800 100 %      Pertinent Medications:  [x] Reviewed; D%%, protonix, KCl  Electrolyte Replacement Protocol: []K  []Mg  []PO4    Insulin:  [x] SSI  [] Pre-meal   []  Basal   [] Drip  [] None  Pt expected to meet estimated nutrient needs through next review:          [x]  Yes     [] No;  ANTHROPOMETRICS  Height: 5' 8\" (172.7 cm)       Weight: 98 kg (216 lb)    BMI: 32.9 kg/m^2  -  obese (30%-39.9% BMI)        Weight change: no wt loss                                  Comparison to Reference Standards:  IBW: 154 lbs      %IBW: 140%      AdjBW: 77 kg    NUTRITION-FOCUSED PHYSICAL ASSESSMENT  Skin: no PU    GI: +BM 10/20    BIOCHEMICAL DATA & MEDICAL TESTS  Pertinent Labs:  [x] Reviewed; Na-146, K-3.3, glucose-180, BUN-56, phos-2.3, GFR est AA- 27    NUTRITION PRESCRIPTION  Calories: 0128-0265 kcal/day based on 30-35 kcal/kg AdjBW  Protein:  59-78 g/day based on .6-.8 g/kg  CHO: 289-337 g/day based on 50% of total energy  Fluid: 5447-1154 ml/day based on 1 kcal/ml      NUTRITION DIAGNOSES:   1. At risk of inadequate oral intake related to chewing and swallowing difficulties as evidenced by dyspagia diet. NUTRITION INTERVENTIONS:   INTERVENTIONS:        GOALS:  1. Coordination of care: glycemic control team to manage blood glucose levels 1. Encourage PO intake >75% by next review 7 days     LEARNING NEEDS (Diet, Supplementation, Food/Nutrient-Drug Interaction):   [] None Identified  [] Inpatient education provided/documented    [] Identified and patient:  [] Declined     [x] Was not appropriate/indicated  NUTRITION MONITORING /EVALUATION:   Follow PO intake  Monitor wt  Monitor renal labs, electrolytes, fluid status  Monitor for additional supplement needs    [x] Participated in Interdisciplinary Rounds  [x] [address] [place] [address] Reviewed/Documented  DISCHARGE NUTRITION RECOMMENDATIONS ADDRESSED:        [x] To be determined closer to discharge    NUTRITION RISK:     []  At risk                     [x]  Not currently at risk     Will follow-up per policy.   Wm Ye 1

## 2019-10-21 NOTE — PROGRESS NOTES
Speech Therapy Note:    Follow up attempted. Could not progress with ST intervention because patient:      [x]  Lethargic, unable to be alerted enough for safe PO intake  []  Refused participation  []  Off the unit  []  NPO for procedure  []  Other:     SLP will re-attempt treatment later this day or as schedule permits.     Edel Blanton M.S., [address] [facility]  Speech Language Pathologist

## 2019-10-21 NOTE — PROGRESS NOTES
[facility]: [phone]  [facility]: [phone]   [facility]: [phone]    Patient Name: Bhakti Khanna  YOB: 1935    Date of Initial Consult: 10/18/2019, follow up 10/21/2019    Reason for Consult: care decisions   Requesting Provider: Dr Carter Zhang   Primary Care Physician: Michelle Coello MD      SUMMARY:   Bhakti Khanna is a 81y.o. year old with a past history of dementia, hypertension, CVA, CKD, CAD, diabetes who was admitted on 10/16/2019 from [address] [place] [address] with a diagnosis of acute exacerbation  Current medical issues leading to Palliative Medicine involvement include: 81year old male who is a long term care  nursing home patient with significant co morbidities. Palliative care is consulted for support and goals of care. 10/21/2019 follow up on Mr Dior, for MBS events of last night noted. Daughter at bedside. Clinical overview given. No change in goals of care full code with full aggressive interventions. Has previously shared if he needed a PEG tube, family would be accepting of this. PALLIATIVE DIAGNOSES:   1. Advanced care plan discussion   2. Acute on chronic CHF  3. Dementia   4. Debility        PLAN:   1. 10/21/2019 follow up along with Ms Ning RN. Mr Benavidez Courser not as alert this am, but will alert to answer some very simple questions. His daughter Antoni Son at bedside. Understands he will be going for MBS, due to concern over his ability to swallow safely. She has previously shared with us family would wish to proceed with PEG placement if needed. Mother had PEG placement and they feel she does very well with it. Discussed benefits and burdens of alternative feeding, including it will not stop aspiration. No change in goals of care full code with full interventions. ( please see below for previous conversations)      2.  Advance care plan discussion / goals of care discussion Patient seen at bedside along with Ms Barclay, RN. He is alert and answering some questions, but not able to participate in his medical decisions. His daughter Diane Alvarez and son Idris Boswell are designated MPOA's. AMD on is on file. Shantelle at bedside this am, overall medical condition discussed. Chart reviewed, including Care Everywhere. He has been seen not only by our Palliative team in the past but also palliative colleagues at [facility]. Their notes reviewed family has wished for continuation of full code in the past. Goals of care revisited today with Shantelle. Benefits and burdens of resuscitation carefully reviewed including the interplay of CPR with chronic, debilitating disease. We asked Shantelle to consider medical decisions in particular resuscitation  as it relates to Mr. Farias Net quality of life. Goals of care full code with full interventions. 3. CHF currently maintaining on nasal cannula. Cards and Pulmonology on board  4. Dementia lives at nursing home, knows his daughter's voice and can name her. Requires assistance for ADL's, including feeding. Seen by ST cleared for puree with nectar thick liquids and feeding assistance. Discussed dementia progression s/s with daughter Duane Simper. 5. Debility requires long term care at nursing facility, assistance with ADL's including feeding, he is bed bound   6. Initial consult note routed to primary continuity provider  7.  Communicated plan of care with: Palliative IDT, daughter Tam Brantley Proxy Stated Goals: Prolong life      TREATMENT PREFERENCES:   Code Status: Full Code    Advance Care Planning:  [x] [facility] Interdisciplinary Team has updated the ACP Navigator with [address] and Patient Capacity    Primary Decision [facility] ([address]):   Primary Decision Maker: Marybeth Shone - Son - [phone]    Primary Decision Maker: Paulette Maciel - Daughter - [phone]    Medical Interventions: Full interventions     Artificially Administered Nutrition: Feeding tube long-term, if indicated     Other:  As far as possible, the palliative care team has discussed with patient / health care proxy about goals of care / treatment preferences for patient. HISTORY:     History obtained from: chart and daughter     CHIEF COMPLAINT: respiratory distress    HPI/SUBJECTIVE:    The patient is:   [] Verbal and participatory  [x] Non-participatory due to: confusion   Please see summary     Clinical Pain Assessment (nonverbal scale for nonverbal patients): Clinical Pain Assessment  Severity: 0     Activity (Movement): Lying quietly, normal position    Duration: for how long has pt been experiencing pain (e.g., 2 days, 1 month, years)  Frequency: how often pain is an issue (e.g., several times per day, once every few days, constant)     FUNCTIONAL ASSESSMENT:     Palliative Performance Scale (PPS):  PPS: 30    ECOG  ECOG Status : Completely disabled     PSYCHOSOCIAL/SPIRITUAL SCREENING:      Any spiritual / Voodoo concerns: unable to assess for patient  [] Yes /  [] No    Caregiver Burnout:  [] Yes /  [x] No /  [] No Caregiver Present      Anticipatory grief assessment: unable to assess for patient   [] Normal  / [] Maladaptive        REVIEW OF SYSTEMS:     Positive and pertinent negative findings in ROS are noted above in HPI. The following systems were [] reviewed / [x] unable to be reviewed as noted in HPI  Other findings are noted below. Systems: constitutional, ears/nose/mouth/throat, respiratory, gastrointestinal, genitourinary, musculoskeletal, integumentary, neurologic, psychiatric, endocrine. Positive findings noted below. Modified ESAS Completed by: provider           Pain: 0           Dyspnea: 0           Stool Occurrence(s): 1        PHYSICAL EXAM:     Wt Readings from Last 3 Encounters:   10/21/19 98 kg (216 lb)   08/10/19 107.5 kg (237 lb 1.6 oz)     Blood pressure 142/71, pulse 84, temperature 98.7 °F (37.1 °C), resp.  rate 30, height 5' 8\" (1.727 m), weight 98 kg (216 lb), SpO2 98 %. Pain:  Pain Scale 1: Numeric (0 - 10)  Pain Intensity 1: 0                 Last bowel movement: x 1 yesterday     Constitutional: lying in bed chronically ill appearing in NAD   Respiratory: breathing not labored, currently on nasal cannula   Skin: warm, dry  Neurologic: alerts to his name, follows a few commands            HISTORY:     Principal Problem:    Acute respiratory failure ([facility] (10/16/2019)    Active Problems:    CKD (chronic kidney disease) stage 3, GFR 30-59 ml/min ([facility]) (8/7/2019)      UTI (urinary tract infection) (8/7/2019)      Acute on chronic combined systolic and diastolic CHF (congestive heart failure) ([facility] (10/16/2019)      NSTEMI (non-ST elevated myocardial infarction) ([facility] (10/17/2019)      Past Medical History:   Diagnosis Date    CAD (coronary artery disease)     Chronic kidney disease     Dementia ([facility]     Diabetes ([facility]     Hypertension     Stroke ([facility]       History reviewed. No pertinent surgical history. History reviewed. No pertinent family history. History reviewed, no pertinent family history.   Social History     Tobacco Use    Smoking status: Unknown If Ever Smoked   Substance Use Topics    Alcohol use: Not Currently     Allergies   Allergen Reactions    Brooke Juice Unknown (comments)    Pork Derived (Porcine) Unknown (comments)    Tomato Unknown (comments)     Pt unable to answer, dementia        Current Facility-Administered Medications   Medication Dose Route Frequency    insulin lispro (HUMALOG) injection   SubCUTAneous Q6H    piperacillin-tazobactam (ZOSYN) 2.25 g in 0.9% sodium chloride (MBP/ADV) 50 mL MBP  2.25 g IntraVENous Q6H    furosemide (LASIX) injection 40 mg  40 mg IntraVENous DAILY    dextrose 5% infusion  25 mL/hr IntraVENous CONTINUOUS    perflutren lipid microspheres (DEFINITY) in NS bolus IV  1 mL IntraVENous PRN    nystatin (MYCOSTATIN) 100,000 unit/mL oral suspension 500,000 Units  500,000 Units Oral QID    metoprolol succinate (TOPROL-XL) XL tablet 25 mg  25 mg Oral DAILY    alfuzosin SR (UROXATRAL) tablet 10 mg  10 mg Oral DAILY    atorvastatin (LIPITOR) tablet 40 mg  40 mg Oral DAILY    isosorbide mononitrate ER (IMDUR) tablet 60 mg  60 mg Oral DAILY    apixaban (ELIQUIS) tablet 2.5 mg  2.5 mg Oral BID    albuterol-ipratropium (DUO-NEB) 2.5 MG-0.5 MG/3 ML  3 mL Nebulization Q6H PRN    sodium chloride (NS) flush 5-10 mL  5-10 mL IntraVENous PRN    sodium chloride (NS) flush 5-40 mL  5-40 mL IntraVENous Q8H    sodium chloride (NS) flush 5-40 mL  5-40 mL IntraVENous PRN    pantoprazole (PROTONIX) 40 mg in sodium chloride 0.9% 10 mL injection  40 mg IntraVENous DAILY    glucose chewable tablet 16 g  16 g Oral PRN    glucagon (GLUCAGEN) injection 1 mg  1 mg IntraMUSCular PRN    dextrose 10% infusion 125-250 mL  125-250 mL IntraVENous PRN    hydrALAZINE (APRESOLINE) 20 mg/mL injection 10 mg  10 mg IntraVENous Q6H PRN        LAB AND IMAGING FINDINGS:     Lab Results   Component Value Date/Time    WBC 7.3 10/21/2019 01:25 AM    HGB 7.7 (L) 10/21/2019 01:25 AM    PLATELET 570 (L) 06/50/6261 01:25 AM     Lab Results   Component Value Date/Time    Sodium 146 (H) 10/21/2019 01:25 AM    Potassium 3.3 (L) 10/21/2019 01:25 AM    Chloride 108 10/21/2019 01:25 AM    CO2 33 (H) 10/21/2019 01:25 AM    BUN 56 (H) 10/21/2019 01:25 AM    Creatinine 2.78 (H) 10/21/2019 01:25 AM    Calcium 8.2 (L) 10/21/2019 01:25 AM    Magnesium 2.2 10/21/2019 01:25 AM    Phosphorus 2.4 (L) 10/21/2019 01:25 AM      Lab Results   Component Value Date/Time    AST (SGOT) 20 10/21/2019 01:25 AM    Alk.  phosphatase 84 10/21/2019 01:25 AM    Protein, total 6.6 10/21/2019 01:25 AM    Albumin 2.4 (L) 10/21/2019 01:25 AM    Globulin 4.2 (H) 10/21/2019 01:25 AM     No results found for: INR, PTMR, PTP, PT1, PT2, APTT, INREXT, INREXT   Lab Results   Component Value Date/Time    Iron 32 (L) 10/20/2019 07:12 AM    TIBC 127 (L) 10/20/2019 07:12 AM    Iron % saturation 25 10/20/2019 07:12 AM      No results found for: PH, PCO2, PO2  No components found for: [place]   Lab Results   Component Value Date/Time    CK 98 10/17/2019 03:12 PM    CK - MB 4.9 (H) 10/17/2019 03:12 PM              Total time:25 minutes    Counseling / coordination time, spent as noted above: 20 minutes   > 50% counseling / coordination: yes with daughter Charmaine Atkins    Prolonged service was provided for  []30 min   []75 min in face to face time in the presence of the patient, spent as noted above. Time Start:   Time End:   Note: this can only be billed with 15564 (initial) or 64133 (follow up). If multiple start / stop times, list each separately.

## 2019-10-21 NOTE — PROGRESS NOTES
Problem: Falls - Risk of  Goal: *Absence of Falls  Description  Document [place] Fall Risk and appropriate interventions in the flowsheet. Outcome: Progressing Towards Goal  Note:   Fall Risk Interventions:  Mobility Interventions: Bed/chair exit alarm, Communicate number of staff needed for ambulation/transfer    Mentation Interventions: Bed/chair exit alarm, Family/sitter at bedside, More frequent rounding, Reorient patient, Update white board, Room close to nurse's station    Medication Interventions: Bed/chair exit alarm    Elimination Interventions: Bed/chair exit alarm, Call light in reach, Toileting schedule/hourly rounds              Problem: Patient Education: Go to Patient Education Activity  Goal: Patient/Family Education  Outcome: Progressing Towards Goal     Problem: Pressure Injury - Risk of  Goal: *Prevention of pressure injury  Description  Document [place] Scale and appropriate interventions in the flowsheet. Outcome: Progressing Towards Goal  Note:   Pressure Injury Interventions:  Sensory Interventions: Assess changes in LOC, Discuss PT/OT consult with provider, Float heels, Keep linens dry and wrinkle-free, Minimize linen layers, Monitor skin under medical devices, Pad between skin to skin, Pressure redistribution bed/mattress (bed type), Turn and reposition approx.  every two hours (pillows and wedges if needed)    Moisture Interventions: Absorbent underpads, Apply protective barrier, creams and emollients, Check for incontinence Q2 hours and as needed, Internal/External urinary devices, Moisture barrier, Minimize layers    Activity Interventions: Pressure redistribution bed/mattress(bed type)    Mobility Interventions: Pressure redistribution bed/mattress (bed type)    Nutrition Interventions: Document food/fluid/supplement intake, Offer support with meals,snacks and hydration    Friction and Shear Interventions: Apply protective barrier, creams and emollients, HOB 30 degrees or less, Minimize layers, Foam dressings/transparent film/skin sealants, Feet elevated on foot rest                Problem: Patient Education: Go to Patient Education Activity  Goal: Patient/Family Education  Outcome: Progressing Towards Goal     Problem: Pain  Goal: *Control of Pain  Outcome: Progressing Towards Goal     Problem: Patient Education: Go to Patient Education Activity  Goal: Patient/Family Education  Outcome: Progressing Towards Goal     Problem: Heart Failure: Day 2  Goal: Activity/Safety  Outcome: Progressing Towards Goal  Goal: Consults, if ordered  Outcome: Progressing Towards Goal  Goal: Diagnostic Test/Procedures  Outcome: Progressing Towards Goal  Goal: Nutrition/Diet  Outcome: Progressing Towards Goal  Goal: Discharge Planning  Outcome: Progressing Towards Goal  Goal: Medications  Outcome: Progressing Towards Goal  Goal: Respiratory  Outcome: Progressing Towards Goal  Goal: Treatments/Interventions/Procedures  Outcome: Progressing Towards Goal  Goal: *Oxygen saturation within defined limits  Outcome: Progressing Towards Goal  Goal: *Hemodynamically stable  Outcome: Progressing Towards Goal  Goal: *Optimal pain control at patient's stated goal  Outcome: Progressing Towards Goal  Goal: *Anxiety reduced or absent  Outcome: Progressing Towards Goal  Goal: *Demonstrates progressive activity  Outcome: Progressing Towards Goal     Problem: Patient Education: Go to Patient Education Activity  Goal: Patient/Family Education  10/21/2019 0246 by Benji Johnson  Outcome: Progressing Towards Goal  10/21/2019 0243 by Benji Johnson  Outcome: Progressing Towards Goal     Problem: Patient Education: Go to Patient Education Activity  Goal: Patient/Family Education  Outcome: Progressing Towards Goal     Problem: Gas Exchange - Impaired  Goal: *Absence of hypoxia  Outcome: Progressing Towards Goal     Problem: Patient Education: Go to Patient Education Activity  Goal: Patient/Family Education  Outcome: Progressing Towards Goal     Problem: Dysphagia (Adult)  Goal: *Speech Goal: (INSERT TEXT)  Outcome: Progressing Towards Goal     Problem: Patient Education: Go to Patient Education Activity  Goal: Patient/Family Education  Outcome: Progressing Towards Goal     Problem: Aspiration - Risk of  Goal: *Absence of aspiration  Outcome: Progressing Towards Goal     Problem: Patient Education: Go to Patient Education Activity  Goal: Patient/Family Education  Outcome: Progressing Towards Goal

## 2019-10-21 NOTE — DIABETES MGMT
GLYCEMIC CONTROL PROGRESS NOTE:    - known h/o T2DM, HbA1C within recommended range for age + comorbids on basal/SSI SNF regimen  - BG out of target range non-ICU: < 180 mg/dL  - TDD = 18 units - Humalog Very Insulin Resistant Corrective Coverage  - pt NPO pending barium swallow  - pt may benefit from basal/mealtime insulin regimen once diet resumes   *Lantus 5 units daily   *Humalog 4 units qac  - education not appropriate d/t dementia    Recent Glucose Results:   Lab Results   Component Value Date/Time     (H) 10/21/2019 01:25 AM    GLUCPOC 208 (H) 10/21/2019 12:17 PM    GLUCPOC 173 (H) 10/21/2019 06:03 AM    GLUCPOC 323 (H) 10/20/2019 09:08 PM     Artur Encinas MS, RN, CDE  Glycemic Control Team  [phone]  Pager [phone] (M-TH 8:00-4:30P)  *After Hours pager [phone]

## 2019-10-21 NOTE — CDMP QUERY
There is differing documentation regarding the stage of patient's chronic kidney disease: 3 or 4. IIf possible, could you please document in progress notes and d/c summary if you are evaluating and/or treating any of the following: 
 
=> CKD Stage 3 
=> CKD Stage 4  
=> Other Explanation of clinical findings 
=> Clinically Undetermined (no explanation for clinical findings) The medical record reflects the following: 
 
-----> Risk Factors: 81 yr old patient with CKD 
 
-----> Clinical Indicators: * 10-21-19 Hospitalist PN:  \". Fawad Madrid Assessment/Plan:. ... PRAKASH with ckd stage 3/4 -- cr is stable. Fawad Madrid Patient Active Problem List:. Fawad Madrid CKD (chronic kidney disease) stage 3, GFR 30-59 ml/min. Fawad Madrid \" * Labs: Results for Layo Thompson (MRN [id number]) as of 10/21/2019 19:04 
8/9/2019 03:42 GFR est AA: 27 (L) 
 
8/10/2019 03:24 GFR est AA: 27 (L) 
 
10/16/2019 21:25 
GFR est AA: 35 (L) 
 
10/17/2019 03:08 
GFR est AA: 34 (L) 
 
10/18/2019 04:10 
GFR est AA: 28 (L) 
 
10/19/2019 04:00 
GFR est AA: 26 (L) 
 
10/20/2019 07:12 
GFR est AA: 26 (L) 
 
10/21/2019 01:25 GFR est AA: 27 (L) 
 
-----> Treatment: labs; monitoring Thank you for your time,  
Re Valdez RN Clinical Documentation Improvement [phone]

## 2019-10-21 NOTE — ROUTINE PROCESS
Assume care of patient from [facility]. Patient received in bed awake. Patient is alert and oriented to self only, denies pain and discomfort. Pt on 1.5 L via NC. No distress noted. Frequently use items within reach. Bed locked in low position. Call bell within reach. 2220- At bedside, scheduled medications given with orange juice nectar thicken liquid. HOB elevated, aspiration precaution interventions implemented. After administration, pt became SOB and stated, \"I cant breathe. \" Vitals taken, RR rate 32, shallow, SaO2 at 98%. On auscultation noted exp. Wheezing in left lobe, diminished bilaterally. Patient given oral yanuker for suction. Pt spit out tan colored thick sputum. RT called for duo-neb tx, stated they will come and see patient. Patient awake and alert, noted MEWS of 3 sent page for MD, will make NPO and place SLP consult. 2245- Called and spoke with Dr. Kasandra Trujillo. Informed of pt's status. Stated okay to make pt NPO and place order for SLP reevaluation. RBV.  
 
2258- RT at bedside for breathing tx. Asked pt how does he feel, pt stated, \"I'm okay. \" Will continue to monitor. 0020- Noted pt's RR still in high 20's low 30's after tx. Sent page for MD.  
 
Concepcion Lavdorcas of 3 due to RR at 28. Spoke w/ MD and informed of pt's status. At bedside, asking pt how does he feel after breathing tx and if he still feels like he cant breathe, pt stated, \"No, I feel fine. \" Pt continues to be tachypneic. MD stated to order Bipap, ABG's, and deep suction, orders placed. RBV.    
 
5878- RT and this nurse at bedside. Pt continues to rest calmly, RR in 30's, saO2 at 100% on 1.5L. RT deep suctioned pt, pt expectorated a mouth full of thick brown colored sputum. SaO2 remained above 95%. Pt continues to state that he feels fine. RT obtained ABG's, results are normal. Unsure if RR was documented accurately from previous shifts. Sent page to inform MD of pt's status. 18Octavio Mendosa w/ MD, updated to pt's status. Bipap not recommended, ABG's normal. Chest x- ray pending. Pt resting calmly. Will continue to monitor pt.  
 
0357- Noted MEWS of 3, MD made aware. Pt resting calmly in bed, will continue to monitor. 0630- At bedside w/ patient. Assisted patient w/ incentive spirometry. Patient needs encouragement on usage, tolerated poorly. Patient educated on indications, teach back method used. Will continue to monitor. 0720- Bedside and Verbal shift change report given to Popeye Cool RN (oncoming nurse) by Brunilda Orellana RN (offgoing nurse). Report included the following information SBAR, Kardex, Intake/Output, MAR and Recent Results. In chart to addendum for 10/20/2019.

## 2019-10-21 NOTE — PROGRESS NOTES
Hospitalist Progress Note    Patient: Yazan Yusuf MRN: [id number]  CSN: [id number]    YOB: 1935  Age: 81 y.o. Sex: male    DOA: 10/16/2019 LOS:  LOS: 5 days          Chief Complaint: Ac resp failure      Assessment/Plan   80 yo CVA and dementia patient admitted to ICU for ac resp failure from nursing home  Improved and weaned from bipap  But started with worsening dysphagia yesterday so made NPO and awaiting ability to work with speech therapy this am-drowsy    Acute resp failure -- improved-need swallow ability for pills    Dysphagia-speech to eval, NPO in interim, high risk for aspiration, start empiric zosyn as cannot take PO augmentin    Advanced dementia-full code at familys request     PRAKASH with ckd stage 3/4 -- cr is stable.  Cont to follow daily    hypokalemia-added 2 runs IV Kcl     UTI - cover with zosyn for now     Anemia due to CKD -- h/h stable at 7.7, but monitor closely     NSTEMI - stable cardiac monitor/tele     Weakness -- Pt/OT as tolerated    Discussed with daughter  cxr this am shows edema possible infiltrate  Continue daily lasix and IV antibiotics    Prognosis is poor  If he continues to have dysphagia will need to see if they want to pursue tube feeding, of course this would encur similar risks but at this juncture they are not deescalating goals of care for patient       Disposition :  Patient Active Problem List   Diagnosis Code    Acute exacerbation of CHF (congestive heart failure) ([facility]) I50.9    CKD (chronic kidney disease) stage 3, GFR 30-59 ml/min ([facility]) N18.3    Dementia ([facility] [address] F03.90    History of CVA (cerebrovascular accident) Z86.73    Insulin dependent diabetes mellitus with complications ([address] I24.2, Z79.4    UTI (urinary tract infection) N39.0    Acute respiratory failure ([address] J96.00    Acute on chronic combined systolic and diastolic CHF (congestive heart failure) ([facility]) I50.43    NSTEMI (non-ST elevated myocardial infarction) ([address] I21.4 Subjective:  Trouble swallowing  Was coughing up stuff and food per nurses notes  They had to suction his mouth  NPO status  dtr at bedsdie  He is sleeping      Review of systems:  UTO as drowsy      Vital signs/Intake and Output:  Visit Vitals  /62 (BP 1 Location: Right arm, BP Patient Position: At rest;Supine)   Pulse 79   Temp 99 °F (37.2 °C)   Resp 28   Ht 5' 8\" (1.727 m)   Wt 98 kg (216 lb)   SpO2 100%   BMI 32.84 kg/m²     Current Shift:  No intake/output data recorded. Last three shifts:  10/19 1901 - 10/21 0700  In: 300 [P.O.:300]  Out: 120 [Urine:120]    Exam:    General: elderly obese BM, NAD  Head/Neck: NCAT, supple, No masses, No lymphadenopathy  CVS:Regular rate and rhythm, no M/R/G, S1/S2 heard, no thrill  Lungs:Clear to auscultation bilaterally, no wheezes, rhonchi, or rales  Abdomen: Soft, Nontender, No distention, Normal Bowel sounds, No hepatomegaly  Extremities: 1 plus edema LE BL  Neuro:somnolent                Labs: Results:       Chemistry Recent Labs     10/21/19  0125 10/20/19  0712 10/19/19  0400   * 163* 105*   * 143 143   K 3.3* 3.4* 3.5    105 104   CO2 33* 32 32   BUN 56* 55* 54*   CREA 2.78* 2.87* 2.88*   CA 8.2* 8.8 8.3*   AGAP 5 6 7   BUCR 20 19 19   AP 84  --   --    TP 6.6  --   --    ALB 2.4*  --   --    GLOB 4.2*  --   --    AGRAT 0.6*  --   --       CBC w/Diff Recent Labs     10/21/19  0125 10/20/19  0712 10/19/19  0400   WBC 7.3 8.1 9.2   RBC 2.79* 2.88* 3.00*   HGB 7.7* 8.0* 8.1*   HCT 24.8* 25.7* 26.4*   * 126* 124*   GRANS 69 70 76*   LYMPH 18* 16* 11*   EOS 3 5 2      Cardiac Enzymes No results for input(s): CPK, CKND1, ISIDORO in the last 72 hours. No lab exists for component: CKRMB, TROIP   Coagulation No results for input(s): PTP, INR, APTT, INREXT in the last 72 hours.     Lipid Panel No results found for: CHOL, CHOLPOCT, CHOLX, CHLST, CHOLV, 509263, HDL, HDLP, LDL, LDLC, DLDLP, 433551, VLDLC, VLDL, TGLX, TRIGL, TRIGP, TGLPOCT, CHHD, CHHDX   BNP No results for input(s): BNPP in the last 72 hours.    Liver Enzymes Recent Labs     10/21/19  0125   TP 6.6   ALB 2.4*   AP 84   SGOT 20      Thyroid Studies Lab Results   Component Value Date/Time    TSH 0.45 10/18/2019 04:10 AM        Procedures/imaging: see electronic medical records for all procedures/Xrays and details which were not copied into this note but were reviewed prior to creation of Katelyn Rosa MD

## 2019-10-21 NOTE — PROGRESS NOTES
Pharmacy Dosing Services: Zosyn    The following medication: Zosyn 3.75 gm IV q8h was automatically dose-adjusted  to 2.25 gm IV q6h per [facility] P&T Committee Protocol, with respect to renal function. Consult provided for this   81 y.o. , male , for the indication of CAP. Pt Weight:   Wt Readings from Last 1 Encounters:   10/21/19 98 kg (216 lb)         Serum Creatinine Lab Results   Component Value Date/Time    Creatinine 2.78 (H) 10/21/2019 01:25 AM         Creatinine Clearance Estimated Creatinine Clearance: 23.2 mL/min (A) (based on SCr of 2.78 mg/dL (H)). BUN Lab Results   Component Value Date/Time    BUN 56 (H) 10/21/2019 01:25 AM           Pharmacy to continue to monitor patient daily. Will make dosage adjustments based upon changing renal function. Signed Chick Liming.  Contact information: [phone]

## 2019-10-21 NOTE — PROGRESS NOTES
Palliative medicine team members Miguel Germain Np and this writer provided follow up visit. Mr. Tanika Maloney was very sleepy though briefly opened his eyes in response to verbal stimuli. Daughter, Carol Ann Martin, was at bedside. She shared that \"he had a bad night last night with his breathing\". She then stated she had not been able to wake her father this morning and that he was scheduled for another barium swallow this morning. She denied any needs and stated she felt she had good understanding of her father's condition. \"This has happened before\". She stated she would be leaving to go visit her mother while her father was having the study done and would call back later to get the results. In previous discussions regarding PEG tube feedings, Shantelle had shared she would be accepting of a PEG tube. She stated her mother also has dementia and a PEG tube and she feels her mother still has a \"good quality of life\". She is hopeful that her father will return to [place] at discharge as his wife is also there. She also has been consistent with her wishes for Full Code status. PM team remains available for information and support.

## 2019-10-21 NOTE — PROGRESS NOTES
Plan: [facility] for LTC when medically ready. Chart reviewed, spoke with MD regarding medical readiness for discharge. Speech consult ordered. Per Yosef with [facility], pt does not require insurance authorization to return to long term care. Pt will need bipap settings if discharging on bipap.

## 2019-10-21 NOTE — PROGRESS NOTES
Cardiology Progress Note        Patient: Hermes Hein        Sex: male          DOA: 10/16/2019  YOB: 1935      Age:  81 y.o.        LOS:  LOS: 4 days    Patient seen and examined, chart reviewed. Assessment/Plan     Patient Active Problem List   Diagnosis Code    Acute exacerbation of CHF (congestive heart failure) ([facility]) I50.9    CKD (chronic kidney disease) stage 3, GFR 30-59 ml/min ([facility]) N18.3    Dementia ([facility]) F03.90    History of CVA (cerebrovascular accident) Z86.73    Insulin dependent diabetes mellitus with complications ([facility] [address] B61.4, Z79.4    UTI (urinary tract infection) N39.0    Acute respiratory failure ([facility] [address] J96.00    Acute on chronic combined systolic and diastolic CHF (congestive heart failure) ([facility]) I50.43    NSTEMI (non-ST elevated myocardial infarction) ([facility] [address] I21.4      LVEF 36-40%  Thrombocytopenia    Plan:    Continue Metoprolol succinate 25 mg once a day. Continue Nitrates. No aspirin due to risk of bleeding with concomitant anticoagulation therapy. Continue statin. Continue Lasix 40 mg IV once a day  Strict I/O  Monitor renal function  Continue management as per hospital medicine   Due to dementia with CKD will do medical management for NSTEMI. Subjective:    cc:  I am ok       REVIEW OF SYSTEMS:     General: No fevers or chills. Cardiovascular: No chest pain,No palpitations, No orthopnea, No PND, No leg swelling, No claudication  Pulmonary: Positive dyspnea. Gastrointestinal: No nausea, vomiting, bleeding  Neurology: No Dizziness    Objective:      Visit Vitals  /65   Pulse 87   Temp 99.1 °F (37.3 °C)   Resp 14   Ht 5' 8\" (1.727 m)   Wt 97.4 kg (214 lb 12.8 oz)   SpO2 99%   BMI 32.66 kg/m²     Body mass index is 32.66 kg/m². Physical Exam:  General Appearance: Comfortable, not using accessory muscles of respiration. HEENT: DELONTE. HEAD: Atraumatic  NECK: No JVD, no thyroidomeglay.  CAROTIDS: No bruit  LUNGS: Clear bilaterally. HEART: S1+S2 audible, no murmur, no pericardial rub. ABD: Non-tender, BS Audible    EXT: No edema, and no cyanosis. VASCULAR EXAM: Pulses are intact.     CNS: Alert, follows verbal commands  Medication:  Current Facility-Administered Medications   Medication Dose Route Frequency    amoxicillin-clavulanate (AUGMENTIN) 875-125 mg per tablet 1 Tab  1 Tab Oral Q12H    furosemide (LASIX) injection 40 mg  40 mg IntraVENous DAILY    insulin lispro (HUMALOG) injection   SubCUTAneous AC&HS    dextrose 5% infusion  25 mL/hr IntraVENous CONTINUOUS    perflutren lipid microspheres (DEFINITY) in NS bolus IV  1 mL IntraVENous PRN    nystatin (MYCOSTATIN) 100,000 unit/mL oral suspension 500,000 Units  500,000 Units Oral QID    QUEtiapine (SEROquel) tablet 25 mg  25 mg Oral QHS    metoprolol succinate (TOPROL-XL) XL tablet 25 mg  25 mg Oral DAILY    alfuzosin SR (UROXATRAL) tablet 10 mg  10 mg Oral DAILY    atorvastatin (LIPITOR) tablet 40 mg  40 mg Oral DAILY    isosorbide mononitrate ER (IMDUR) tablet 60 mg  60 mg Oral DAILY    apixaban (ELIQUIS) tablet 2.5 mg  2.5 mg Oral BID    albuterol-ipratropium (DUO-NEB) 2.5 MG-0.5 MG/3 ML  3 mL Nebulization Q6H PRN    sodium chloride (NS) flush 5-10 mL  5-10 mL IntraVENous PRN    sodium chloride (NS) flush 5-40 mL  5-40 mL IntraVENous Q8H    sodium chloride (NS) flush 5-40 mL  5-40 mL IntraVENous PRN    pantoprazole (PROTONIX) 40 mg in sodium chloride 0.9% 10 mL injection  40 mg IntraVENous DAILY    glucose chewable tablet 16 g  16 g Oral PRN    glucagon (GLUCAGEN) injection 1 mg  1 mg IntraMUSCular PRN    dextrose 10% infusion 125-250 mL  125-250 mL IntraVENous PRN    hydrALAZINE (APRESOLINE) 20 mg/mL injection 10 mg  10 mg IntraVENous Q6H PRN               Lab/Data Reviewed:       Recent Labs     10/20/19  0712 10/19/19  0400 10/18/19  0410   WBC 8.1 9.2 9.5   HGB 8.0* 8.1* 8.6*   HCT 25.7* 26.4* 28.1*   * 124* 126*     Recent Labs 10/20/19  0712 10/19/19  0400 10/18/19  0410    143 143   K 3.4* 3.5 3.8    104 105   CO2 32 32 33*   * 105* 95   BUN 55* 54* 50*   CREA 2.87* 2.88* 2.64*   CA 8.8 8.3* 8.5         Signed By: Marcelino Diaz MD     October 20, 2019

## 2019-10-22 ENCOUNTER — APPOINTMENT (OUTPATIENT)
Dept: NUCLEAR MEDICINE | Age: 84
DRG: 280 | End: 2019-10-22
Attending: INTERNAL MEDICINE
Payer: MEDICARE

## 2019-10-22 ENCOUNTER — APPOINTMENT (OUTPATIENT)
Dept: NON INVASIVE DIAGNOSTICS | Age: 84
DRG: 280 | End: 2019-10-22
Attending: INTERNAL MEDICINE
Payer: MEDICARE

## 2019-10-22 PROBLEM — N18.30 CKD (CHRONIC KIDNEY DISEASE) STAGE 3, GFR 30-59 ML/MIN (HCC): Status: RESOLVED | Noted: 2019-08-07 | Resolved: 2019-10-22

## 2019-10-22 PROBLEM — N18.4 CKD (CHRONIC KIDNEY DISEASE) STAGE 4, GFR 15-29 ML/MIN (HCC): Status: ACTIVE | Noted: 2019-10-22

## 2019-10-22 LAB
ANION GAP SERPL CALC-SCNC: 5 MMOL/L (ref 3–18)
BACTERIA SPEC CULT: ABNORMAL
BACTERIA SPEC CULT: ABNORMAL
BACTERIA SPEC CULT: NORMAL
BACTERIA SPEC CULT: NORMAL
BASOPHILS # BLD: 0 K/UL (ref 0–0.1)
BASOPHILS NFR BLD: 0 % (ref 0–2)
BUN SERPL-MCNC: 50 MG/DL (ref 7–18)
BUN/CREAT SERPL: 18 (ref 12–20)
CALCIUM SERPL-MCNC: 8.7 MG/DL (ref 8.5–10.1)
CHLORIDE SERPL-SCNC: 108 MMOL/L (ref 100–111)
CO2 SERPL-SCNC: 35 MMOL/L (ref 21–32)
CREAT SERPL-MCNC: 2.77 MG/DL (ref 0.6–1.3)
DIFFERENTIAL METHOD BLD: ABNORMAL
EOSINOPHIL # BLD: 0.4 K/UL (ref 0–0.4)
EOSINOPHIL NFR BLD: 5 % (ref 0–5)
ERYTHROCYTE [DISTWIDTH] IN BLOOD BY AUTOMATED COUNT: 15 % (ref 11.6–14.5)
GLUCOSE BLD STRIP.AUTO-MCNC: 133 MG/DL (ref 70–110)
GLUCOSE BLD STRIP.AUTO-MCNC: 160 MG/DL (ref 70–110)
GLUCOSE BLD STRIP.AUTO-MCNC: 187 MG/DL (ref 70–110)
GLUCOSE BLD STRIP.AUTO-MCNC: 194 MG/DL (ref 70–110)
GLUCOSE BLD STRIP.AUTO-MCNC: 244 MG/DL (ref 70–110)
GLUCOSE SERPL-MCNC: 116 MG/DL (ref 74–99)
GRAM STN SPEC: ABNORMAL
HCT VFR BLD AUTO: 27 % (ref 36–48)
HGB BLD-MCNC: 8.2 G/DL (ref 13–16)
LYMPHOCYTES # BLD: 1.4 K/UL (ref 0.9–3.6)
LYMPHOCYTES NFR BLD: 18 % (ref 21–52)
MCH RBC QN AUTO: 27.3 PG (ref 24–34)
MCHC RBC AUTO-ENTMCNC: 30.4 G/DL (ref 31–37)
MCV RBC AUTO: 90 FL (ref 74–97)
MONOCYTES # BLD: 0.7 K/UL (ref 0.05–1.2)
MONOCYTES NFR BLD: 9 % (ref 3–10)
NEUTS SEG # BLD: 5.1 K/UL (ref 1.8–8)
NEUTS SEG NFR BLD: 68 % (ref 40–73)
PLATELET # BLD AUTO: 149 K/UL (ref 135–420)
PMV BLD AUTO: 11.4 FL (ref 9.2–11.8)
POTASSIUM SERPL-SCNC: 3.2 MMOL/L (ref 3.5–5.5)
RBC # BLD AUTO: 3 M/UL (ref 4.7–5.5)
SERVICE CMNT-IMP: ABNORMAL
SERVICE CMNT-IMP: NORMAL
SERVICE CMNT-IMP: NORMAL
SODIUM SERPL-SCNC: 148 MMOL/L (ref 136–145)
WBC # BLD AUTO: 7.6 K/UL (ref 4.6–13.2)

## 2019-10-22 PROCEDURE — 82962 GLUCOSE BLOOD TEST: CPT

## 2019-10-22 PROCEDURE — 74011000250 HC RX REV CODE- 250: Performed by: FAMILY MEDICINE

## 2019-10-22 PROCEDURE — 93017 CV STRESS TEST TRACING ONLY: CPT

## 2019-10-22 PROCEDURE — 77010033678 HC OXYGEN DAILY

## 2019-10-22 PROCEDURE — 80048 BASIC METABOLIC PNL TOTAL CA: CPT

## 2019-10-22 PROCEDURE — 92526 ORAL FUNCTION THERAPY: CPT

## 2019-10-22 PROCEDURE — A9500 TC99M SESTAMIBI: HCPCS

## 2019-10-22 PROCEDURE — 65660000000 HC RM CCU STEPDOWN

## 2019-10-22 PROCEDURE — 74011250636 HC RX REV CODE- 250/636: Performed by: FAMILY MEDICINE

## 2019-10-22 PROCEDURE — 74011250637 HC RX REV CODE- 250/637: Performed by: INTERNAL MEDICINE

## 2019-10-22 PROCEDURE — 74011250636 HC RX REV CODE- 250/636: Performed by: HOSPITALIST

## 2019-10-22 PROCEDURE — 74011000258 HC RX REV CODE- 258: Performed by: HOSPITALIST

## 2019-10-22 PROCEDURE — C9113 INJ PANTOPRAZOLE SODIUM, VIA: HCPCS | Performed by: FAMILY MEDICINE

## 2019-10-22 PROCEDURE — 74011636637 HC RX REV CODE- 636/637: Performed by: FAMILY MEDICINE

## 2019-10-22 PROCEDURE — 85025 COMPLETE CBC W/AUTO DIFF WBC: CPT

## 2019-10-22 PROCEDURE — 36415 COLL VENOUS BLD VENIPUNCTURE: CPT

## 2019-10-22 PROCEDURE — 74011250637 HC RX REV CODE- 250/637: Performed by: FAMILY MEDICINE

## 2019-10-22 RX ORDER — POTASSIUM CHLORIDE 7.45 MG/ML
10 INJECTION INTRAVENOUS
Status: COMPLETED | OUTPATIENT
Start: 2019-10-22 | End: 2019-10-22

## 2019-10-22 RX ORDER — FACIAL-BODY WIPES
10 EACH TOPICAL DAILY PRN
Status: DISCONTINUED | OUTPATIENT
Start: 2019-10-22 | End: 2019-10-30 | Stop reason: HOSPADM

## 2019-10-22 RX ORDER — FUROSEMIDE 10 MG/ML
40 INJECTION INTRAMUSCULAR; INTRAVENOUS DAILY
Status: DISCONTINUED | OUTPATIENT
Start: 2019-10-24 | End: 2019-10-23

## 2019-10-22 RX ORDER — BISACODYL 5 MG
5 TABLET, DELAYED RELEASE (ENTERIC COATED) ORAL DAILY PRN
Status: DISCONTINUED | OUTPATIENT
Start: 2019-10-22 | End: 2019-10-30 | Stop reason: HOSPADM

## 2019-10-22 RX ORDER — POTASSIUM CHLORIDE 7.45 MG/ML
10 INJECTION INTRAVENOUS ONCE
Status: COMPLETED | OUTPATIENT
Start: 2019-10-22 | End: 2019-10-22

## 2019-10-22 RX ORDER — POTASSIUM CHLORIDE 7.45 MG/ML
10 INJECTION INTRAVENOUS
Status: DISPENSED | OUTPATIENT
Start: 2019-10-22 | End: 2019-10-22

## 2019-10-22 RX ADMIN — PIPERACILLIN SODIUM,TAZOBACTAM SODIUM 2.25 G: 2; .25 INJECTION, POWDER, FOR SOLUTION INTRAVENOUS at 00:09

## 2019-10-22 RX ADMIN — INSULIN LISPRO 3 UNITS: 100 INJECTION, SOLUTION INTRAVENOUS; SUBCUTANEOUS at 00:17

## 2019-10-22 RX ADMIN — PIPERACILLIN SODIUM,TAZOBACTAM SODIUM 2.25 G: 2; .25 INJECTION, POWDER, FOR SOLUTION INTRAVENOUS at 12:56

## 2019-10-22 RX ADMIN — NYSTATIN 500000 UNITS: 100000 SUSPENSION ORAL at 20:12

## 2019-10-22 RX ADMIN — Medication 10 ML: at 23:35

## 2019-10-22 RX ADMIN — POTASSIUM CHLORIDE 10 MEQ: 7.46 INJECTION, SOLUTION INTRAVENOUS at 06:09

## 2019-10-22 RX ADMIN — INSULIN LISPRO 3 UNITS: 100 INJECTION, SOLUTION INTRAVENOUS; SUBCUTANEOUS at 12:55

## 2019-10-22 RX ADMIN — INSULIN LISPRO 3 UNITS: 100 INJECTION, SOLUTION INTRAVENOUS; SUBCUTANEOUS at 23:34

## 2019-10-22 RX ADMIN — POTASSIUM CHLORIDE 10 MEQ: 7.46 INJECTION, SOLUTION INTRAVENOUS at 12:00

## 2019-10-22 RX ADMIN — FUROSEMIDE 40 MG: 10 INJECTION, SOLUTION INTRAMUSCULAR; INTRAVENOUS at 22:04

## 2019-10-22 RX ADMIN — FUROSEMIDE 40 MG: 10 INJECTION, SOLUTION INTRAMUSCULAR; INTRAVENOUS at 11:20

## 2019-10-22 RX ADMIN — NYSTATIN 500000 UNITS: 100000 SUSPENSION ORAL at 22:19

## 2019-10-22 RX ADMIN — PANTOPRAZOLE SODIUM 40 MG: 40 INJECTION, POWDER, FOR SOLUTION INTRAVENOUS at 11:20

## 2019-10-22 RX ADMIN — PIPERACILLIN SODIUM,TAZOBACTAM SODIUM 2.25 G: 2; .25 INJECTION, POWDER, FOR SOLUTION INTRAVENOUS at 07:13

## 2019-10-22 RX ADMIN — Medication 10 ML: at 06:12

## 2019-10-22 RX ADMIN — PIPERACILLIN SODIUM,TAZOBACTAM SODIUM 2.25 G: 2; .25 INJECTION, POWDER, FOR SOLUTION INTRAVENOUS at 23:35

## 2019-10-22 RX ADMIN — POTASSIUM CHLORIDE 10 MEQ: 7.46 INJECTION, SOLUTION INTRAVENOUS at 11:53

## 2019-10-22 RX ADMIN — PIPERACILLIN SODIUM,TAZOBACTAM SODIUM 2.25 G: 2; .25 INJECTION, POWDER, FOR SOLUTION INTRAVENOUS at 20:12

## 2019-10-22 RX ADMIN — REGADENOSON 0.4 MG: 0.08 INJECTION, SOLUTION INTRAVENOUS at 10:00

## 2019-10-22 RX ADMIN — INSULIN LISPRO 6 UNITS: 100 INJECTION, SOLUTION INTRAVENOUS; SUBCUTANEOUS at 20:12

## 2019-10-22 NOTE — PROGRESS NOTES
Problem: Dysphagia (Adult)  Goal: *Acute Goals and Plan of Care (Insert Text)  Description  Recommendations:  Diet: Puree/NECTAR thick liquid with NO STRAWS  Meds: As tolerated  Aspiration Precautions  Oral Care TID  Other: Feeding assistance    Goals:  Patient will:  1. Tolerate PO trials with 0 s/s overt distress in 4/5 trials  2. Utilize compensatory swallow strategies/maneuvers (decrease bite/sip, size/rate, alt. liq/sol) with min cues in 4/5 trials  3. Complete an objective swallow study (i.e., MBSS) to assess swallow integrity, r/o aspiration, and determine of safest LRD, min A - goal met 10/21   Outcome: Progressing Towards Goal    SPEECH LANGUAGE PATHOLOGY DYSPHAGIA TREATMENT    Patient: Hakeem Watt (80 y.o. male)  Date: 10/22/2019  Diagnosis: CHF exacerbation ([facility]) [I50.9]  Acute respiratory failure ([facility]) [J96.00]  Acute on chronic combined systolic and diastolic CHF (congestive heart failure) ([facility]) [I50.43] Acute respiratory failure ([facility])  Procedure(s) (LRB):  ESOPHAGOGASTRODUODENOSCOPY (EGD) (N/A) 5 Days Post-Op  Precautions: Aspiration Fall  PLOF: SNF     ASSESSMENT:  Followed up with dysphagia intervention. MBSS completed 10/21 without aspiration, rec: puree/NTL via cup only. This day, A&O to self only. Patient accepted nectar-thick liquid via cup sip and puree trials 2x each. Patient required maxA hand-over-hand to reduce size/rate of liquid intake. 0 overt s/sx of aspiration. Patient refused all further trials. Recommend patient continue puree/NECTAR thick liquid diet with no straws, small sips/bites, feeding assistance, strict aspiration precautions. Patient remains at high risk for aspiration due to limited safety awareness, limited command following and advanced dementia. D/w RN, Arabella Goldman. SLP will continue to follow as indicated. Progression toward goals:  ?         Improving appropriately and progressing toward goals  ? Improving slowly and progressing toward goals  ?          Not making progress toward goals and plan of care will be adjusted     PLAN:  Recommendations and Planned Interventions:  Puree/NTL  Patient continues to benefit from skilled intervention to address the above impairments. Continue treatment per established plan of care. Discharge Recommendations:  Skilled Nursing Facility     SUBJECTIVE:   Patient stated I need to get up. OBJECTIVE:   Cognitive and Communication Status:  Neurologic State: Alert  Orientation Level: Oriented to person  Cognition: Impaired decision making  Perception: Appears intact  Perseveration: Perseverates during conversation  Safety/Judgement: Decreased awareness of environment, Decreased awareness of need for assistance, Decreased awareness of need for safety, Decreased insight into deficits  Dysphagia Treatment:  Oral Assessment:  Oral Assessment  Labial: No impairment  Dentition: Edentulous  Oral Hygiene: Fair  Lingual: Decreased strength  Velum: No impairment  Mandible: No impairment  P.O. Trials:   Patient Position: HOB 60   Vocal quality prior to P.O.: No impairment   Consistency Presented: Thin liquid, Nectar thick liquid, Puree   How Presented: SLP-fed/presented, Cup/sip, Spoon, Straw       Bolus Acceptance: Impaired   Bolus Formation/Control: Impaired   Type of Impairment: Delayed, Mastication, Incomplete   Propulsion: Discoordination, Delayed (# of seconds)   Oral Residue: Greater than 50% of bolus, Pocketing   Initiation of Swallow: Delayed (# of seconds)   Laryngeal Elevation: Functional   Aspiration Signs/Symptoms: Clear throat   Pharyngeal Phase Characteristics: Audible swallow   Effective Modifications: Alternate liquids/solids, Small sips and bites   Cues for Modifications: Maximal         Oral Phase Severity: Moderate   Pharyngeal Phase Severity : Mild    PAIN:  Pain level pre-treatment: 0/10   Pain level post-treatment: 0/10     After treatment:   ?              Patient left in no apparent distress sitting up in chair  ? Patient left in no apparent distress in bed  ? Call bell left within reach  ? Nursing notified  ? Family present  ? Caregiver present  ? Bed alarm activated      COMMUNICATION/EDUCATION:   ? Aspiration precautions; swallow safety; compensatory techniques  ? Patient unable to participate in education; education ongoing with staff  ? Posted safety precautions in patient's room.   ? Oral-motor/laryngeal strengthening exercises      FLAQUITA Daniel  Time Calculation: 10 mins-

## 2019-10-22 NOTE — PROGRESS NOTES
Hospitalist Progress Note    Patient: Bhakti Khanna MRN: [id number]  CSN: [id number]    YOB: 1935  Age: 81 y.o. Sex: male    DOA: 10/16/2019 LOS:  LOS: 6 days          Chief Complaint: Ac resp failure      Assessment/Plan       80 yo CVA and dementia patient admitted to ICU for ac resp failure from nursing home  Improved and weaned from bipap, now on tele floor  Concern about intermittent tolerance of pureed with advanced dementia and inability to follow cues    Had nuclear stress test this am     Acute resp failure -- improved-acute on chronic systolic and diastolic CHF-continue IV lasix    Dysphagia-speech to eval, NPO in interim, high risk for aspiration, started empiric zosyn     Advanced dementia-full code at familys request     PRAKASH with ckd stage 3/4 -- cr is stable. Cont to follow daily     hypokalemia-IV runs ordered this am     UTI - cover with zosyn for now     Anemia due to CKD --stable Hgb     NSTEMI - stable cardiac monitor/tele-stress test this am     Weakness -- PT/OT as tolerated    Poor prognosis overall    Will return to longterm care when stable    Disposition :  Patient Active Problem List   Diagnosis Code    Acute exacerbation of CHF (congestive heart failure) ([facility]) I50.9    CKD (chronic kidney disease) stage 3, GFR 30-59 ml/min ([facility]) N18.3    Dementia ([facility]) F03.90    History of CVA (cerebrovascular accident) Z86.73    Insulin dependent diabetes mellitus with complications ([facility]) B39.5, Z79.4    UTI (urinary tract infection) N39.0    Acute respiratory failure ([facility]) J96.00    Acute on chronic combined systolic and diastolic CHF (congestive heart failure) ([facility]) I50.43    NSTEMI (non-ST elevated myocardial infarction) ([facility] [address] I21.4       Subjective:  Can I get something to drink and eat!       Review of systems:      Respiratory: denies SOB, cough  Cardiovascular: denies chest pain  Gastrointestinal: denies nausea      Vital signs/Intake and Output:  Visit Vitals  BP 148/69 (BP 1 Location: Right arm, BP Patient Position: At rest)   Pulse (!) 103   Temp 98.7 °F (37.1 °C)   Resp 19   Ht 5' 8\" (1.727 m)   Wt 96.9 kg (213 lb 10 oz)   SpO2 98%   BMI 32.48 kg/m²     Current Shift:  No intake/output data recorded. Last three shifts:  10/20 1901 - 10/22 0700  In: 480 [P.O.:180; I.V.:300]  Out: 120 [Urine:120]    Exam:    General: elderly demented BM, NAd  Head/Neck: NCAT, supple, No masses, No lymphadenopathy  CVS:Regular rate and rhythm, no M/R/G, S1/S2 heard, no thrill  Lungs:Clear to auscultation bilaterally, no wheezes, rhonchi, or rales  Abdomen: Soft, Nontender, No distention, Normal Bowel sounds, No hepatomegaly  Extremities: chronic 2 plus stasis edema LE BL  Neuro:grossly normal , follows commands  Psych:appropriate                Labs: Results:       Chemistry Recent Labs     10/22/19  0430 10/21/19  0125 10/20/19  0712   * 180* 163*   * 146* 143   K 3.2* 3.3* 3.4*    108 105   CO2 35* 33* 32   BUN 50* 56* 55*   CREA 2.77* 2.78* 2.87*   CA 8.7 8.2* 8.8   AGAP 5 5 6   BUCR 18 20 19   AP  --  84  --    TP  --  6.6  --    ALB  --  2.4*  --    GLOB  --  4.2*  --    AGRAT  --  0.6*  --       CBC w/Diff Recent Labs     10/22/19  0430 10/21/19  0125 10/20/19  0712   WBC 7.6 7.3 8.1   RBC 3.00* 2.79* 2.88*   HGB 8.2* 7.7* 8.0*   HCT 27.0* 24.8* 25.7*    120* 126*   GRANS 68 69 70   LYMPH 18* 18* 16*   EOS 5 3 5      Cardiac Enzymes No results for input(s): CPK, CKND1, ISIDORO in the last 72 hours. No lab exists for component: CKRMB, TROIP   Coagulation No results for input(s): PTP, INR, APTT, INREXT in the last 72 hours. Lipid Panel No results found for: CHOL, CHOLPOCT, CHOLX, CHLST, CHOLV, 199412, HDL, HDLP, LDL, LDLC, DLDLP, 559169, VLDLC, VLDL, TGLX, TRIGL, TRIGP, TGLPOCT, CHHD, CHHDX   BNP No results for input(s): BNPP in the last 72 hours.    Liver Enzymes Recent Labs     10/21/19  0125   TP 6.6   ALB 2.4*   AP 84   SGOT 20      Thyroid Studies Lab Results   Component Value Date/Time    TSH 0.45 10/18/2019 04:10 AM        Procedures/imaging: see electronic medical records for all procedures/Xrays and details which were not copied into this note but were reviewed prior to creation of Ezekiel Guzmán MD

## 2019-10-22 NOTE — PROGRESS NOTES
DC Plan: Discharge to [facility] for LTC, once medically stable    Chart reviewed. It is noted pt is LTC @ [facility]. This CM spoke with Jessica Martínez and [facility] and she confirmed pt can return. Will need Bipap settings. CM will cont to follow for transition of care needs, .     Ovi Ramirez,Rk Son   [phone]   Rhiannon Da Silva Daughter   [phone]   Marcella Carlos Daughter  [phone]

## 2019-10-22 NOTE — PROGRESS NOTES
[address] at this time. 2057 - Spoke with Dr. Leo Orantes regarding NPO status and order Eliquis. Telephone orders to d/c Eliquis for Lovenox 40mg once daily. 2103 - Paged Dr. Leo Orantes regarding Lovenox being pork derived and pt unable to verbalize severity of allergy. 2104 - Spoke with Dr. Leo Orantes at this time new orders for Heparin. 2108 - Paged Dr. Leo Orantes regarding Heparin being pork derived. 2108 - Spoke with Dr. Leo Orantes at this time, utilize only  SCDs ordered for now being that pt is unable to verbalized severity of pork allergy. 2318 - Patient A&Ox1, 2L NC. No signs of discomfort or distress. Mepilex dressing to chest skin tear CDI. SCD compression device bilaterally. Will continue to monitor. 80 - Oncall paged regarding potassium. 36 - Spoke with Dr. Leo Orantes at this time. Telephone orders for 1 run of IV potassium.

## 2019-10-22 NOTE — PROGRESS NOTES
Cardiology Progress Note        Patient: Yaquelin Sosa        Sex: male          DOA: 10/16/2019  YOB: 1935      Age:  81 y.o.        LOS:  LOS: 5 days    Patient seen and examined, chart reviewed. Assessment/Plan     Patient Active Problem List   Diagnosis Code    Acute exacerbation of CHF (congestive heart failure) ([facility]) I50.9    CKD (chronic kidney disease) stage 3, GFR 30-59 ml/min ([facility]) N18.3    Dementia ([facility]) F03.90    History of CVA (cerebrovascular accident) Z86.73    Insulin dependent diabetes mellitus with complications ([facility] H67.2, Z79.4    UTI (urinary tract infection) N39.0    Acute respiratory failure ([facility] [address] J96.00    Acute on chronic combined systolic and diastolic CHF (congestive heart failure) ([facility]) I50.43    NSTEMI (non-ST elevated myocardial infarction) ([facility] [address] I21.4      LVEF 36-40%  Thrombocytopenia    Plan:    Continue Metoprolol succinate 25 mg once a day. Continue Nitrates. No aspirin due to risk of bleeding with concomitant anticoagulation therapy. Continue statin. Continue Lasix  Strict I/O  Monitor renal function  Continue management as per hospital medicine   Due to dementia with CKD will do medical management for NSTEMI. Plan discussed with patient's son and all treatment plan discussed with him. Discussed about medical management vs medical management/stress test vs cardiac catheterization. All risks, benefits and alternatives explained to patient's son. He would like to continue medical management/stress test.              Subjective:    cc:  I am ok       REVIEW OF SYSTEMS:     General: No fevers or chills. Cardiovascular: No chest pain,No palpitations, No orthopnea, No PND, No leg swelling, No claudication  Pulmonary: Positive dyspnea.    Gastrointestinal: No nausea, vomiting, bleeding  Neurology: No Dizziness    Objective:      Visit Vitals  /81 (BP 1 Location: Right arm, BP Patient Position: At rest)   Pulse (!) 101 Temp 98.7 °F (37.1 °C)   Resp 21   Ht 5' 8\" (1.727 m)   Wt 98 kg (216 lb)   SpO2 100%   BMI 32.84 kg/m²     Body mass index is 32.84 kg/m². Physical Exam:  General Appearance: Comfortable, not using accessory muscles of respiration. HEENT: DELONTE. HEAD: Atraumatic  NECK: No JVD, no thyroidomeglay. CAROTIDS: No bruit  LUNGS: Clear bilaterally. HEART: S1+S2 audible, no murmur, no pericardial rub. ABD: Non-tender, BS Audible    EXT: No edema, and no cyanosis. VASCULAR EXAM: Pulses are intact.     CNS: Alert, follows verbal commands  Medication:  Current Facility-Administered Medications   Medication Dose Route Frequency    insulin lispro (HUMALOG) injection   SubCUTAneous Q6H    piperacillin-tazobactam (ZOSYN) 2.25 g in 0.9% sodium chloride (MBP/ADV) 50 mL MBP  2.25 g IntraVENous Q6H    furosemide (LASIX) injection 40 mg  40 mg IntraVENous ONCE    [START ON 10/22/2019] furosemide (LASIX) injection 40 mg  40 mg IntraVENous BID    perflutren lipid microspheres (DEFINITY) in NS bolus IV  1 mL IntraVENous PRN    nystatin (MYCOSTATIN) 100,000 unit/mL oral suspension 500,000 Units  500,000 Units Oral QID    metoprolol succinate (TOPROL-XL) XL tablet 25 mg  25 mg Oral DAILY    alfuzosin SR (UROXATRAL) tablet 10 mg  10 mg Oral DAILY    atorvastatin (LIPITOR) tablet 40 mg  40 mg Oral DAILY    isosorbide mononitrate ER (IMDUR) tablet 60 mg  60 mg Oral DAILY    albuterol-ipratropium (DUO-NEB) 2.5 MG-0.5 MG/3 ML  3 mL Nebulization Q6H PRN    sodium chloride (NS) flush 5-10 mL  5-10 mL IntraVENous PRN    sodium chloride (NS) flush 5-40 mL  5-40 mL IntraVENous Q8H    sodium chloride (NS) flush 5-40 mL  5-40 mL IntraVENous PRN    pantoprazole (PROTONIX) 40 mg in sodium chloride 0.9% 10 mL injection  40 mg IntraVENous DAILY    glucose chewable tablet 16 g  16 g Oral PRN    glucagon (GLUCAGEN) injection 1 mg  1 mg IntraMUSCular PRN    dextrose 10% infusion 125-250 mL  125-250 mL IntraVENous PRN    hydrALAZINE (APRESOLINE) 20 mg/mL injection 10 mg  10 mg IntraVENous Q6H PRN               Lab/Data Reviewed:       Recent Labs     10/21/19  0125 10/20/19  0712 10/19/19  0400   WBC 7.3 8.1 9.2   HGB 7.7* 8.0* 8.1*   HCT 24.8* 25.7* 26.4*   * 126* 124*     Recent Labs     10/21/19  0125 10/20/19  0712 10/19/19  0400   * 143 143   K 3.3* 3.4* 3.5    105 104   CO2 33* 32 32   * 163* 105*   BUN 56* 55* 54*   CREA 2.78* 2.87* 2.88*   CA 8.2* 8.8 8.3*         Signed By: Liset Yen MD     October 21, 2019

## 2019-10-22 NOTE — ROUTINE PROCESS
Bedside and Verbal shift change report given to Vega Win RN by Jennifer Granados RN. Report included the following information SBAR, Kardex, OR Summary, Intake/Output and MAR.

## 2019-10-23 LAB
ANION GAP SERPL CALC-SCNC: 5 MMOL/L (ref 3–18)
BASOPHILS # BLD: 0.1 K/UL (ref 0–0.1)
BASOPHILS NFR BLD: 1 % (ref 0–2)
BUN SERPL-MCNC: 49 MG/DL (ref 7–18)
BUN/CREAT SERPL: 17 (ref 12–20)
CALCIUM SERPL-MCNC: 8.6 MG/DL (ref 8.5–10.1)
CHLORIDE SERPL-SCNC: 109 MMOL/L (ref 100–111)
CO2 SERPL-SCNC: 34 MMOL/L (ref 21–32)
CREAT SERPL-MCNC: 2.86 MG/DL (ref 0.6–1.3)
DIFFERENTIAL METHOD BLD: ABNORMAL
EOSINOPHIL # BLD: 0.4 K/UL (ref 0–0.4)
EOSINOPHIL NFR BLD: 4 % (ref 0–5)
ERYTHROCYTE [DISTWIDTH] IN BLOOD BY AUTOMATED COUNT: 15 % (ref 11.6–14.5)
GLUCOSE BLD STRIP.AUTO-MCNC: 215 MG/DL (ref 70–110)
GLUCOSE BLD STRIP.AUTO-MCNC: 239 MG/DL (ref 70–110)
GLUCOSE BLD STRIP.AUTO-MCNC: 300 MG/DL (ref 70–110)
GLUCOSE SERPL-MCNC: 182 MG/DL (ref 74–99)
HCT VFR BLD AUTO: 29.4 % (ref 36–48)
HGB BLD-MCNC: 8.8 G/DL (ref 13–16)
LYMPHOCYTES # BLD: 1.2 K/UL (ref 0.9–3.6)
LYMPHOCYTES NFR BLD: 13 % (ref 21–52)
MCH RBC QN AUTO: 27.1 PG (ref 24–34)
MCHC RBC AUTO-ENTMCNC: 29.9 G/DL (ref 31–37)
MCV RBC AUTO: 90.5 FL (ref 74–97)
MONOCYTES # BLD: 0.8 K/UL (ref 0.05–1.2)
MONOCYTES NFR BLD: 9 % (ref 3–10)
NEUTS SEG # BLD: 6.5 K/UL (ref 1.8–8)
NEUTS SEG NFR BLD: 73 % (ref 40–73)
PLATELET # BLD AUTO: 161 K/UL (ref 135–420)
PMV BLD AUTO: 11.1 FL (ref 9.2–11.8)
POTASSIUM SERPL-SCNC: 3.4 MMOL/L (ref 3.5–5.5)
RBC # BLD AUTO: 3.25 M/UL (ref 4.7–5.5)
SODIUM SERPL-SCNC: 148 MMOL/L (ref 136–145)
WBC # BLD AUTO: 8.8 K/UL (ref 4.6–13.2)

## 2019-10-23 PROCEDURE — 74011250637 HC RX REV CODE- 250/637: Performed by: FAMILY MEDICINE

## 2019-10-23 PROCEDURE — 82962 GLUCOSE BLOOD TEST: CPT

## 2019-10-23 PROCEDURE — 74011636637 HC RX REV CODE- 636/637: Performed by: FAMILY MEDICINE

## 2019-10-23 PROCEDURE — 74011250637 HC RX REV CODE- 250/637: Performed by: HOSPITALIST

## 2019-10-23 PROCEDURE — 74011250637 HC RX REV CODE- 250/637: Performed by: INTERNAL MEDICINE

## 2019-10-23 PROCEDURE — 74011250636 HC RX REV CODE- 250/636: Performed by: HOSPITALIST

## 2019-10-23 PROCEDURE — 85025 COMPLETE CBC W/AUTO DIFF WBC: CPT

## 2019-10-23 PROCEDURE — 74011000258 HC RX REV CODE- 258: Performed by: HOSPITALIST

## 2019-10-23 PROCEDURE — 80048 BASIC METABOLIC PNL TOTAL CA: CPT

## 2019-10-23 PROCEDURE — 77010033678 HC OXYGEN DAILY

## 2019-10-23 PROCEDURE — 36415 COLL VENOUS BLD VENIPUNCTURE: CPT

## 2019-10-23 PROCEDURE — 65660000000 HC RM CCU STEPDOWN

## 2019-10-23 RX ORDER — FUROSEMIDE 40 MG/1
40 TABLET ORAL DAILY
Status: DISCONTINUED | OUTPATIENT
Start: 2019-10-23 | End: 2019-10-24

## 2019-10-23 RX ORDER — FAMOTIDINE 20 MG/1
20 TABLET, FILM COATED ORAL DAILY
Status: DISCONTINUED | OUTPATIENT
Start: 2019-10-24 | End: 2019-10-30 | Stop reason: HOSPADM

## 2019-10-23 RX ORDER — ATROPA BELLADONNA AND OPIUM 16.2; 3 MG/1; MG/1
1 SUPPOSITORY RECTAL
Status: DISCONTINUED | OUTPATIENT
Start: 2019-10-23 | End: 2019-10-24

## 2019-10-23 RX ORDER — ISOSORBIDE MONONITRATE 20 MG/1
30 TABLET ORAL 2 TIMES DAILY
Status: DISCONTINUED | OUTPATIENT
Start: 2019-10-23 | End: 2019-10-23

## 2019-10-23 RX ORDER — ISOSORBIDE DINITRATE 20 MG/1
20 TABLET ORAL 3 TIMES DAILY
Status: DISCONTINUED | OUTPATIENT
Start: 2019-10-23 | End: 2019-10-30 | Stop reason: HOSPADM

## 2019-10-23 RX ORDER — FAMOTIDINE 20 MG/1
20 TABLET, FILM COATED ORAL 2 TIMES DAILY
Status: DISCONTINUED | OUTPATIENT
Start: 2019-10-23 | End: 2019-10-23 | Stop reason: DRUGHIGH

## 2019-10-23 RX ORDER — NYSTATIN 100000 [USP'U]/G
POWDER TOPICAL 3 TIMES DAILY
Status: DISCONTINUED | OUTPATIENT
Start: 2019-10-23 | End: 2019-10-30 | Stop reason: HOSPADM

## 2019-10-23 RX ORDER — METOPROLOL TARTRATE 25 MG/1
25 TABLET, FILM COATED ORAL EVERY 12 HOURS
Status: DISCONTINUED | OUTPATIENT
Start: 2019-10-23 | End: 2019-10-30 | Stop reason: HOSPADM

## 2019-10-23 RX ADMIN — FUROSEMIDE 40 MG: 40 TABLET ORAL at 10:11

## 2019-10-23 RX ADMIN — NYSTATIN: 100000 POWDER TOPICAL at 10:09

## 2019-10-23 RX ADMIN — POTASSIUM BICARBONATE 20 MEQ: 782 TABLET, EFFERVESCENT ORAL at 23:44

## 2019-10-23 RX ADMIN — APIXABAN 2.5 MG: 2.5 TABLET, FILM COATED ORAL at 23:44

## 2019-10-23 RX ADMIN — ISOSORBIDE DINITRATE 20 MG: 20 TABLET ORAL at 23:44

## 2019-10-23 RX ADMIN — ATORVASTATIN CALCIUM 40 MG: 20 TABLET, FILM COATED ORAL at 10:09

## 2019-10-23 RX ADMIN — INSULIN LISPRO 6 UNITS: 100 INJECTION, SOLUTION INTRAVENOUS; SUBCUTANEOUS at 06:00

## 2019-10-23 RX ADMIN — METOPROLOL TARTRATE 25 MG: 25 TABLET, FILM COATED ORAL at 23:44

## 2019-10-23 RX ADMIN — NYSTATIN: 100000 POWDER TOPICAL at 15:03

## 2019-10-23 RX ADMIN — NYSTATIN: 100000 POWDER TOPICAL at 22:47

## 2019-10-23 RX ADMIN — NYSTATIN 500000 UNITS: 100000 SUSPENSION ORAL at 23:44

## 2019-10-23 RX ADMIN — POTASSIUM BICARBONATE 20 MEQ: 782 TABLET, EFFERVESCENT ORAL at 12:16

## 2019-10-23 RX ADMIN — PIPERACILLIN SODIUM,TAZOBACTAM SODIUM 2.25 G: 2; .25 INJECTION, POWDER, FOR SOLUTION INTRAVENOUS at 13:17

## 2019-10-23 RX ADMIN — NYSTATIN 500000 UNITS: 100000 SUSPENSION ORAL at 18:29

## 2019-10-23 RX ADMIN — FAMOTIDINE 20 MG: 20 TABLET ORAL at 10:11

## 2019-10-23 RX ADMIN — APIXABAN 2.5 MG: 2.5 TABLET, FILM COATED ORAL at 12:16

## 2019-10-23 RX ADMIN — METOPROLOL TARTRATE 25 MG: 25 TABLET, FILM COATED ORAL at 10:09

## 2019-10-23 RX ADMIN — NYSTATIN 500000 UNITS: 100000 SUSPENSION ORAL at 12:16

## 2019-10-23 RX ADMIN — Medication 10 ML: at 07:22

## 2019-10-23 RX ADMIN — INSULIN LISPRO 12 UNITS: 100 INJECTION, SOLUTION INTRAVENOUS; SUBCUTANEOUS at 18:28

## 2019-10-23 RX ADMIN — Medication 10 ML: at 23:48

## 2019-10-23 RX ADMIN — NYSTATIN 500000 UNITS: 100000 SUSPENSION ORAL at 10:09

## 2019-10-23 RX ADMIN — INSULIN LISPRO 6 UNITS: 100 INJECTION, SOLUTION INTRAVENOUS; SUBCUTANEOUS at 13:19

## 2019-10-23 RX ADMIN — Medication 10 ML: at 18:33

## 2019-10-23 RX ADMIN — ATROPA BELLADONNA AND OPIUM 1 SUPPOSITORY: 16.2; 3 SUPPOSITORY RECTAL at 22:39

## 2019-10-23 RX ADMIN — PIPERACILLIN SODIUM,TAZOBACTAM SODIUM 2.25 G: 2; .25 INJECTION, POWDER, FOR SOLUTION INTRAVENOUS at 18:29

## 2019-10-23 RX ADMIN — PIPERACILLIN SODIUM,TAZOBACTAM SODIUM 2.25 G: 2; .25 INJECTION, POWDER, FOR SOLUTION INTRAVENOUS at 07:21

## 2019-10-23 RX ADMIN — ISOSORBIDE DINITRATE 20 MG: 20 TABLET ORAL at 15:03

## 2019-10-23 NOTE — PROGRESS NOTES
0015 Pt c/o painful urination. PT on zosyn for UTI. Pt has scrotal excoriation. Will notify MD.     3735 Orders received for nystatin powder and opium-belladonna TID PRN. 0714 Bedside shift change report given to OZ Singh RN (oncoming nurse) by Aleksandra Martinez. Maribel Sanders RN (offgoing nurse). Report included the following information SBAR, Kardex, Intake/Output, MAR and Recent Results.

## 2019-10-23 NOTE — PROGRESS NOTES
Problem: Falls - Risk of  Goal: *Absence of Falls  Description  Document Dmitri Juan Fall Risk and appropriate interventions in the flowsheet. Outcome: Progressing Towards Goal  Note:   Fall Risk Interventions:  Mobility Interventions: Assess mobility with egress test, Communicate number of staff needed for ambulation/transfer, Bed/chair exit alarm, Patient to call before getting OOB, OT consult for ADLs, PT Consult for mobility concerns, PT Consult for assist device competence, Strengthening exercises (ROM-active/passive)    Mentation Interventions: Adequate sleep, hydration, pain control, Bed/chair exit alarm, Door open when patient unattended, Toileting rounds, Reorient patient, Increase mobility, More frequent rounding, Update white board, Room close to nurse's station    Medication Interventions: Patient to call before getting OOB, Teach patient to arise slowly, Bed/chair exit alarm, Evaluate medications/consider consulting pharmacy    Elimination Interventions: Bed/chair exit alarm, Call light in reach, Patient to call for help with toileting needs, Toilet paper/wipes in reach, Toileting schedule/hourly rounds, Urinal in reach              Problem: Patient Education: Go to Patient Education Activity  Goal: Patient/Family Education  Outcome: Progressing Towards Goal     Problem: Pressure Injury - Risk of  Goal: *Prevention of pressure injury  Description  Document Oseas Scale and appropriate interventions in the flowsheet.   Outcome: Progressing Towards Goal  Note:   Pressure Injury Interventions:  Sensory Interventions: Assess changes in LOC, Avoid rigorous massage over bony prominences, Check visual cues for pain, Keep linens dry and wrinkle-free, Pressure redistribution bed/mattress (bed type)    Moisture Interventions: Absorbent underpads    Activity Interventions: Pressure redistribution bed/mattress(bed type)    Mobility Interventions: Pressure redistribution bed/mattress (bed type), HOB 30 degrees or less    Nutrition Interventions: Document food/fluid/supplement intake, Offer support with meals,snacks and hydration    Friction and Shear Interventions: HOB 30 degrees or less, Lift sheet, Transferring/repositioning devices                Problem: Patient Education: Go to Patient Education Activity  Goal: Patient/Family Education  Outcome: Progressing Towards Goal     Problem: Pain  Goal: *Control of Pain  Outcome: Progressing Towards Goal     Problem: Patient Education: Go to Patient Education Activity  Goal: Patient/Family Education  Outcome: Progressing Towards Goal     Problem: Heart Failure: Day 2  Goal: Activity/Safety  Outcome: Progressing Towards Goal  Goal: Consults, if ordered  Outcome: Progressing Towards Goal  Goal: Diagnostic Test/Procedures  Outcome: Progressing Towards Goal  Goal: Nutrition/Diet  Outcome: Progressing Towards Goal  Goal: Discharge Planning  Outcome: Progressing Towards Goal  Goal: Medications  Outcome: Progressing Towards Goal  Goal: Respiratory  Outcome: Progressing Towards Goal  Goal: Treatments/Interventions/Procedures  Outcome: Progressing Towards Goal  Goal: *Oxygen saturation within defined limits  Outcome: Progressing Towards Goal  Goal: *Hemodynamically stable  Outcome: Progressing Towards Goal  Goal: *Optimal pain control at patient's stated goal  Outcome: Progressing Towards Goal  Goal: *Anxiety reduced or absent  Outcome: Progressing Towards Goal  Goal: *Demonstrates progressive activity  Outcome: Progressing Towards Goal     Problem: Patient Education: Go to Patient Education Activity  Goal: Patient/Family Education  Outcome: Progressing Towards Goal     Problem: Patient Education: Go to Patient Education Activity  Goal: Patient/Family Education  Outcome: Progressing Towards Goal     Problem: Gas Exchange - Impaired  Goal: *Absence of hypoxia  Outcome: Progressing Towards Goal     Problem: Patient Education: Go to Patient Education Activity  Goal: Patient/Family Education  Outcome: Progressing Towards Goal     Problem: Dysphagia (Adult)  Goal: *Speech Goal: (INSERT TEXT)  Outcome: Progressing Towards Goal     Problem: Patient Education: Go to Patient Education Activity  Goal: Patient/Family Education  Outcome: Progressing Towards Goal     Problem: Aspiration - Risk of  Goal: *Absence of aspiration  Outcome: Progressing Towards Goal     Problem: Patient Education: Go to Patient Education Activity  Goal: Patient/Family Education  Outcome: Progressing Towards Goal     Problem: Nutrition Deficit  Goal: *Optimize nutritional status  Outcome: Progressing Towards Goal

## 2019-10-23 NOTE — PROGRESS NOTES
Pharmacy Renal Dosing Service    Famotidine was automatically dose-adjusted per THE [facility] P&T Committee Protocol, with respect to renal function. Dose adjusted to:  Famotidine 20 mg PO daily. Pt Weight:   Wt Readings from Last 1 Encounters:   10/23/19 95.9 kg (211 lb 6.7 oz)       Previous Regimen   Famotidine 20 mg PO twice daily   Serum Creatinine Lab Results   Component Value Date/Time    Creatinine 2.86 (H) 10/23/2019 12:45 AM         Creatinine Clearance Estimated Creatinine Clearance: 22.4 mL/min (A) (based on SCr of 2.86 mg/dL (H)). BUN Lab Results   Component Value Date/Time    BUN 49 (H) 10/23/2019 12:45 AM             Pharmacy to continue to monitor patient daily. Will make dosage adjustments based upon changing renal function.   Signed Tru Massey information:  [phone]

## 2019-10-23 NOTE — ROUTINE PROCESS
0700: received bedside shift report from Kitty Irby RN (offgoing nurse) and assumed care of the pt. Oriented to date/time/place, cleaned large bowel movement, updated white board, left bed in lowest position, bed alarm on and call light within reach. 0900: pt's diet dysphagia puree with thickened liquids, will discuss the topic of ordered meds that cannot be swallowed whole with Dr. Hair Cotto. 0930: spoke with Dr. Hair Cotto and Jadiel Butler from pharmacy, working to collaborate on a alternative to imdur and alfuzosin (as meds cannot be crushed in order to be administered) Pt fed and most morning meds administered crushed in apple sauce. Waiting for a call back from Jadiel Butler. 0945: luh from pharmacy stated that imdur can still be given as long as it's not ER can be crushed, and that instead of alfuzosin, terazosin can be used. Spoke with Dr. Hair Cotto who changed the dosing of imdur and asked me to hold the alfuzosin. 1300: purposeful hourly rounds, pt sitting up in bed being fed his lunch. Still crying out in pain every time he urinates. Will continue to monitor, bed in lowest position and call light within reach. 1500: purposeful hourly rounding, pt sitting up in bed resting, still having lots of pain with his urination, but enjoys singing with staff. Bed in lowest position and call light within reach. 1900: shift summary, shift uneventful, no complaints of chest pain or shortness of breath.   
 
Ankita Healy RN

## 2019-10-23 NOTE — PROGRESS NOTES
DC Plan: Discharge to [facility] for LTC,  anticipate dc tomorrow     Chart reviewed. It is noted pt is LTC @ [facility]. This CM spoke with Devin Favre and [facility] and she confirmed pt can return. Yosef aware we are anticipating dc tomorrow. Will need Bipap settings.   CM will cont to follow for transition of care needs, [phone].     Ovi Ramirez,Rk Son     [phone]   Joelraymond Bernal Daughter     [phone]   Rickie Blanco Daughter   [phone]

## 2019-10-23 NOTE — PROGRESS NOTES
Problem: Falls - Risk of  Goal: *Absence of Falls  Description  Document Mikayla Vogels Fall Risk and appropriate interventions in the flowsheet. Outcome: Progressing Towards Goal  Note:   Fall Risk Interventions:  Mobility Interventions: Bed/chair exit alarm, Communicate number of staff needed for ambulation/transfer, PT Consult for mobility concerns, Strengthening exercises (ROM-active/passive)    Mentation Interventions: Bed/chair exit alarm, Door open when patient unattended, Familiar objects from home, Reorient patient, Toileting rounds, Update white board, Adequate sleep, hydration, pain control    Medication Interventions: Assess postural VS orthostatic hypotension, Bed/chair exit alarm, Patient to call before getting OOB, Teach patient to arise slowly    Elimination Interventions: Bed/chair exit alarm, Call light in reach, Toileting schedule/hourly rounds              Problem: Patient Education: Go to Patient Education Activity  Goal: Patient/Family Education  Outcome: Progressing Towards Goal     Problem: Pressure Injury - Risk of  Goal: *Prevention of pressure injury  Description  Document Oseas Scale and appropriate interventions in the flowsheet. Outcome: Progressing Towards Goal  Note:   Pressure Injury Interventions:  Sensory Interventions: Keep linens dry and wrinkle-free, Float heels, Assess changes in LOC, Assess need for specialty bed, Avoid rigorous massage over bony prominences, Pressure redistribution bed/mattress (bed type), Turn and reposition approx. every two hours (pillows and wedges if needed)    Moisture Interventions: Absorbent underpads, Check for incontinence Q2 hours and as needed, Moisture barrier    Activity Interventions: Assess need for specialty bed, Pressure redistribution bed/mattress(bed type), PT/OT evaluation    Mobility Interventions: Assess need for specialty bed, Pressure redistribution bed/mattress (bed type), Turn and reposition approx.  every two hours(pillow and wedges)    Nutrition Interventions: Document food/fluid/supplement intake, Offer support with meals,snacks and hydration    Friction and Shear Interventions: Apply protective barrier, creams and emollients, Minimize layers                Problem: Patient Education: Go to Patient Education Activity  Goal: Patient/Family Education  Outcome: Progressing Towards Goal     Problem: Pain  Goal: *Control of Pain  Outcome: Progressing Towards Goal     Problem: Patient Education: Go to Patient Education Activity  Goal: Patient/Family Education  Outcome: Progressing Towards Goal     Problem: Patient Education: Go to Patient Education Activity  Goal: Patient/Family Education  Outcome: Progressing Towards Goal     Problem: Patient Education: Go to Patient Education Activity  Goal: Patient/Family Education  Outcome: Progressing Towards Goal     Problem: Gas Exchange - Impaired  Goal: *Absence of hypoxia  Outcome: Progressing Towards Goal     Problem: Patient Education: Go to Patient Education Activity  Goal: Patient/Family Education  Outcome: Progressing Towards Goal     Problem: Dysphagia (Adult)  Goal: *Speech Goal: (INSERT TEXT)  Outcome: Progressing Towards Goal     Problem: Patient Education: Go to Patient Education Activity  Goal: Patient/Family Education  Outcome: Progressing Towards Goal     Problem: Aspiration - Risk of  Goal: *Absence of aspiration  Outcome: Progressing Towards Goal     Problem: Patient Education: Go to Patient Education Activity  Goal: Patient/Family Education  Outcome: Progressing Towards Goal     Problem: Nutrition Deficit  Goal: *Optimize nutritional status  Outcome: Progressing Towards Goal    Enrike Aponte RN

## 2019-10-23 NOTE — ROUTINE PROCESS
Bedside shift change report given to Jesus Manuel Patterson RN (oncoming nurse) by Juli Morocho RN (offgoing nurse). Report included the following information SBAR, Kardex, Intake/Output, MAR and Cardiac Rhythm SR, BBB, 1st Degree. Visit Vitals /64 Pulse 73 Temp 98.1 °F (36.7 °C) Resp 16 Ht 5' 8\" (1.727 m) Wt 95.9 kg (211 lb 6.7 oz) SpO2 99% BMI 32.15 kg/m² Juli Morocho RN

## 2019-10-23 NOTE — PROGRESS NOTES
Cardiology Progress Note        Patient: Calista Guevara        Sex: male          DOA: 10/16/2019  YOB: 1935      Age:  81 y.o.        LOS:  LOS: 6 days    Patient seen and examined, chart reviewed. Assessment/Plan     Patient Active Problem List   Diagnosis Code    Acute exacerbation of CHF (congestive heart failure) ([facility]) I50.9    Dementia ([facility]) F03.90    History of CVA (cerebrovascular accident) Z86.73    Insulin dependent diabetes mellitus with complications ([facility]) Q12.2, Z79.4    UTI (urinary tract infection) N39.0    Acute respiratory failure ([facility]) J96.00    Acute on chronic combined systolic and diastolic CHF (congestive heart failure) ([facility]) I50.43    NSTEMI (non-ST elevated myocardial infarction) ([facility]) I21.4    CKD (chronic kidney disease) stage 4, GFR 15-29 ml/min ([facility]) N18.4      LVEF 36-40%  Thrombocytopenia  Lexiscan stress test is abnormal and positve for ischemia/infarction. Plan:    Continue Metoprolol succinate 25 mg once a day. Continue Nitrates. No aspirin due to risk of bleeding with concomitant anticoagulation therapy. Continue statin. Change Lasix to 40 mg once a day. Strict I/O  Monitor renal function  Continue management as per hospital medicine   Medical management due to underlying comorbid condition. Subjective:    cc:  Not in any distress      REVIEW OF SYSTEMS:     General: No fevers or chills. Cardiovascular: No chest pain,No palpitations, No orthopnea, No PND, No leg swelling, No claudication  Pulmonary: Positive dyspnea. Gastrointestinal: No nausea, vomiting, bleeding  Neurology: No Dizziness    Objective:      Visit Vitals  /73 (BP 1 Location: Right arm, BP Patient Position: At rest)   Pulse 91   Temp 97.8 °F (36.6 °C)   Resp 24   Ht 5' 8\" (1.727 m)   Wt 96.9 kg (213 lb 10 oz)   SpO2 98%   BMI 32.48 kg/m²     Body mass index is 32.48 kg/m².     Physical Exam:  General Appearance: Comfortable, not using accessory muscles of respiration. HEENT: DELONTE. HEAD: Atraumatic  NECK: No JVD, no thyroidomeglay. CAROTIDS: No bruit  LUNGS: Clear bilaterally. HEART: S1+S2 audible, no murmur, no pericardial rub. ABD: Non-tender, BS Audible    EXT: No edema, and no cyanosis. VASCULAR EXAM: Pulses are intact.     CNS: Alert, follows verbal commands  Medication:  Current Facility-Administered Medications   Medication Dose Route Frequency    bisacodyl (DULCOLAX) tablet 5 mg  5 mg Oral DAILY PRN    bisacodyl (DULCOLAX) suppository 10 mg  10 mg Rectal DAILY PRN    insulin lispro (HUMALOG) injection   SubCUTAneous Q6H    piperacillin-tazobactam (ZOSYN) 2.25 g in 0.9% sodium chloride (MBP/ADV) 50 mL MBP  2.25 g IntraVENous Q6H    furosemide (LASIX) injection 40 mg  40 mg IntraVENous BID    perflutren lipid microspheres (DEFINITY) in NS bolus IV  1 mL IntraVENous PRN    nystatin (MYCOSTATIN) 100,000 unit/mL oral suspension 500,000 Units  500,000 Units Oral QID    metoprolol succinate (TOPROL-XL) XL tablet 25 mg  25 mg Oral DAILY    alfuzosin SR (UROXATRAL) tablet 10 mg  10 mg Oral DAILY    atorvastatin (LIPITOR) tablet 40 mg  40 mg Oral DAILY    isosorbide mononitrate ER (IMDUR) tablet 60 mg  60 mg Oral DAILY    albuterol-ipratropium (DUO-NEB) 2.5 MG-0.5 MG/3 ML  3 mL Nebulization Q6H PRN    sodium chloride (NS) flush 5-10 mL  5-10 mL IntraVENous PRN    sodium chloride (NS) flush 5-40 mL  5-40 mL IntraVENous Q8H    sodium chloride (NS) flush 5-40 mL  5-40 mL IntraVENous PRN    pantoprazole (PROTONIX) 40 mg in sodium chloride 0.9% 10 mL injection  40 mg IntraVENous DAILY    glucose chewable tablet 16 g  16 g Oral PRN    glucagon (GLUCAGEN) injection 1 mg  1 mg IntraMUSCular PRN    dextrose 10% infusion 125-250 mL  125-250 mL IntraVENous PRN    hydrALAZINE (APRESOLINE) 20 mg/mL injection 10 mg  10 mg IntraVENous Q6H PRN               Lab/Data Reviewed:       Recent Labs     10/22/19  4020 10/21/19  9573 10/20/19  0712   WBC 7.6 7.3 8.1   HGB 8.2* 7.7* 8.0*   HCT 27.0* 24.8* 25.7*    120* 126*     Recent Labs     10/22/19  0430 10/21/19  0125 10/20/19  0712   * 146* 143   K 3.2* 3.3* 3.4*    108 105   CO2 35* 33* 32   * 180* 163*   BUN 50* 56* 55*   CREA 2.77* 2.78* 2.87*   CA 8.7 8.2* 8.8         Signed By: Beverley Kurtz MD     October 22, 2019

## 2019-10-23 NOTE — PROGRESS NOTES
Hospitalist Progress Note    Patient: Danika Landaverde MRN: [id number]  CSN: [id number]    YOB: 1935  Age: 81 y.o. Sex: male    DOA: 10/16/2019 LOS:  LOS: 7 days          Chief Complaint: Ac resp failure      Assessment/Plan     82 yo CVA and dementia patient admitted to ICU for ac resp failure from nursing home  Improved and weaned from bipap, now on tele floor  Concern about intermittent tolerance of pureed with advanced dementia and inability to follow cues-he has eaten this am     Had nuclear stress test -showed ischemia-he is on eliquis at NH, resume      Acute resp failure -- improved-acute on chronic systolic and diastolic CHF     Dysphagia-modified diet, did well this am     Advanced dementia-full code at familys request     PRAKASH with ckd stage 3/4 -- cr is stable.  Cont to follow daily     hypokalemia-improved, continue oral repeltion     Asp pneumonia-finish IV zosyn today     Anemia due to CKD --stable Hgb     NSTEMI      Weakness -- PT/OT as tolerated     Poor prognosis overall  Expect if remains stable can d/c to nursing home tomorrow afternoon  Disposition :  Patient Active Problem List   Diagnosis Code    Acute exacerbation of CHF (congestive heart failure) ([facility]) I50.9    Dementia ([facility]) F03.90    History of CVA (cerebrovascular accident) Z86.73    Insulin dependent diabetes mellitus with complications ([address] Q26.3, Z79.4    UTI (urinary tract infection) N39.0    Acute respiratory failure ([address] J96.00    Acute on chronic combined systolic and diastolic CHF (congestive heart failure) ([facility]) I50.43    NSTEMI (non-ST elevated myocardial infarction) ([address] I21.4    CKD (chronic kidney disease) stage 4, GFR 15-29 ml/min ([address] N18.4       Subjective:    Denies new issue  Nurse states he ate  We adjusted med orders to make sure meds can be crushed for him    Review of systems:    Constitutional: denies fevers  Respiratory: denies SOB, cough  Cardiovascular: denies chest pain  Gastrointestinal: denies nausea      Vital signs/Intake and Output:  Visit Vitals  /78 (BP 1 Location: Right arm, BP Patient Position: At rest)   Pulse 99   Temp 99.8 °F (37.7 °C)   Resp 20   Ht 5' 8\" (1.727 m)   Wt 95.9 kg (211 lb 6.7 oz)   SpO2 96%   BMI 32.15 kg/m²     Current Shift:  No intake/output data recorded. Last three shifts:  10/21 0701 - 10/22 1900  In: 540 [P.O.:240; I.V.:300]  Out: -     Exam:    General:elderly demented obese BM, NAD  Head/Neck: NCAT, supple, No masses, No lymphadenopathy  CVS:Regular rate and rhythm, no M/R/G, S1/S2 heard, no thrill  Lungs:Clear to auscultation bilaterally, no wheezes, rhonchi, or rales  Abdomen: stasis edema LE BL  Neuro:grossly normal , follows commands  Psych:appropriate                Labs: Results:       Chemistry Recent Labs     10/23/19  0045 10/22/19  0430 10/21/19  0125   * 116* 180*   * 148* 146*   K 3.4* 3.2* 3.3*    108 108   CO2 34* 35* 33*   BUN 49* 50* 56*   CREA 2.86* 2.77* 2.78*   CA 8.6 8.7 8.2*   AGAP 5 5 5   BUCR 17 18 20   AP  --   --  84   TP  --   --  6.6   ALB  --   --  2.4*   GLOB  --   --  4.2*   AGRAT  --   --  0.6*      CBC w/Diff Recent Labs     10/23/19  0045 10/22/19  0430 10/21/19  0125   WBC 8.8 7.6 7.3   RBC 3.25* 3.00* 2.79*   HGB 8.8* 8.2* 7.7*   HCT 29.4* 27.0* 24.8*    149 120*   GRANS 73 68 69   LYMPH 13* 18* 18*   EOS 4 5 3      Cardiac Enzymes No results for input(s): CPK, CKND1, ISIDORO in the last 72 hours. No lab exists for component: CKRMB, TROIP   Coagulation No results for input(s): PTP, INR, APTT, INREXT in the last 72 hours. Lipid Panel No results found for: CHOL, CHOLPOCT, CHOLX, CHLST, CHOLV, 877977, HDL, HDLP, LDL, LDLC, DLDLP, 808854, VLDLC, VLDL, TGLX, TRIGL, TRIGP, TGLPOCT, CHHD, CHHDX   BNP No results for input(s): BNPP in the last 72 hours.    Liver Enzymes Recent Labs     10/21/19  0125   TP 6.6   ALB 2.4*   AP 84   SGOT 20      Thyroid Studies Lab Results   Component Value Date/Time    TSH 0.45 10/18/2019 04:10 AM        Procedures/imaging: see electronic medical records for all procedures/Xrays and details which were not copied into this note but were reviewed prior to creation of Julian Agudelo MD

## 2019-10-23 NOTE — CDMP QUERY
The progress notes indicate the patient has \"PRAKASH with CKD stage 3/4\". If possible, could you please document in progress notes and d/c summary the specific stage of CKD. 
 
=> CKD Stage 3 
=> CKD Stage 4  
=> Other Explanation of clinical findings 
=> Clinically Undetermined (no explanation for clinical findings) Thank you for your time,  
Roderick Nelson RN Clinical Documentation Improvement [phone]

## 2019-10-24 LAB
ANION GAP SERPL CALC-SCNC: 4 MMOL/L (ref 3–18)
BASOPHILS # BLD: 0 K/UL (ref 0–0.1)
BASOPHILS NFR BLD: 0 % (ref 0–2)
BUN SERPL-MCNC: 45 MG/DL (ref 7–18)
BUN/CREAT SERPL: 16 (ref 12–20)
CALCIUM SERPL-MCNC: 8.5 MG/DL (ref 8.5–10.1)
CHLORIDE SERPL-SCNC: 111 MMOL/L (ref 100–111)
CO2 SERPL-SCNC: 35 MMOL/L (ref 21–32)
CREAT SERPL-MCNC: 2.85 MG/DL (ref 0.6–1.3)
DIFFERENTIAL METHOD BLD: ABNORMAL
EOSINOPHIL # BLD: 0.4 K/UL (ref 0–0.4)
EOSINOPHIL NFR BLD: 4 % (ref 0–5)
ERYTHROCYTE [DISTWIDTH] IN BLOOD BY AUTOMATED COUNT: 15.1 % (ref 11.6–14.5)
GLUCOSE BLD STRIP.AUTO-MCNC: 148 MG/DL (ref 70–110)
GLUCOSE BLD STRIP.AUTO-MCNC: 152 MG/DL (ref 70–110)
GLUCOSE BLD STRIP.AUTO-MCNC: 208 MG/DL (ref 70–110)
GLUCOSE BLD STRIP.AUTO-MCNC: 249 MG/DL (ref 70–110)
GLUCOSE BLD STRIP.AUTO-MCNC: 294 MG/DL (ref 70–110)
GLUCOSE SERPL-MCNC: 133 MG/DL (ref 74–99)
HCT VFR BLD AUTO: 27.4 % (ref 36–48)
HGB BLD-MCNC: 8.3 G/DL (ref 13–16)
LYMPHOCYTES # BLD: 1.6 K/UL (ref 0.9–3.6)
LYMPHOCYTES NFR BLD: 18 % (ref 21–52)
MCH RBC QN AUTO: 27.5 PG (ref 24–34)
MCHC RBC AUTO-ENTMCNC: 30.3 G/DL (ref 31–37)
MCV RBC AUTO: 90.7 FL (ref 74–97)
MONOCYTES # BLD: 0.8 K/UL (ref 0.05–1.2)
MONOCYTES NFR BLD: 9 % (ref 3–10)
NEUTS SEG # BLD: 6.2 K/UL (ref 1.8–8)
NEUTS SEG NFR BLD: 69 % (ref 40–73)
PLATELET # BLD AUTO: 169 K/UL (ref 135–420)
PMV BLD AUTO: 11.3 FL (ref 9.2–11.8)
POTASSIUM SERPL-SCNC: 3.3 MMOL/L (ref 3.5–5.5)
RBC # BLD AUTO: 3.02 M/UL (ref 4.7–5.5)
SODIUM SERPL-SCNC: 150 MMOL/L (ref 136–145)
WBC # BLD AUTO: 9 K/UL (ref 4.6–13.2)

## 2019-10-24 PROCEDURE — 92526 ORAL FUNCTION THERAPY: CPT

## 2019-10-24 PROCEDURE — 74011636637 HC RX REV CODE- 636/637: Performed by: FAMILY MEDICINE

## 2019-10-24 PROCEDURE — 65660000000 HC RM CCU STEPDOWN

## 2019-10-24 PROCEDURE — 74011250637 HC RX REV CODE- 250/637: Performed by: HOSPITALIST

## 2019-10-24 PROCEDURE — 80048 BASIC METABOLIC PNL TOTAL CA: CPT

## 2019-10-24 PROCEDURE — 74011636637 HC RX REV CODE- 636/637: Performed by: HOSPITALIST

## 2019-10-24 PROCEDURE — 82962 GLUCOSE BLOOD TEST: CPT

## 2019-10-24 PROCEDURE — 74011250637 HC RX REV CODE- 250/637: Performed by: INTERNAL MEDICINE

## 2019-10-24 PROCEDURE — 77030040831 HC BAG URINE DRNG MDII -A

## 2019-10-24 PROCEDURE — 74011250637 HC RX REV CODE- 250/637: Performed by: FAMILY MEDICINE

## 2019-10-24 PROCEDURE — 74011250636 HC RX REV CODE- 250/636: Performed by: HOSPITALIST

## 2019-10-24 PROCEDURE — 36415 COLL VENOUS BLD VENIPUNCTURE: CPT

## 2019-10-24 PROCEDURE — 77010033678 HC OXYGEN DAILY

## 2019-10-24 PROCEDURE — 74011000258 HC RX REV CODE- 258: Performed by: HOSPITALIST

## 2019-10-24 PROCEDURE — 85025 COMPLETE CBC W/AUTO DIFF WBC: CPT

## 2019-10-24 RX ORDER — POTASSIUM CHLORIDE 7.45 MG/ML
10 INJECTION INTRAVENOUS
Status: COMPLETED | OUTPATIENT
Start: 2019-10-24 | End: 2019-10-24

## 2019-10-24 RX ORDER — INSULIN GLARGINE 100 [IU]/ML
5 INJECTION, SOLUTION SUBCUTANEOUS
Status: DISCONTINUED | OUTPATIENT
Start: 2019-10-24 | End: 2019-10-30 | Stop reason: HOSPADM

## 2019-10-24 RX ORDER — FUROSEMIDE 10 MG/ML
40 INJECTION INTRAMUSCULAR; INTRAVENOUS DAILY
Status: DISCONTINUED | OUTPATIENT
Start: 2019-10-25 | End: 2019-10-30

## 2019-10-24 RX ORDER — FUROSEMIDE 10 MG/ML
40 INJECTION INTRAMUSCULAR; INTRAVENOUS 2 TIMES DAILY
Status: DISCONTINUED | OUTPATIENT
Start: 2019-10-24 | End: 2019-10-24

## 2019-10-24 RX ORDER — INSULIN LISPRO 100 [IU]/ML
4 INJECTION, SOLUTION INTRAVENOUS; SUBCUTANEOUS
Status: DISCONTINUED | OUTPATIENT
Start: 2019-10-24 | End: 2019-10-30 | Stop reason: HOSPADM

## 2019-10-24 RX ORDER — PHENAZOPYRIDINE HYDROCHLORIDE 100 MG/1
100 TABLET, FILM COATED ORAL EVERY 12 HOURS
Status: COMPLETED | OUTPATIENT
Start: 2019-10-24 | End: 2019-10-25

## 2019-10-24 RX ORDER — ATROPA BELLADONNA AND OPIUM 16.2; 3 MG/1; MG/1
1 SUPPOSITORY RECTAL
Status: DISCONTINUED | OUTPATIENT
Start: 2019-10-25 | End: 2019-10-30 | Stop reason: HOSPADM

## 2019-10-24 RX ADMIN — NYSTATIN: 100000 POWDER TOPICAL at 21:26

## 2019-10-24 RX ADMIN — ATROPA BELLADONNA AND OPIUM 1 SUPPOSITORY: 16.2; 3 SUPPOSITORY RECTAL at 08:19

## 2019-10-24 RX ADMIN — POTASSIUM BICARBONATE 20 MEQ: 782 TABLET, EFFERVESCENT ORAL at 08:19

## 2019-10-24 RX ADMIN — ISOSORBIDE DINITRATE 20 MG: 20 TABLET ORAL at 21:25

## 2019-10-24 RX ADMIN — NYSTATIN: 100000 POWDER TOPICAL at 08:20

## 2019-10-24 RX ADMIN — METOPROLOL TARTRATE 25 MG: 25 TABLET, FILM COATED ORAL at 21:25

## 2019-10-24 RX ADMIN — Medication 10 ML: at 07:39

## 2019-10-24 RX ADMIN — POTASSIUM CHLORIDE 10 MEQ: 7.46 INJECTION, SOLUTION INTRAVENOUS at 07:29

## 2019-10-24 RX ADMIN — Medication 10 ML: at 21:32

## 2019-10-24 RX ADMIN — NYSTATIN 500000 UNITS: 100000 SUSPENSION ORAL at 17:49

## 2019-10-24 RX ADMIN — METOPROLOL TARTRATE 25 MG: 25 TABLET, FILM COATED ORAL at 09:00

## 2019-10-24 RX ADMIN — POTASSIUM CHLORIDE 10 MEQ: 7.46 INJECTION, SOLUTION INTRAVENOUS at 08:21

## 2019-10-24 RX ADMIN — PHENAZOPYRIDINE HYDROCHLORIDE 100 MG: 100 TABLET ORAL at 03:59

## 2019-10-24 RX ADMIN — INSULIN LISPRO 3 UNITS: 100 INJECTION, SOLUTION INTRAVENOUS; SUBCUTANEOUS at 00:07

## 2019-10-24 RX ADMIN — NYSTATIN 500000 UNITS: 100000 SUSPENSION ORAL at 08:18

## 2019-10-24 RX ADMIN — INSULIN LISPRO 6 UNITS: 100 INJECTION, SOLUTION INTRAVENOUS; SUBCUTANEOUS at 23:38

## 2019-10-24 RX ADMIN — ISOSORBIDE DINITRATE 20 MG: 20 TABLET ORAL at 08:20

## 2019-10-24 RX ADMIN — INSULIN LISPRO 6 UNITS: 100 INJECTION, SOLUTION INTRAVENOUS; SUBCUTANEOUS at 17:57

## 2019-10-24 RX ADMIN — INSULIN GLARGINE 5 UNITS: 100 INJECTION, SOLUTION SUBCUTANEOUS at 21:31

## 2019-10-24 RX ADMIN — PIPERACILLIN SODIUM,TAZOBACTAM SODIUM 2.25 G: 2; .25 INJECTION, POWDER, FOR SOLUTION INTRAVENOUS at 07:29

## 2019-10-24 RX ADMIN — FAMOTIDINE 20 MG: 20 TABLET ORAL at 08:20

## 2019-10-24 RX ADMIN — APIXABAN 2.5 MG: 2.5 TABLET, FILM COATED ORAL at 21:25

## 2019-10-24 RX ADMIN — NYSTATIN 500000 UNITS: 100000 SUSPENSION ORAL at 12:29

## 2019-10-24 RX ADMIN — PIPERACILLIN SODIUM,TAZOBACTAM SODIUM 2.25 G: 2; .25 INJECTION, POWDER, FOR SOLUTION INTRAVENOUS at 17:48

## 2019-10-24 RX ADMIN — NYSTATIN: 100000 POWDER TOPICAL at 17:49

## 2019-10-24 RX ADMIN — POTASSIUM BICARBONATE 20 MEQ: 782 TABLET, EFFERVESCENT ORAL at 21:26

## 2019-10-24 RX ADMIN — PIPERACILLIN SODIUM,TAZOBACTAM SODIUM 2.25 G: 2; .25 INJECTION, POWDER, FOR SOLUTION INTRAVENOUS at 23:35

## 2019-10-24 RX ADMIN — ATORVASTATIN CALCIUM 40 MG: 20 TABLET, FILM COATED ORAL at 08:19

## 2019-10-24 RX ADMIN — APIXABAN 2.5 MG: 2.5 TABLET, FILM COATED ORAL at 08:19

## 2019-10-24 RX ADMIN — NYSTATIN 500000 UNITS: 100000 SUSPENSION ORAL at 21:26

## 2019-10-24 RX ADMIN — PHENAZOPYRIDINE HYDROCHLORIDE 100 MG: 100 TABLET ORAL at 21:00

## 2019-10-24 RX ADMIN — PIPERACILLIN SODIUM,TAZOBACTAM SODIUM 2.25 G: 2; .25 INJECTION, POWDER, FOR SOLUTION INTRAVENOUS at 00:07

## 2019-10-24 RX ADMIN — FUROSEMIDE 40 MG: 10 INJECTION, SOLUTION INTRAMUSCULAR; INTRAVENOUS at 08:19

## 2019-10-24 RX ADMIN — PIPERACILLIN SODIUM,TAZOBACTAM SODIUM 2.25 G: 2; .25 INJECTION, POWDER, FOR SOLUTION INTRAVENOUS at 12:29

## 2019-10-24 RX ADMIN — INSULIN LISPRO 9 UNITS: 100 INJECTION, SOLUTION INTRAVENOUS; SUBCUTANEOUS at 12:29

## 2019-10-24 RX ADMIN — ISOSORBIDE DINITRATE 20 MG: 20 TABLET ORAL at 17:48

## 2019-10-24 NOTE — PROGRESS NOTES
Problem: Falls - Risk of  Goal: *Absence of Falls  Description  Document Donata Carrel Fall Risk and appropriate interventions in the flowsheet.   Outcome: Progressing Towards Goal  Note:   Fall Risk Interventions:  Mobility Interventions: Patient to call before getting OOB    Mentation Interventions: Adequate sleep, hydration, pain control    Medication Interventions: Patient to call before getting OOB    Elimination Interventions: Bed/chair exit alarm

## 2019-10-24 NOTE — PROGRESS NOTES
DC Plan: Discharge to [facility] tomorrow for LTC, pt will need medical transport    Chart reviewed. Pt admitted to tele by hospitalist. Anticipate dc tomorrow. Spoke with pts son and daughter on phone and updated them on dc plan, they verbalized understanding. Yosef @ [facility] made aware and can accept pt tomorrow.     Ovi RamirezRk Son   [phone]   Sil Deluna Daughter   [phone]   Genaro Juan Luis Daughter  [phone]

## 2019-10-24 NOTE — PROGRESS NOTES
0315 Pt screaming in pain  when urinating. Pt yells \"hey lady I'm trying to pee\" and continues to scream. Nurse spoke with Dr. Aren Maharaj about the pt's pain and suggested pyridium. Nurse reported Pt's BUN of 49 and Cr of 2.86.  Dr. Aren Maharaj request nurse speaks with pharmacist about risks of pyridium in this pt.     0330 Orders received to give pyridium 100mg q12h for 2 days and to monitor BMP for 2 days while on pyridium. There are already orders for daily BMP.     0530 Condom cath placed to prevent worsening of excoriation on scrotum and penis. [address] with daughter Dwight Segura at bedside. Daughter reported concerns about pt screaming when urinating. Nurse informed daughter that the  Is aware and treatments have been implemented overnight. Jordan Combs 0730 Bedside shift change report given to MERCED Siddiqui RN (oncoming nurse) by Lupillo Mixon. Flaca Gonzales RN (offgoing nurse). Report included the following information SBAR, Kardex, Intake/Output and MAR.

## 2019-10-24 NOTE — PROGRESS NOTES
Hospitalist Progress Note    Patient: Olivia Arevalo MRN: [id number]  CSN: [id number]    YOB: 1935  Age: 81 y.o. Sex: male    DOA: 10/16/2019 LOS:  LOS: 8 days          Chief Complaint: Ac resp failure      Assessment/Plan        80 yo CVA and dementia patient admitted to ICU for ac resp failure from nursing home  Improved and weaned from bipap, now on tele floor  Concern about intermittent tolerance of pureed with advanced dementia and inability to follow cues-he has eaten and tolerated current diet    This am his sodium is up to 150     Had nuclear stress test -showed ischemia-he is on eliquis at NH, resumed      Acute resp failure -- improved-acute on chronic systolic and diastolic CHF-after IV lasix change to PO     Dysphagia-modified diet     Advanced dementia-full code at familys request     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) I50.9    Dementia ([address] F03.90    History of CVA (cerebrovascular accident) Z86.73    Insulin dependent diabetes mellitus with complications ([address] I58.8, Z79.4    UTI (urinary tract infection) N39.0    Acute respiratory failure ([address] J96.00    Acute on chronic combined systolic and diastolic CHF (congestive heart failure) ([address] I50.43    [facility]non[facility]ST elevated [facility] I21.[facility]    CKD (chronic kidney disease) stage 4, GFR 15-29 ml/min ([facility]) N18.4       Subjective:    Bladder spasms  Daughter notes pain and grimacing when urinates  Patient too confused to participate in ROS    Review of systems:    Nurse and dtr at bedside this am  He has eaten  No new issues outside of above      Vital signs/Intake and Output:  Visit Vitals  /59 (BP 1 Location: Right arm, BP Patient Position: At rest;Supine)   Pulse 62   Temp 98.6 °F (37 °C)   Resp 18   Ht 5' 8\" (1.727 m)   Wt 101.6 kg (224 lb)   SpO2 100%   BMI 34.06 kg/m²     Current Shift:  10/24 0701 - 10/24 1900  In: 120 [P.O.:120]  Out: -   Last three shifts:  10/22 1901 - 10/24 0700  In: 360 [P.O.:360]  Out: -     Exam:    General: elderly demented BM, NAD  Head/Neck: NCAT, supple, No masses, No lymphadenopathy  CVS:Regular rate and rhythm, no M/R/G, S1/S2 heard, no thrill  Lungs:Clear to auscultation bilaterally, no wheezes, rhonchi, or rales  Abdomen: Soft, Nontender, No distention, Normal Bowel sounds, No hepatomegaly  Extremities:chronic stasis edema LE BL  Neuro:grossly normal , follows commands  Psych:appropriate                Labs: Results:       Chemistry Recent Labs     10/24/19  0400 10/23/19  0045 10/22/19  0430   * 182* 116*   * 148* 148*   K 3.3* 3.4* 3.2*    109 108   CO2 35* 34* 35*   BUN 45* 49* 50*   CREA 2.85* 2.86* 2.77*   CA 8.5 8.6 8.7   AGAP 4 5 5   BUCR 16 17 18      CBC w/Diff Recent Labs     10/24/19  0400 10/23/19  0045 10/22/19  0430   WBC 9.0 8.8 7.6   RBC 3.02* 3.25* 3.00*   HGB 8.3* 8.8* 8.2*   HCT 27.4* 29.4* 27.0*    161 149   GRANS 69 73 68   LYMPH 18* 13* 18*   EOS 4 4 5      Cardiac Enzymes No results for input(s): CPK, CKND1, ISIDORO in the last 72 hours. No lab exists for component: CKRMB, TROIP   Coagulation No results for input(s): PTP, INR, APTT, INREXT in the last 72 hours.     Lipid Panel No results found for: CHOL, CHOLPOCT, CHOLX, CHLST, CHOLV, L585120, HDL, HDLP, LDL, LDLC, DLDLP, 115118, VLDLC, VLDL, TGLX, TRIGL, TRIGP, TGLPOCT, CHHD, CHHDX   BNP No results for input(s): BNPP in the last 72 hours. Liver Enzymes No results for input(s): TP, ALB, TBIL, AP, SGOT, GPT in the last 72 hours.     No lab exists for component: DBIL   Thyroid Studies Lab Results   Component Value Date/Time    TSH 0.45 10/18/2019 04:10 AM        Procedures/imaging: see electronic medical records for all procedures/Xrays and details which were not copied into this note but were reviewed prior to creation of Omar Ahn MD

## 2019-10-24 NOTE — PROGRESS NOTES
9437 Assumed care of the patient from [address] [place] [address] (offgoing Nurse). Pt denies any pain or discomfort at this moment. bed in low position, call bell within reach. 1900 Patient had an uneventful shift and remained stable. Purposeful hourly rounding completed throughout the shift, NAD noted at this time, and patient is resting quietly in bed. No concerns or requests voiced    1940 Bedside and Verbal shift change report given to [facility]  - ISAIAS RN(oncoming nurse) by Sunita Sanders RN (offgoing nurse). Report included the following information SBAR, Kardex, Intake/Output, MAR and Cardiac Rhythm .

## 2019-10-24 NOTE — PROGRESS NOTES
Problem: Falls - Risk of  Goal: *Absence of Falls  Description  Document Andi Osborne Fall Risk and appropriate interventions in the flowsheet. Outcome: Progressing Towards Goal  Note:   Fall Risk Interventions:  Mobility Interventions: Patient to call before getting OOB, Bed/chair exit alarm, Communicate number of staff needed for ambulation/transfer, PT Consult for mobility concerns    Mentation Interventions: Adequate sleep, hydration, pain control, Bed/chair exit alarm, Door open when patient unattended, Reorient patient, Room close to nurse's station, Toileting rounds    Medication Interventions: Patient to call before getting OOB, Teach patient to arise slowly, Bed/chair exit alarm    Elimination Interventions: Bed/chair exit alarm, Call light in reach, Patient to call for help with toileting needs, Toileting schedule/hourly rounds              Problem: Patient Education: Go to Patient Education Activity  Goal: Patient/Family Education  Outcome: Progressing Towards Goal     Problem: Pressure Injury - Risk of  Goal: *Prevention of pressure injury  Description  Document [place] Scale and appropriate interventions in the flowsheet. Outcome: Progressing Towards Goal  Note:   Pressure Injury Interventions:  Sensory Interventions: Keep linens dry and wrinkle-free, Maintain/enhance activity level, Minimize linen layers, Monitor skin under medical devices, Assess need for specialty bed    Moisture Interventions: Absorbent underpads, Apply protective barrier, creams and emollients, Assess need for specialty bed, Check for incontinence Q2 hours and as needed, Limit adult briefs, Minimize layers    Activity Interventions: PT/OT evaluation, Pressure redistribution bed/mattress(bed type), Assess need for specialty bed    Mobility Interventions: Assess need for specialty bed, HOB 30 degrees or less, Pressure redistribution bed/mattress (bed type), PT/OT evaluation, Turn and reposition approx.  every two hours(pillow and wedges)    Nutrition Interventions: Document food/fluid/supplement intake    Friction and Shear Interventions: Apply protective barrier, creams and emollients, HOB 30 degrees or less, Lift sheet, Minimize layers, Lift team/patient mobility team, Transferring/repositioning devices                Problem: Patient Education: Go to Patient Education Activity  Goal: Patient/Family Education  Outcome: Progressing Towards Goal     Problem: Pain  Goal: *Control of Pain  Outcome: Progressing Towards Goal     Problem: Patient Education: Go to Patient Education Activity  Goal: Patient/Family Education  Outcome: Progressing Towards Goal     Problem: Heart Failure: Day 2  Goal: Activity/Safety  Outcome: Progressing Towards Goal  Goal: Consults, if ordered  Outcome: Progressing Towards Goal  Goal: Diagnostic Test/Procedures  Outcome: Progressing Towards Goal  Goal: Nutrition/Diet  Outcome: Progressing Towards Goal  Goal: Discharge Planning  Outcome: Progressing Towards Goal  Goal: Medications  Outcome: Progressing Towards Goal  Goal: Respiratory  Outcome: Progressing Towards Goal  Goal: Treatments/Interventions/Procedures  Outcome: Progressing Towards Goal  Goal: *Oxygen saturation within defined limits  Outcome: Progressing Towards Goal  Goal: *Hemodynamically stable  Outcome: Progressing Towards Goal  Goal: *Optimal pain control at patient's stated goal  Outcome: Progressing Towards Goal  Goal: *Anxiety reduced or absent  Outcome: Progressing Towards Goal  Goal: *Demonstrates progressive activity  Outcome: Progressing Towards Goal     Problem: Patient Education: Go to Patient Education Activity  Goal: Patient/Family Education  Outcome: Progressing Towards Goal     Problem: Patient Education: Go to Patient Education Activity  Goal: Patient/Family Education  Outcome: Progressing Towards Goal     Problem: Gas Exchange - Impaired  Goal: *Absence of hypoxia  Outcome: Progressing Towards Goal     Problem: Patient Education: Go to Patient Education Activity  Goal: Patient/Family Education  Outcome: Progressing Towards Goal     Problem: Dysphagia (Adult)  Goal: *Speech Goal: (INSERT TEXT)  Outcome: Progressing Towards Goal     Problem: Patient Education: Go to Patient Education Activity  Goal: Patient/Family Education  Outcome: Progressing Towards Goal     Problem: Aspiration - Risk of  Goal: *Absence of aspiration  Outcome: Progressing Towards Goal     Problem: Patient Education: Go to Patient Education Activity  Goal: Patient/Family Education  Outcome: Progressing Towards Goal     Problem: Nutrition Deficit  Goal: *Optimize nutritional status  Outcome: Progressing Towards Goal

## 2019-10-24 NOTE — PROGRESS NOTES
Problem: Dysphagia (Adult)  Goal: *Acute Goals and Plan of Care (Insert Text)  Description  Recommendations:  Diet: Puree/NECTAR thick liquid with NO STRAWS  Meds: As tolerated  Aspiration Precautions  Oral Care TID  Other: Feeding assistance    Goals:  Patient will:  1. Tolerate PO trials with 0 s/s overt distress in 4/5 trials  2. Utilize compensatory swallow strategies/maneuvers (decrease bite/sip, size/rate, alt. liq/sol) with min cues in 4/5 trials  3. Complete an objective swallow study (i.e., MBSS) to assess swallow integrity, r/o aspiration, and determine of safest LRD, min A - goal met 10/21   Outcome: Progressing Towards Goal    SPEECH LANGUAGE PATHOLOGY DYSPHAGIA TREATMENT    Patient: Bhakti Khanna (35 y.o. male)  Date: 10/24/2019  Diagnosis: CHF exacerbation ([facility]) [I50.9]  Acute respiratory failure ([facility]) [J96.00]  Acute on chronic combined systolic and diastolic CHF (congestive heart failure) ([facility]) [I50.43] Acute respiratory failure ([facility])  Procedure(s) (LRB):  ESOPHAGOGASTRODUODENOSCOPY (EGD) (N/A) 7 Days Post-Op  Precautions: Aspiration Fall  PLOF: Independent     ASSESSMENT:  Followed up with dysphagia intervention. MBSS completed 10/21 without aspiration, rec: puree/NTL via cup only. This day, A&O to self and city, disoriented to being in the hospital. Patient accepted nectar-thick liquid via cup sip and puree trials with 0 overt s/sx of aspiration. Continues to demo delayed swallow initiation. Recommend patient continue puree/NECTAR thick liquid diet with no straws, small sips/bites, feeding assistance, strict aspiration precautions. Pills should be presented whole in puree or as tolerated. Patient remains at high risk for aspiration due to limited safety awareness, limited command following and advanced dementia. SLP will continue to follow as indicated. Progression toward goals:  ?         Improving appropriately and progressing toward goals  ?          Improving slowly and progressing toward goals  ?         Not making progress toward goals and plan of care will be adjusted     PLAN:  Recommendations and Planned Interventions:  Puree/NTL  Patient continues to benefit from skilled intervention to address the above impairments. Continue treatment per established plan of care. Discharge Recommendations:  Skilled Nursing Facility     SUBJECTIVE:   Patient stated I can play any instrument. OBJECTIVE:   Cognitive and Communication Status:  Neurologic State: Alert  Orientation Level: Oriented to person, Disoriented to time, Disoriented to situation, Disoriented to place  Cognition: Impaired decision making, Memory loss, No command following, Poor safety awareness  Perception: Appears intact  Perseveration: Perseverates during conversation  Safety/Judgement: Decreased awareness of environment, Decreased awareness of need for assistance, Decreased awareness of need for safety, Decreased insight into deficits  Dysphagia Treatment:  Oral Assessment:  Oral Assessment  Labial: No impairment  Dentition: Edentulous  Oral Hygiene: Fair  Lingual: Decreased strength  Velum: No impairment  Mandible: No impairment  P.O. Trials:   Patient Position: HOB 60   Vocal quality prior to P.O.: No impairment   Consistency Presented: Thin liquid, Nectar thick liquid, Puree   How Presented: SLP-fed/presented, Cup/sip, Spoon, Straw       Bolus Acceptance: Impaired   Bolus Formation/Control: Impaired   Type of Impairment: Delayed, Mastication, Incomplete   Propulsion: Discoordination, Delayed (# of seconds)   Oral Residue: Greater than 50% of bolus, Pocketing   Initiation of Swallow: Delayed (# of seconds)   Laryngeal Elevation: Functional   Aspiration Signs/Symptoms: Clear throat   Pharyngeal Phase Characteristics: Audible swallow   Effective Modifications:  Alternate liquids/solids, Small sips and bites   Cues for Modifications: Maximal         Oral Phase Severity: Moderate   Pharyngeal Phase Severity : Mild     PAIN:  Pain level pre-treatment: 0/10   Pain level post-treatment: 0/10     After treatment:   ?              Patient left in no apparent distress sitting up in chair  ? Patient left in no apparent distress in bed  ? Call bell left within reach  ? Nursing notified  ? Family present  ? Caregiver present  ? Bed alarm activated      COMMUNICATION/EDUCATION:   ? Aspiration precautions; swallow safety; compensatory techniques  ? Patient unable to participate in education; education ongoing with staff  ? Posted safety precautions in patient's room.   ? Oral-motor/laryngeal strengthening exercises      FLAQUITA Zamora  Time Calculation: 14 mins

## 2019-10-24 NOTE — PROGRESS NOTES
[facility]: [phone] ([phone])  [facility]: [phone]   [facility]: [phone]    Patient Name: Alicia Holland  YOB: 1935    Date of Initial Consult: 10/18/2019, follow up 10/21/2019, 10/24/2019   Reason for Consult: care decisions   Requesting Provider: Dr Lucia Carranza   Primary Care Physician: Liv Thorne MD      SUMMARY:   Alicia Holland is a 81y.o. year old with a past history of dementia, hypertension, CVA, CKD, CAD, diabetes who was admitted on 10/16/2019 from [address] [place] [address] with a diagnosis of acute exacerbation  Current medical issues leading to Palliative Medicine involvement include: 81year old male who is a long term care  nursing home patient with significant co morbidities. Palliative care is consulted for support and goals of care. 10/21/2019 follow up on Mr Dior, for MBS events of last night noted. Daughter at bedside. Clinical overview given. No change in goals of care full code with full aggressive interventions. Has previously shared if he needed a PEG tube, family would be accepting of this. 10/24/2019 alert, talking this am. When we saw very comfortable. Singing \" You are my sunshine\". CNA feeding him. Several conversations with daughter Francisca Chen concerning goals of care; full code with full interventions. Discussed clinical overview with son and daughter per their request. No change in goals of care. PALLIATIVE DIAGNOSES:   1. Advanced care plan discussion   2. Acute on chronic CHF  3. Dementia   4. Debility        PLAN:   1. 10/24/2019 follow up along with SHARON Wilson and Ms Barclay, RN. Mr Linda Mac was sitting up in bed. CNA feeding him and he seemed to tolerate well. Alert and talkative denied pain when we saw. Sang a song for us. Daughter Francisca Chen called us earlier this am concerned about pain when he urinates.  Has been treated for UTI  She had shared with us this seemed to be a chronic problem even at the nursing home. Has B&O supp ordered by attending team. Discussed with RN. Several conversations with his daughter Amanda Pugh concerning goals of care including benefits and burdens in light of chronic/ progressive disease and function debilities. Family wishes for full code with full interventions. Alternative feeding ( PEG) also discussed, including benefits and burdens, placement does not stop aspiration. Family would be accepting of PEG if this became a need. Of note patient's wife is resident of LTC with PEG. Received call from Amanda Pugh and Cam Jasmine, requesting clinical update, which we shared. Nav Morocho shared he realizes his father is \" getting older and his dementia is not getting better\". Discussed aspiration and risks going forward. They are aware if he does well possible d/c tomorrow. Goals of care not changed full code with full interventions. ( please see below for previous conversations)     2. 10/21/2019 follow up along with Ms Ning RN. Mr Cuong Duong not as alert this am, but will alert to answer some very simple questions. His daughter Amanda Pugh at bedside. Understands he will be going for MBS, due to concern over his ability to swallow safely. She has previously shared with us family would wish to proceed with PEG placement if needed. Mother had PEG placement and they feel she does very well with it. Discussed benefits and burdens of alternative feeding, including it will not stop aspiration. No change in goals of care full code with full interventions. 3. Advance care plan discussion / goals of care discussion Patient seen at bedside along with Ms Ning RN. He is alert and answering some questions, but not able to participate in his medical decisions. His daughter Kimberly Brandon and son Azeb Crum are designated MPOA's. AMD on is on file. Shantelle at bedside this am, overall medical condition discussed. Chart reviewed, including Care Everywhere.  He has been seen not only by our Palliative team in the past but also palliative colleagues at [facility]. Their notes reviewed family has wished for continuation of full code in the past. Goals of care revisited today with Shantelle. Benefits and burdens of resuscitation carefully reviewed including the interplay of CPR with chronic, debilitating disease. We asked Shantelle to consider medical decisions in particular resuscitation  as it relates to Mr. Umu Sam quality of life. Goals of care full code with full interventions. 4. CHF currently maintaining on nasal cannula. Cards and Pulmonology on board  5. Dementia lives at nursing home, knows his daughter's voice and can name her. Requires assistance for ADL's, including feeding. Seen by ST bill for [facility] with nectar thick liquids and feeding assistance. Discussed dementia progression s/s with daughter Royce . 6. Debility requires long term care at nursing facility, assistance with ADL's including feeding, he is bed bound   7. Initial consult note routed to primary continuity provider  8. Communicated plan of care with: Palliative IDT, daughter Yahir Coffey Proxy Stated Goals: Prolong life      TREATMENT PREFERENCES:   Code Status: Full Code    Advance Care Planning:  [x] [facility] Interdisciplinary Team has updated the ACP Navigator with [address] and Patient Capacity    Primary Decision [facility] ([address]):   Primary Decision Maker: Ashley Wilkinson - Son - [phone]    Primary Decision Maker: Renato Mills - Daughter - [phone]    Medical Interventions: Full interventions     Artificially Administered Nutrition: Feeding tube long-term, if indicated     Other:  As far as possible, the palliative care team has discussed with patient / health care proxy about goals of care / treatment preferences for patient.      HISTORY:     History obtained from: chart and daughter     CHIEF COMPLAINT: respiratory distress    HPI/SUBJECTIVE:    The patient is: [] Verbal and participatory  [x] Non-participatory due to: confusion   Please see summary     Clinical Pain Assessment (nonverbal scale for nonverbal patients): Clinical Pain Assessment  Severity: 0     Activity (Movement): Lying quietly, normal position    Duration: for how long has pt been experiencing pain (e.g., 2 days, 1 month, years)  Frequency: how often pain is an issue (e.g., several times per day, once every few days, constant)     FUNCTIONAL ASSESSMENT:     Palliative Performance Scale (PPS):  PPS: 40    ECOG  ECOG Status : Completely disabled     PSYCHOSOCIAL/SPIRITUAL SCREENING:      Any spiritual / Sabianist concerns: unable to assess for patient  [] Yes /  [] No    Caregiver Burnout:  [] Yes /  [x] No /  [] No Caregiver Present      Anticipatory grief assessment: unable to assess for patient   [] Normal  / [] Maladaptive        REVIEW OF SYSTEMS:     Positive and pertinent negative findings in ROS are noted above in HPI. The following systems were [] reviewed / [x] unable to be reviewed as noted in HPI  Other findings are noted below. Systems: constitutional, ears/nose/mouth/throat, respiratory, gastrointestinal, genitourinary, musculoskeletal, integumentary, neurologic, psychiatric, endocrine. Positive findings noted below. Modified ESAS Completed by: provider   Fatigue: 3 Drowsiness: 0     Pain: 0   Anxiety: 0       Dyspnea: 0           Stool Occurrence(s): 1        PHYSICAL EXAM:     Wt Readings from Last 3 Encounters:   10/24/19 101.6 kg (224 lb)   08/10/19 107.5 kg (237 lb 1.6 oz)     Blood pressure 119/59, pulse 62, temperature 98.6 °F (37 °C), resp. rate 18, height 5' 8\" (1.727 m), weight 101.6 kg (224 lb), SpO2 100 %.   Pain:  Pain Scale 1: Numeric (0 - 10)  Pain Intensity 1: 0                 Last bowel movement: x 2 yesterday     Constitutional: alert, interactive this am, singing in NAD  Respiratory: breathing not labored,   Skin: warm, dry  Neurologic: alert, singing, answers simple questions, and follows commands  oriented x 2            HISTORY:     Principal Problem:    Acute respiratory failure ([facility] (10/16/2019)    Active Problems:    UTI (urinary tract infection) (8/7/2019)      Acute on chronic combined systolic and diastolic CHF (congestive heart failure) ([facility] (10/16/2019)      NSTEMI (non-ST elevated myocardial infarction) ([facility] (10/17/2019)      CKD (chronic kidney disease) stage 4, GFR 15-29 ml/min ([facility] (10/22/2019)      Past Medical History:   Diagnosis Date    CAD (coronary artery disease)     Chronic kidney disease     Dementia ([facility]     Diabetes ([facility]     Hypertension     Stroke ([facility]       History reviewed. No pertinent surgical history. History reviewed. No pertinent family history. History reviewed, no pertinent family history.   Social History     Tobacco Use    Smoking status: Unknown If Ever Smoked   Substance Use Topics    Alcohol use: Not Currently     Allergies   Allergen Reactions    [place] Juice Unknown (comments)    Pork Derived (Porcine) Unknown (comments)    Tomato Unknown (comments)     Pt unable to answer, dementia        Current Facility-Administered Medications   Medication Dose Route Frequency    phenazopyridine (PYRIDIUM) tablet 100 mg  100 mg Oral Q12H    furosemide (LASIX) injection 40 mg  40 mg IntraVENous BID    opium-belladonna (B&O 15-A) 16.2-30 mg suppository 1 Suppository  1 Suppository Rectal Q8H PRN    nystatin (MYCOSTATIN) 100,000 unit/gram powder   Topical TID    metoprolol tartrate (LOPRESSOR) tablet 25 mg  25 mg Oral Q12H    apixaban (ELIQUIS) tablet 2.5 mg  2.5 mg Oral BID    potassium bicarb-citric acid (EFFER-K) tablet 20 mEq  20 mEq Oral BID    isosorbide dinitrate (ISORDIL) tablet 20 mg  20 mg Oral TID    famotidine (PEPCID) tablet 20 mg  20 mg Oral DAILY    bisacodyl (DULCOLAX) tablet 5 mg  5 mg Oral DAILY PRN    bisacodyl (DULCOLAX) suppository 10 mg  10 mg Rectal DAILY PRN    insulin lispro (HUMALOG) injection SubCUTAneous Q6H    piperacillin-tazobactam (ZOSYN) 2.25 g in 0.9% sodium chloride (MBP/ADV) 50 mL MBP  2.25 g IntraVENous Q6H    perflutren lipid microspheres (DEFINITY) in NS bolus IV  1 mL IntraVENous PRN    nystatin (MYCOSTATIN) 100,000 unit/mL oral suspension 500,000 Units  500,000 Units Oral QID    atorvastatin (LIPITOR) tablet 40 mg  40 mg Oral DAILY    albuterol-ipratropium (DUO-NEB) 2.5 MG-0.5 MG/3 ML  3 mL Nebulization Q6H PRN    sodium chloride (NS) flush 5-10 mL  5-10 mL IntraVENous PRN    sodium chloride (NS) flush 5-40 mL  5-40 mL IntraVENous Q8H    glucose chewable tablet 16 g  16 g Oral PRN    glucagon (GLUCAGEN) injection 1 mg  1 mg IntraMUSCular PRN    dextrose 10% infusion 125-250 mL  125-250 mL IntraVENous PRN    hydrALAZINE (APRESOLINE) 20 mg/mL injection 10 mg  10 mg IntraVENous Q6H PRN        LAB AND IMAGING FINDINGS:     Lab Results   Component Value Date/Time    WBC 9.0 10/24/2019 04:00 AM    HGB 8.3 (L) 10/24/2019 04:00 AM    PLATELET 748 96/96/9543 04:00 AM     Lab Results   Component Value Date/Time    Sodium 150 (H) 10/24/2019 04:00 AM    Potassium 3.3 (L) 10/24/2019 04:00 AM    Chloride 111 10/24/2019 04:00 AM    CO2 35 (H) 10/24/2019 04:00 AM    BUN 45 (H) 10/24/2019 04:00 AM    Creatinine 2.85 (H) 10/24/2019 04:00 AM    Calcium 8.5 10/24/2019 04:00 AM    Magnesium 2.2 10/21/2019 01:25 AM    Phosphorus 2.4 (L) 10/21/2019 01:25 AM      Lab Results   Component Value Date/Time    AST (SGOT) 20 10/21/2019 01:25 AM    Alk.  phosphatase 84 10/21/2019 01:25 AM    Protein, total 6.6 10/21/2019 01:25 AM    Albumin 2.4 (L) 10/21/2019 01:25 AM    Globulin 4.2 (H) 10/21/2019 01:25 AM     No results found for: INR, PTMR, PTP, PT1, PT2, APTT, INREXT, INREXT   Lab Results   Component Value Date/Time    Iron 32 (L) 10/20/2019 07:12 AM    TIBC 127 (L) 10/20/2019 07:12 AM    Iron % saturation 25 10/20/2019 07:12 AM      No results found for: PH, PCO2, PO2  No components found for: [place]   Lab Results   Component Value Date/Time    CK 98 10/17/2019 03:12 PM    CK - MB 4.9 (H) 10/17/2019 03:12 PM              Total time:15 minutes    Counseling / coordination time, spent as noted above: 10 minutes   > 50% counseling / coordination: yes with daughter Mary Delatorre    Prolonged service was provided for  []30 min   []75 min in face to face time in the presence of the patient, spent as noted above. Time Start:   Time End:   Note: this can only be billed with 13406 (initial) or 35756 (follow up). If multiple start / stop times, list each separately.

## 2019-10-24 NOTE — PROGRESS NOTES
Cardiology Progress Note        Patient: Griffin Lozano        Sex: male          DOA: 10/16/2019  YOB: 1935      Age:  81 y.o.        LOS:  LOS: 8 days    Patient seen and examined, chart reviewed. Assessment/Plan     Patient Active Problem List   Diagnosis Code    Acute exacerbation of CHF (congestive heart failure) ([facility]) I50.9    Dementia ([facility]) F03.90    History of CVA (cerebrovascular accident) Z86.73    Insulin dependent diabetes mellitus with complications ([facility]) J11.1, Z79.4    UTI (urinary tract infection) N39.0    Acute respiratory failure ([facility]) J96.00    Acute on chronic combined systolic and diastolic CHF (congestive heart failure) ([facility]) I50.43    NSTEMI (non-ST elevated myocardial infarction) ([facility]) I21.4    CKD (chronic kidney disease) stage 4, GFR 15-29 ml/min ([facility]) N18.4      LVEF 36-40%  Lexiscan stress test is abnormal and positve for ischemia/infarction. Plan:    Continue Metoprolol succinate 25 mg once a day. Continue Nitrates. No aspirin due to risk of bleeding with concomitant anticoagulation therapy. Continue statin. Continue Lasix to 40 mg once a day. Strict I/O  Monitor renal function  Continue management as per hospital medicine   Medical management due to underlying comorbid condition. Cardiology sign off. Subjective:    cc:  Not in any distress      REVIEW OF SYSTEMS:     General: No fevers or chills. Cardiovascular: No chest pain,No palpitations, No orthopnea, No PND, No leg swelling, No claudication  Pulmonary: Positive dyspnea. Gastrointestinal: No nausea, vomiting, bleeding  Neurology: No Dizziness    Objective:      Visit Vitals  /64   Pulse 82   Temp 99.3 °F (37.4 °C)   Resp 18   Ht 5' 8\" (1.727 m)   Wt 95.9 kg (211 lb 6.7 oz)   SpO2 100%   BMI 32.15 kg/m²     Body mass index is 32.15 kg/m². Physical Exam:  General Appearance: Comfortable, not using accessory muscles of respiration. HEENT: DELONTE. HEAD: Atraumatic  NECK: No JVD, no thyroidomeglay. CAROTIDS: No bruit  LUNGS: Clear bilaterally. HEART: S1+S2 audible, no murmur, no pericardial rub. ABD: Non-tender, BS Audible    EXT: No edema, and no cyanosis. VASCULAR EXAM: Pulses are intact.     CNS: Alert, follows simple verbal commands  Medication:  Current Facility-Administered Medications   Medication Dose Route Frequency    opium-belladonna (B&O 15-A) 16.2-30 mg suppository 1 Suppository  1 Suppository Rectal Q8H PRN    nystatin (MYCOSTATIN) 100,000 unit/gram powder   Topical TID    metoprolol tartrate (LOPRESSOR) tablet 25 mg  25 mg Oral Q12H    furosemide (LASIX) tablet 40 mg  40 mg Oral DAILY    apixaban (ELIQUIS) tablet 2.5 mg  2.5 mg Oral BID    potassium bicarb-citric acid (EFFER-K) tablet 20 mEq  20 mEq Oral BID    isosorbide dinitrate (ISORDIL) tablet 20 mg  20 mg Oral TID    famotidine (PEPCID) tablet 20 mg  20 mg Oral DAILY    bisacodyl (DULCOLAX) tablet 5 mg  5 mg Oral DAILY PRN    bisacodyl (DULCOLAX) suppository 10 mg  10 mg Rectal DAILY PRN    insulin lispro (HUMALOG) injection   SubCUTAneous Q6H    piperacillin-tazobactam (ZOSYN) 2.25 g in 0.9% sodium chloride (MBP/ADV) 50 mL MBP  2.25 g IntraVENous Q6H    perflutren lipid microspheres (DEFINITY) in NS bolus IV  1 mL IntraVENous PRN    nystatin (MYCOSTATIN) 100,000 unit/mL oral suspension 500,000 Units  500,000 Units Oral QID    atorvastatin (LIPITOR) tablet 40 mg  40 mg Oral DAILY    albuterol-ipratropium (DUO-NEB) 2.5 MG-0.5 MG/3 ML  3 mL Nebulization Q6H PRN    sodium chloride (NS) flush 5-10 mL  5-10 mL IntraVENous PRN    sodium chloride (NS) flush 5-40 mL  5-40 mL IntraVENous Q8H    sodium chloride (NS) flush 5-40 mL  5-40 mL IntraVENous PRN    glucose chewable tablet 16 g  16 g Oral PRN    glucagon (GLUCAGEN) injection 1 mg  1 mg IntraMUSCular PRN    dextrose 10% infusion 125-250 mL  125-250 mL IntraVENous PRN    hydrALAZINE (APRESOLINE) 20 mg/mL injection 10 mg  10 mg IntraVENous Q6H PRN               Lab/Data Reviewed:       Recent Labs     10/23/19  0045 10/22/19  0430 10/21/19  0125   WBC 8.8 7.6 7.3   HGB 8.8* 8.2* 7.7*   HCT 29.4* 27.0* 24.8*    149 120*     Recent Labs     10/23/19  0045 10/22/19  0430 10/21/19  0125   * 148* 146*   K 3.4* 3.2* 3.3*    108 108   CO2 34* 35* 33*   * 116* 180*   BUN 49* 50* 56*   CREA 2.86* 2.77* 2.78*   CA 8.6 8.7 8.2*         Signed By: Thanh Erickson MD     October 24, 2019

## 2019-10-25 LAB
ANION GAP SERPL CALC-SCNC: 3 MMOL/L (ref 3–18)
BASOPHILS # BLD: 0 K/UL (ref 0–0.1)
BASOPHILS # BLD: 0 K/UL (ref 0–0.1)
BASOPHILS NFR BLD: 0 % (ref 0–2)
BASOPHILS NFR BLD: 0 % (ref 0–2)
BUN SERPL-MCNC: 43 MG/DL (ref 7–18)
BUN/CREAT SERPL: 15 (ref 12–20)
CALCIUM SERPL-MCNC: 8.4 MG/DL (ref 8.5–10.1)
CHLORIDE SERPL-SCNC: 113 MMOL/L (ref 100–111)
CO2 SERPL-SCNC: 37 MMOL/L (ref 21–32)
CREAT SERPL-MCNC: 2.96 MG/DL (ref 0.6–1.3)
DIFFERENTIAL METHOD BLD: ABNORMAL
DIFFERENTIAL METHOD BLD: ABNORMAL
EOSINOPHIL # BLD: 0.3 K/UL (ref 0–0.4)
EOSINOPHIL # BLD: 0.3 K/UL (ref 0–0.4)
EOSINOPHIL NFR BLD: 4 % (ref 0–5)
EOSINOPHIL NFR BLD: 4 % (ref 0–5)
ERYTHROCYTE [DISTWIDTH] IN BLOOD BY AUTOMATED COUNT: 15.1 % (ref 11.6–14.5)
ERYTHROCYTE [DISTWIDTH] IN BLOOD BY AUTOMATED COUNT: 15.1 % (ref 11.6–14.5)
GLUCOSE BLD STRIP.AUTO-MCNC: 183 MG/DL (ref 70–110)
GLUCOSE BLD STRIP.AUTO-MCNC: 251 MG/DL (ref 70–110)
GLUCOSE BLD STRIP.AUTO-MCNC: 263 MG/DL (ref 70–110)
GLUCOSE SERPL-MCNC: 183 MG/DL (ref 74–99)
HCT VFR BLD AUTO: 24.8 % (ref 36–48)
HCT VFR BLD AUTO: 25.3 % (ref 36–48)
HCT VFR BLD AUTO: 25.5 % (ref 36–48)
HEMOCCULT STL QL: NEGATIVE
HGB BLD-MCNC: 7.5 G/DL (ref 13–16)
HGB BLD-MCNC: 7.6 G/DL (ref 13–16)
HGB BLD-MCNC: 7.6 G/DL (ref 13–16)
LYMPHOCYTES # BLD: 1.3 K/UL (ref 0.9–3.6)
LYMPHOCYTES # BLD: 1.5 K/UL (ref 0.9–3.6)
LYMPHOCYTES NFR BLD: 18 % (ref 21–52)
LYMPHOCYTES NFR BLD: 19 % (ref 21–52)
MCH RBC QN AUTO: 27.5 PG (ref 24–34)
MCH RBC QN AUTO: 27.7 PG (ref 24–34)
MCHC RBC AUTO-ENTMCNC: 29.8 G/DL (ref 31–37)
MCHC RBC AUTO-ENTMCNC: 30.2 G/DL (ref 31–37)
MCV RBC AUTO: 91.5 FL (ref 74–97)
MCV RBC AUTO: 92.4 FL (ref 74–97)
MONOCYTES # BLD: 0.5 K/UL (ref 0.05–1.2)
MONOCYTES # BLD: 0.6 K/UL (ref 0.05–1.2)
MONOCYTES NFR BLD: 6 % (ref 3–10)
MONOCYTES NFR BLD: 8 % (ref 3–10)
NEUTS SEG # BLD: 5.2 K/UL (ref 1.8–8)
NEUTS SEG # BLD: 5.5 K/UL (ref 1.8–8)
NEUTS SEG NFR BLD: 70 % (ref 40–73)
NEUTS SEG NFR BLD: 71 % (ref 40–73)
PLATELET # BLD AUTO: 155 K/UL (ref 135–420)
PLATELET # BLD AUTO: 164 K/UL (ref 135–420)
PMV BLD AUTO: 10.7 FL (ref 9.2–11.8)
PMV BLD AUTO: 10.9 FL (ref 9.2–11.8)
POTASSIUM SERPL-SCNC: 3.7 MMOL/L (ref 3.5–5.5)
RBC # BLD AUTO: 2.71 M/UL (ref 4.7–5.5)
RBC # BLD AUTO: 2.76 M/UL (ref 4.7–5.5)
SODIUM SERPL-SCNC: 153 MMOL/L (ref 136–145)
WBC # BLD AUTO: 7.4 K/UL (ref 4.6–13.2)
WBC # BLD AUTO: 7.8 K/UL (ref 4.6–13.2)

## 2019-10-25 PROCEDURE — 82962 GLUCOSE BLOOD TEST: CPT

## 2019-10-25 PROCEDURE — 74011250636 HC RX REV CODE- 250/636: Performed by: HOSPITALIST

## 2019-10-25 PROCEDURE — 74011636637 HC RX REV CODE- 636/637: Performed by: HOSPITALIST

## 2019-10-25 PROCEDURE — 74011250637 HC RX REV CODE- 250/637: Performed by: HOSPITALIST

## 2019-10-25 PROCEDURE — 85018 HEMOGLOBIN: CPT

## 2019-10-25 PROCEDURE — 82272 OCCULT BLD FECES 1-3 TESTS: CPT

## 2019-10-25 PROCEDURE — 74011250637 HC RX REV CODE- 250/637: Performed by: FAMILY MEDICINE

## 2019-10-25 PROCEDURE — 77010033678 HC OXYGEN DAILY

## 2019-10-25 PROCEDURE — 74011636637 HC RX REV CODE- 636/637: Performed by: FAMILY MEDICINE

## 2019-10-25 PROCEDURE — 74011250637 HC RX REV CODE- 250/637: Performed by: INTERNAL MEDICINE

## 2019-10-25 PROCEDURE — 92526 ORAL FUNCTION THERAPY: CPT

## 2019-10-25 PROCEDURE — 80048 BASIC METABOLIC PNL TOTAL CA: CPT

## 2019-10-25 PROCEDURE — 85025 COMPLETE CBC W/AUTO DIFF WBC: CPT

## 2019-10-25 PROCEDURE — 74011000258 HC RX REV CODE- 258: Performed by: HOSPITALIST

## 2019-10-25 PROCEDURE — 65660000000 HC RM CCU STEPDOWN

## 2019-10-25 PROCEDURE — 36415 COLL VENOUS BLD VENIPUNCTURE: CPT

## 2019-10-25 PROCEDURE — 74011000258 HC RX REV CODE- 258: Performed by: INTERNAL MEDICINE

## 2019-10-25 RX ORDER — SODIUM CHLORIDE 450 MG/100ML
75 INJECTION, SOLUTION INTRAVENOUS CONTINUOUS
Status: DISCONTINUED | OUTPATIENT
Start: 2019-10-25 | End: 2019-10-28

## 2019-10-25 RX ADMIN — INSULIN LISPRO 9 UNITS: 100 INJECTION, SOLUTION INTRAVENOUS; SUBCUTANEOUS at 17:47

## 2019-10-25 RX ADMIN — INSULIN LISPRO 9 UNITS: 100 INJECTION, SOLUTION INTRAVENOUS; SUBCUTANEOUS at 12:24

## 2019-10-25 RX ADMIN — Medication 10 ML: at 22:00

## 2019-10-25 RX ADMIN — NYSTATIN: 100000 POWDER TOPICAL at 17:48

## 2019-10-25 RX ADMIN — NYSTATIN: 100000 POWDER TOPICAL at 21:58

## 2019-10-25 RX ADMIN — Medication 10 ML: at 14:47

## 2019-10-25 RX ADMIN — ATROPA BELLADONNA AND OPIUM 1 SUPPOSITORY: 16.2; 3 SUPPOSITORY RECTAL at 08:55

## 2019-10-25 RX ADMIN — PIPERACILLIN SODIUM,TAZOBACTAM SODIUM 2.25 G: 2; .25 INJECTION, POWDER, FOR SOLUTION INTRAVENOUS at 06:07

## 2019-10-25 RX ADMIN — PHENAZOPYRIDINE HYDROCHLORIDE 100 MG: 100 TABLET ORAL at 21:56

## 2019-10-25 RX ADMIN — INSULIN LISPRO 4 UNITS: 100 INJECTION, SOLUTION INTRAVENOUS; SUBCUTANEOUS at 12:25

## 2019-10-25 RX ADMIN — PHENAZOPYRIDINE HYDROCHLORIDE 100 MG: 100 TABLET ORAL at 08:55

## 2019-10-25 RX ADMIN — PIPERACILLIN SODIUM,TAZOBACTAM SODIUM 2.25 G: 2; .25 INJECTION, POWDER, FOR SOLUTION INTRAVENOUS at 17:47

## 2019-10-25 RX ADMIN — METOPROLOL TARTRATE 25 MG: 25 TABLET, FILM COATED ORAL at 21:56

## 2019-10-25 RX ADMIN — NYSTATIN 500000 UNITS: 100000 SUSPENSION ORAL at 08:55

## 2019-10-25 RX ADMIN — ISOSORBIDE DINITRATE 20 MG: 20 TABLET ORAL at 08:55

## 2019-10-25 RX ADMIN — FUROSEMIDE 40 MG: 10 INJECTION, SOLUTION INTRAMUSCULAR; INTRAVENOUS at 08:55

## 2019-10-25 RX ADMIN — PIPERACILLIN SODIUM,TAZOBACTAM SODIUM 2.25 G: 2; .25 INJECTION, POWDER, FOR SOLUTION INTRAVENOUS at 12:24

## 2019-10-25 RX ADMIN — INSULIN LISPRO 3 UNITS: 100 INJECTION, SOLUTION INTRAVENOUS; SUBCUTANEOUS at 06:08

## 2019-10-25 RX ADMIN — SODIUM CHLORIDE 75 ML/HR: 450 INJECTION, SOLUTION INTRAVENOUS at 14:47

## 2019-10-25 RX ADMIN — APIXABAN 2.5 MG: 2.5 TABLET, FILM COATED ORAL at 21:56

## 2019-10-25 RX ADMIN — INSULIN GLARGINE 5 UNITS: 100 INJECTION, SOLUTION SUBCUTANEOUS at 21:57

## 2019-10-25 RX ADMIN — NYSTATIN: 100000 POWDER TOPICAL at 08:56

## 2019-10-25 RX ADMIN — ATORVASTATIN CALCIUM 40 MG: 20 TABLET, FILM COATED ORAL at 08:55

## 2019-10-25 RX ADMIN — METOPROLOL TARTRATE 25 MG: 25 TABLET, FILM COATED ORAL at 08:55

## 2019-10-25 RX ADMIN — FAMOTIDINE 20 MG: 20 TABLET ORAL at 08:55

## 2019-10-25 RX ADMIN — ISOSORBIDE DINITRATE 20 MG: 20 TABLET ORAL at 17:47

## 2019-10-25 RX ADMIN — ISOSORBIDE DINITRATE 20 MG: 20 TABLET ORAL at 21:56

## 2019-10-25 RX ADMIN — APIXABAN 2.5 MG: 2.5 TABLET, FILM COATED ORAL at 08:55

## 2019-10-25 RX ADMIN — INSULIN LISPRO 4 UNITS: 100 INJECTION, SOLUTION INTRAVENOUS; SUBCUTANEOUS at 17:47

## 2019-10-25 NOTE — PROGRESS NOTES
Problem: Dysphagia (Adult)  Goal: *Acute Goals and Plan of Care (Insert Text)  Description  Recommendations:  Diet: Puree/NECTAR thick liquid with NO STRAWS  Meds: As tolerated  Aspiration Precautions  Oral Care TID  Other: Feeding assistance    Goals:  Patient will:  1. Tolerate PO trials with 0 s/s overt distress in 4/5 trials  2. Utilize compensatory swallow strategies/maneuvers (decrease bite/sip, size/rate, alt. liq/sol) with min cues in 4/5 trials  3. Complete an objective swallow study (i.e., MBSS) to assess swallow integrity, r/o aspiration, and determine of safest LRD, min A - goal met 10/21   Outcome: Progressing Towards Goal    SPEECH LANGUAGE PATHOLOGY DYSPHAGIA TREATMENT    Patient: Kirsten Barraza (15 y.o. male)  Date: 10/25/2019  Diagnosis: CHF exacerbation ([facility]) [I50.9]  Acute respiratory failure ([facility]) [J96.00]  Acute on chronic combined systolic and diastolic CHF (congestive heart failure) ([facility]) [I50.43] Acute respiratory failure ([facility])  Procedure(s) (LRB):  ESOPHAGOGASTRODUODENOSCOPY (EGD) (N/A) 8 Days Post-Op  Precautions: Aspiration Fall  PLOF: SNF     ASSESSMENT:  Followed up with skilled meal assessment. MBSS completed 10/21 without aspiration, rec: puree/NTL via cup only. This day, A&O to self and city, disoriented to being in the hospital. Patient accepted nectar-thick liquid via cup sip and puree trials with 0 overt s/sx of aspiration. Continues to demo delayed swallow initiation and decreased oral bolus control/manipulation. Oral residue with puree cleared with NTL wash. Recommend patient continue puree/NECTAR thick liquid diet with no straws, small sips/bites, alternating solids/liquids, feeding assistance, strict aspiration precautions. Pills should be presented whole in puree or as tolerated. Patient remains at high risk for aspiration due to limited safety awareness, limited command following and advanced dementia. SLP will continue to follow as indicated. Progression toward goals:  ? Improving appropriately and progressing toward goals  ? Improving slowly and progressing toward goals  ? Not making progress toward goals and plan of care will be adjusted     PLAN:  Recommendations and Planned Interventions:  Puree/thin  Patient continues to benefit from skilled intervention to address the above impairments. Continue treatment per established plan of care. Discharge Recommendations:  Skilled Nursing Facility     SUBJECTIVE:   Patient stated I'd like water. OBJECTIVE:   Cognitive and Communication Status:  Neurologic State: Alert, Confused  Orientation Level: Oriented to person, Disoriented to time, Disoriented to situation, Disoriented to place  Cognition: Impaired decision making, Poor safety awareness, Decreased command following  Perception: Appears intact  Perseveration: Perseverates during conversation  Safety/Judgement: Decreased awareness of environment, Decreased awareness of need for assistance, Decreased awareness of need for safety, Decreased insight into deficits  Dysphagia Treatment:  Oral Assessment:  Oral Assessment  Labial: No impairment  Dentition: Edentulous  Oral Hygiene: Fair  Lingual: Decreased strength  Velum: No impairment  Mandible: No impairment  P.O. Trials:   Patient Position: HOB 60   Vocal quality prior to P.O.: No impairment   Consistency Presented: Thin liquid, Nectar thick liquid, Puree   How Presented: SLP-fed/presented, Cup/sip, Spoon, Straw       Bolus Acceptance: Impaired   Bolus Formation/Control: Impaired   Type of Impairment: Delayed, Mastication, Incomplete   Propulsion: Discoordination, Delayed (# of seconds)   Oral Residue: Greater than 50% of bolus, Pocketing   Initiation of Swallow: Delayed (# of seconds)   Laryngeal Elevation: Functional   Aspiration Signs/Symptoms: Clear throat   Pharyngeal Phase Characteristics: Audible swallow   Effective Modifications:  Alternate liquids/solids, Small sips and bites   Cues for Modifications: Maximal         Oral Phase Severity: Moderate   Pharyngeal Phase Severity : Mild    PAIN:  Pain level pre-treatment: 0/10   Pain level post-treatment: 0/10     After treatment:   ?              Patient left in no apparent distress sitting up in chair  ? Patient left in no apparent distress in bed  ? Call bell left within reach  ? Nursing notified  ? Family present  ? Caregiver present  ? Bed alarm activated      COMMUNICATION/EDUCATION:   ? Aspiration precautions; swallow safety; compensatory techniques  ? Patient unable to participate in education; education ongoing with staff  ? Posted safety precautions in patient's room.   ? Oral-motor/laryngeal strengthening exercises      Tory Valentin SLP  Time Calculation: 15 mins

## 2019-10-25 NOTE — PROGRESS NOTES
3943 Assumed care of the patient from DEEPA [facility] (offgoing Nurse). Pt Oriented to self. Pt denies any pain or discomfort at this moment. bed in low position, call bell within reach. 1025 Stool sample collected and sent to lab.    1900 Patient had an uneventful shift and remained stable. Purposeful hourly rounding completed throughout the shift, NAD noted at this time, and patient is resting quietly in bed. No concerns or requests voiced    1905 Patient had an uneventful shift and remained stable. Purposeful hourly rounding completed throughout the shift, NAD noted at this time, and patient is resting quietly in bed.  No concerns or requests voiced

## 2019-10-25 NOTE — PROGRESS NOTES
Problem: Falls - Risk of  Goal: *Absence of Falls  Description  Document Michelle Patches Fall Risk and appropriate interventions in the flowsheet.   Outcome: Progressing Towards Goal  Note:   Fall Risk Interventions:  Mobility Interventions: Bed/chair exit alarm    Mentation Interventions: Bed/chair exit alarm    Medication Interventions: Bed/chair exit alarm    Elimination Interventions: Bed/chair exit alarm

## 2019-10-25 NOTE — PROGRESS NOTES
Transition of care to SNF: [facility] cm spoke Gracie Dent she is able to accommodate patient tomorrow    Communication to Patient/Family:  Met with patient are agreeable to the transition plan. SNF/Rehab Transition:  Patient has been accepted to [facility] at [address],  [address] [place], [place]., and meets criteria for admission. Patient will transported by medical trasnport and expected to leave at tomorrow if cleared by Md    Communication to SNF/Rehab:  Bedside RN, VIKI, has been notified to update the transition plan to the facility and call report Report to [phone]. Discharge information has been updated on the AVS and sent via [facility] and/or CC link. Discharge instructions to be fax'd to facility at [phone] per requests     Please include all hard scripts for controlled substances, med rec and dc summary, and AVS in packet. Please medicate for pain prior to dc if possible and needed to help offset delay when patient first arrives to facility. Reviewed and confirmed with facility, [facility]  can manage the patient care needs for the following:     Cata President with (X) only those applicable:  Medication:  []Medications are available at the facility  []IV Antibiotics    []Controlled Substance  hard copies available sent. []Weekly Labs    Equipment:  []CPAP/BiPAP  []Wound Vacuum  []Hernandez or Urinary Device  []PICC/Central Line  []Nebulizer  []Ventilator    Treatment:  []Isolation (for MRSA, VRE, etc.)  []Surgical Drain Management  []Tracheostomy Care  []Dressing Changes  []Dialysis with transportation  []PEG Care  []Oxygen  []Daily Weights for Heart Failure    Dietary:  []Any diet limitations  []Tube Feedings   []Total Parenteral Management (TPN)    Financial Resources:  []Medicaid Application Completed    []UAI Completed and copy given to pt/family    [x]A screening has previously been completed.     []Level II Completed    [] Private pay individual who will not become   financially eligible for Medicaid within 6 months from admission to a [facility] facility. [] Individual refused to have screening conducted. []Medicaid Application Completed    []The screening denied because it was determined individual did not need/did not qualify for nursing facility level of care. [] Out of state residents seeking direct admission to a [address] facility. [] Individuals who are inpatients of an out of state hospital, or in state or out of state veterans/ hospital and seek direct admission to a [address] facility  [] Individuals who are pateints or residents of a state owned/operated facility that is licensed by Department of [employer]) and seek direct admission to [address] [place] [address]  [] A screening not required for enrollment in [address] [address] [address] services as set out in [facility] 30-  [] [facility]) staff shall perform screenings of the [facility] clients. Advanced Care Plan:  []Surrogate Decision Maker of Care  []POA  []Communicated Code Status and copy sent. Other:         Care Management Interventions  PCP Verified by CM:  Yes  Palliative Care Criteria Met (RRAT>21 & CHF Dx)?: Yes  Palliative Consult Recommended?: Yes  Mode of Transport at Discharge: BLS  Transition of Care Consult (CM Consult): Long Term Care  Current Support Network: Lives with Spouse, Nursing Facility(wife also lives at [address])  Discharge Location  Discharge Placement: Skilled nursing facility

## 2019-10-25 NOTE — ROUTINE PROCESS
1930 Bedside and Verbal shift change  Received from Marciano Rueda RN (outgoing nurse), to NICHOLE Butler (oncoming)  Pt. Is AOX self. IV SL, Pt. No cues of  pain at this time. Report included the following information SBAR, Kardex, Procedure Summary, Intake/Output, MAR, Recent Lab Results, and  Cardiac Rhythm @ NSR 1AVB. Will resume care and monitor Pt. Condition. Pt. Educated on call bell when in need of help and assistance. Pt. Mearl Fuelling to comprehend education and verbalized understanding. Bed alarm on. 
 
 Pt. Head to toe Assessment Done and documented. 2030  Pt in bed, no sign of distress. 2130  Pt denies pain,  Took meds with apple sauce. 2230  Pt. In bed, no sign of discomfort. 2310  Pt. Given complete care, bath, incontinent care, changed pads and condom cath. Repositioned to semi=carr. 0000 Pt. In bed eyes closed, not in distress. 0200  Pt. Not in distress, resting comfortably in bed. 
 
0400  Pt. Denies pain, no visual cues for pain. 0600  Pt. Able to rest and sleep in between care. Verbal and bedside Shift changed report given to JENNY Siddiqui RN (oncoming RN) on Pt. Condition. Report consisted of patients Situation, History, Activities, intake/output,  Background, Assessment and Recommendations(SBAR).   
Information from the following report(s) Kardex, order Summary, Lab results and MAR was reviewed with the receiving nurse. 
  
Opportunity for questions and clarification was provided.

## 2019-10-25 NOTE — PROGRESS NOTES
NUTRITION FOLLOW-UP    RECOMMENDATIONS/PLAN:   - Order daily phose labs  - Monitor labs/lytes, BM, PO intake, skin integrity, wt, fluid status    NUTRITION ASSESSMENT:   Client Update: 81 yrs old Male with thrombocytopenia, LVEF 36-40%, per cardiology, acute respiratory failure, NSTEMI, UTI, 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, anemia, weakness, CKD     FOOD/NUTRITION INTAKE   Diet Order:  Dysphagia pureed   Supplements: none   Food Allergies: orange juice, pork, tomato  Average PO Intake:      Patient Vitals for the past 100 hrs:   % Diet Eaten   10/25/19 0942 100 %   10/24/19 1915 75 %   10/24/19 1714 0 %   10/24/19 0922 50 %   10/23/19 1334 85 %   10/22/19 1504 50 %   10/21/19 1240 75 %      Pertinent Medications:  [x] Reviewed; D%%, protonix, KCl, pepcid, lopressor  Electrolyte Replacement Protocol: []K []Mg []PO4  Insulin:  []SSI  [x]Pre-meal   [x]Basal    []Drip  [x]None  Cultural/Tenriism Food Preferences: None Identified    BIOCHEMICAL DATA & MEDICAL TESTS  Pertinent Labs:  [x] Reviewed; sodium-153, glucose-183, BUN-43, GFR est nonAA-20, GFR est AA-25  ANTHROPOMETRICS  Height: 5' 8\" (172.7 cm)       Weight: 94.4 kg (208 lb 3.2 oz)         BMI: 31.7 kg/m^2 obese (30%-39.9% BMI)   Adm Weight: 216 lbs                Weight change: -5#, could be fluid related  Adjusted Body Weight: 77 kg     NUTRITION-FOCUSED PHYSICAL ASSESSMENT  Skin: no PU   GI: +BM 10/24    NUTRITION PRESCRIPTION  Calories: 3033-3757 kcal/day based on 30-35 kcal/kg AdjBW  Protein:  59-78 g/day based on .6-.8 g/kg  CHO: 289-337 g/day based on 50% of total energy  Fluid: 5869-3100 ml/day based on 1 kcal/ml        NUTRITION DIAGNOSES:   1. At risk of inadequate oral intake related to chewing and swallowing difficulties as evidenced by altered diet. NUTRITION INTERVENTIONS:   INTERVENTIONS:        GOALS:  1. Order daily phose labs 1.  Encourage PO intake >75% by next review 3 days     LEARNING NEEDS (Diet, Supplementation, Food/Nutrient-Drug Interaction): [] None Identified   [] Education provided/documented      Identified and patient: [] Declined   [x] Was not appropriate/indicated        NUTRITION MONITORING /EVALUATION:   Follow PO intake  Monitor wt  Monitor renal labs, electrolytes, fluid status  Monitor for additional supplement needs     Previous Recommendations Implemented: yes        Previous Goals Met:  yes       [] Participated in Interdisciplinary Rounds    [x] Interdisciplinary Care Plan Reviewed  DISCHARGE NUTRITION RECOMMENDATIONS ADDRESSED:      [x] To be determined closer to discharge    NUTRITION RISK:           [x] At risk                        [] Not currently at risk        Will follow-up per policy.   Wm Day 1

## 2019-10-25 NOTE — PROGRESS NOTES
Problem: Falls - Risk of  Goal: *Absence of Falls  Description  Document [facility] Fall Risk and appropriate interventions in the flowsheet. Outcome: Progressing Towards Goal  Note:   Fall Risk Interventions:  Mobility Interventions: Patient to call before getting OOB    Mentation Interventions: Adequate sleep, hydration, pain control    Medication Interventions: Patient to call before getting OOB    Elimination Interventions: Bed/chair exit alarm              Problem: Patient Education: Go to Patient Education Activity  Goal: Patient/Family Education  Outcome: Progressing Towards Goal     Problem: Pressure Injury - Risk of  Goal: *Prevention of pressure injury  Description  Document [place] Scale and appropriate interventions in the flowsheet.   Outcome: Progressing Towards Goal  Note:   Pressure Injury Interventions:  Sensory Interventions: Keep linens dry and wrinkle-free    Moisture Interventions: Absorbent underpads, Check for incontinence Q2 hours and as needed, Internal/External urinary devices    Activity Interventions: PT/OT evaluation    Mobility Interventions: Assess need for specialty bed    Nutrition Interventions: Document food/fluid/supplement intake, Offer support with meals,snacks and hydration    Friction and Shear Interventions: Apply protective barrier, creams and emollients                Problem: Patient Education: Go to Patient Education Activity  Goal: Patient/Family Education  Outcome: Progressing Towards Goal     Problem: Pain  Goal: *Control of Pain  Outcome: Progressing Towards Goal     Problem: Patient Education: Go to Patient Education Activity  Goal: Patient/Family Education  Outcome: Progressing Towards Goal     Problem: Heart Failure: Day 2  Goal: Activity/Safety  Outcome: Progressing Towards Goal  Goal: Consults, if ordered  Outcome: Progressing Towards Goal  Goal: Diagnostic Test/Procedures  Outcome: Progressing Towards Goal  Goal: Nutrition/Diet  Outcome: Progressing Towards Goal  Goal: Discharge Planning  Outcome: Progressing Towards Goal  Goal: Medications  Outcome: Progressing Towards Goal  Goal: Respiratory  Outcome: Progressing Towards Goal  Goal: Treatments/Interventions/Procedures  Outcome: Progressing Towards Goal  Goal: *Oxygen saturation within defined limits  Outcome: Progressing Towards Goal  Goal: *Hemodynamically stable  Outcome: Progressing Towards Goal  Goal: *Optimal pain control at patient's stated goal  Outcome: Progressing Towards Goal  Goal: *Anxiety reduced or absent  Outcome: Progressing Towards Goal  Goal: *Demonstrates progressive activity  Outcome: Progressing Towards Goal     Problem: Patient Education: Go to Patient Education Activity  Goal: Patient/Family Education  Outcome: Progressing Towards Goal     Problem: Patient Education: Go to Patient Education Activity  Goal: Patient/Family Education  Outcome: Progressing Towards Goal     Problem: Gas Exchange - Impaired  Goal: *Absence of hypoxia  Outcome: Progressing Towards Goal     Problem: Patient Education: Go to Patient Education Activity  Goal: Patient/Family Education  Outcome: Progressing Towards Goal     Problem: Dysphagia (Adult)  Goal: *Speech Goal: (INSERT TEXT)  Outcome: Progressing Towards Goal     Problem: Patient Education: Go to Patient Education Activity  Goal: Patient/Family Education  Outcome: Progressing Towards Goal     Problem: Aspiration - Risk of  Goal: *Absence of aspiration  Outcome: Progressing Towards Goal     Problem: Patient Education: Go to Patient Education Activity  Goal: Patient/Family Education  Outcome: Progressing Towards Goal     Problem: Nutrition Deficit  Goal: *Optimize nutritional status  Outcome: Progressing Towards Goal

## 2019-10-25 NOTE — CDMP QUERY
Patient is noted to have an abnormal Na of 153. Please clarify the clinical significance and any associated diagnosis related to these abnormal findings. Thank you, Tank Alberto, [facility] 2633

## 2019-10-25 NOTE — PROGRESS NOTES
Progress Note    Patient: Celso Perry MRN: [id number]  CSN: [id number]    YOB: 1935  Age: 81 y.o. Sex: male    DOA: 10/16/2019 LOS:  LOS: 9 days                    Subjective:     Chief Complaint:   Chief Complaint   Patient presents with    Respiratory Distress            Objective:     Physical Exam:  Visit Vitals  /68 (BP 1 Location: Right arm, BP Patient Position: At rest;Supine)   Pulse 71   Temp 97.8 °F (36.6 °C)   Resp 18   Ht 5' 8\" (1.727 m)   Wt 94.4 kg (208 lb 3.2 oz)   SpO2 100%   BMI 31.66 kg/m²        General:         Alert, cooperative, no acute distress    HEENT: NC, Atraumatic. PERRLA, anicteric sclerae. Lungs: CTA Bilaterally. No Wheezing/Rhonchi/Rales. Heart:  Regular  rhythm,  No murmur, No Rubs, No Gallops  Abdomen: Soft, Non distended, Non tender.  +Bowel sounds, no HSM  Extremities: No c/c/e  Psych:   Good insight. Not anxious or agitated. Neurologic:  grossly intact, Alert and oriented X 1. No acute neurological                                 Deficits,     Intake and Output:  Current Shift:  No intake/output data recorded. Last three shifts:  10/23 1901 - 10/25 0700  In: 1090 [P.O.:690]  Out: 450 [Urine:450]    Labs: Results:       Chemistry Recent Labs     10/25/19  0345 10/24/19  0400 10/23/19  0045   * 133* 182*   * 150* 148*   K 3.7 3.3* 3.4*   * 111 109   CO2 37* 35* 34*   BUN 43* 45* 49*   CREA 2.96* 2.85* 2.86*   CA 8.4* 8.5 8.6   AGAP 3 4 5   BUCR 15 16 17      CBC w/Diff Recent Labs     10/25/19  0345 10/24/19  0400 10/23/19  0045   WBC 7.4 9.0 8.8   RBC 2.71* 3.02* 3.25*   HGB 7.5* 8.3* 8.8*   HCT 24.8* 27.4* 29.4*    169 161   GRANS 70 69 73   LYMPH 18* 18* 13*   EOS 4 4 4      Cardiac Enzymes No results for input(s): CPK, CKND1, ISIDORO in the last 72 hours. No lab exists for component: CKRMB, TROIP   Coagulation No results for input(s): PTP, INR, APTT, INREXT in the last 72 hours.     Lipid Panel No results found for: CHOL, CHOLPOCT, CHOLX, CHLST, CHOLV, [id number], HDL, HDLP, LDL, LDLC, DLDLP, 701166, VLDLC, VLDL, TGLX, TRIGL, TRIGP, TGLPOCT, CHHD, CHHDX   BNP No results for input(s): BNPP in the last 72 hours. Liver Enzymes No results for input(s): TP, ALB, TBIL, AP, SGOT, GPT in the last 72 hours.     No lab exists for component: DBIL   Thyroid Studies Lab Results   Component Value Date/Time    TSH 0.45 10/18/2019 04:10 AM          Procedures/imaging: see electronic medical records for all procedures/Xrays and details which were not copied into this note but were reviewed prior to creation of Plan    Medications:   Current Facility-Administered Medications   Medication Dose Route Frequency    phenazopyridine (PYRIDIUM) tablet 100 mg  100 mg Oral Q12H    furosemide (LASIX) injection 40 mg  40 mg IntraVENous DAILY    opium-belladonna (B&O 15-A) 16.2-30 mg suppository 1 Suppository  1 Suppository Rectal DAILY AFTER BREAKFAST    insulin glargine (LANTUS) injection 5 Units  5 Units SubCUTAneous QHS    insulin lispro (HUMALOG) injection 4 Units  4 Units SubCUTAneous TIDAC    nystatin (MYCOSTATIN) 100,000 unit/gram powder   Topical TID    metoprolol tartrate (LOPRESSOR) tablet 25 mg  25 mg Oral Q12H    apixaban (ELIQUIS) tablet 2.5 mg  2.5 mg Oral BID    isosorbide dinitrate (ISORDIL) tablet 20 mg  20 mg Oral TID    famotidine (PEPCID) tablet 20 mg  20 mg Oral DAILY    bisacodyl (DULCOLAX) tablet 5 mg  5 mg Oral DAILY PRN    bisacodyl (DULCOLAX) suppository 10 mg  10 mg Rectal DAILY PRN    insulin lispro (HUMALOG) injection   SubCUTAneous Q6H    piperacillin-tazobactam (ZOSYN) 2.25 g in 0.9% sodium chloride (MBP/ADV) 50 mL MBP  2.25 g IntraVENous Q6H    perflutren lipid microspheres (DEFINITY) in NS bolus IV  1 mL IntraVENous PRN    nystatin (MYCOSTATIN) 100,000 unit/mL oral suspension 500,000 Units  500,000 Units Oral QID    atorvastatin (LIPITOR) tablet 40 mg  40 mg Oral DAILY    albuterol-ipratropium (DUO-NEB) 2.5 MG-0.5 MG/3 ML  3 mL Nebulization Q6H PRN    sodium chloride (NS) flush 5-10 mL  5-10 mL IntraVENous PRN    sodium chloride (NS) flush 5-40 mL  5-40 mL IntraVENous Q8H    glucose chewable tablet 16 g  16 g Oral PRN    glucagon (GLUCAGEN) injection 1 mg  1 mg IntraMUSCular PRN    dextrose 10% infusion 125-250 mL  125-250 mL IntraVENous PRN    hydrALAZINE (APRESOLINE) 20 mg/mL injection 10 mg  10 mg IntraVENous Q6H PRN       Assessment/Plan     Principal Problem:    Acute respiratory failure (HCC) (10/16/2019)    Active Problems:    UTI (urinary tract infection) (8/7/2019)  Cont IV zosyn  Urine cultures negative       Acute on chronic combined systolic and diastolic CHF (congestive heart failure) ([facility] [address] (10/16/2019)  Now on po lasix  Off of bipap      NSTEMI (non-ST elevated myocardial infarction) ([facility] [address] (10/17/2019)      CKD (chronic kidney disease) stage 4, GFR 15-29 ml/min ([facility]) (10/22/2019)  Worsened starting fluids     New anemia   ?  CKD vs bleed  Will repeat cbc stat and check hemo ioana  Post pone discharge document no active bleeding   Also with worsening sodium     Worsening Hypernatremia  On Modified diet   Will start low dose half normal saline   Caution as he has CHF    Aspiration Pneumonitis  On IV Zosyn  Will discharge on Augmentin       Case discussed with:  [x]Patient  []Family  [x]Nursing  []Case Management  DVT Prophylaxis:  []Lovenox  []Hep SQ  []SCDs  []Coumadin   []On Heparin gtt    Jimy Choi MD  10/25/2019 7:20 AM

## 2019-10-26 LAB
ANION GAP SERPL CALC-SCNC: 3 MMOL/L (ref 3–18)
BASOPHILS # BLD: 0 K/UL (ref 0–0.1)
BASOPHILS NFR BLD: 0 % (ref 0–2)
BUN SERPL-MCNC: 39 MG/DL (ref 7–18)
BUN/CREAT SERPL: 14 (ref 12–20)
CALCIUM SERPL-MCNC: 8.6 MG/DL (ref 8.5–10.1)
CHLORIDE SERPL-SCNC: 112 MMOL/L (ref 100–111)
CO2 SERPL-SCNC: 35 MMOL/L (ref 21–32)
CREAT SERPL-MCNC: 2.86 MG/DL (ref 0.6–1.3)
DIFFERENTIAL METHOD BLD: ABNORMAL
EOSINOPHIL # BLD: 0.3 K/UL (ref 0–0.4)
EOSINOPHIL NFR BLD: 4 % (ref 0–5)
ERYTHROCYTE [DISTWIDTH] IN BLOOD BY AUTOMATED COUNT: 15.1 % (ref 11.6–14.5)
GLUCOSE BLD STRIP.AUTO-MCNC: 145 MG/DL (ref 70–110)
GLUCOSE BLD STRIP.AUTO-MCNC: 169 MG/DL (ref 70–110)
GLUCOSE BLD STRIP.AUTO-MCNC: 178 MG/DL (ref 70–110)
GLUCOSE BLD STRIP.AUTO-MCNC: 206 MG/DL (ref 70–110)
GLUCOSE BLD STRIP.AUTO-MCNC: 230 MG/DL (ref 70–110)
GLUCOSE SERPL-MCNC: 142 MG/DL (ref 74–99)
HCT VFR BLD AUTO: 24.2 % (ref 36–48)
HCT VFR BLD AUTO: 24.4 % (ref 36–48)
HCT VFR BLD AUTO: 25.6 % (ref 36–48)
HGB BLD-MCNC: 7.3 G/DL (ref 13–16)
HGB BLD-MCNC: 7.4 G/DL (ref 13–16)
HGB BLD-MCNC: 7.6 G/DL (ref 13–16)
LYMPHOCYTES # BLD: 1.8 K/UL (ref 0.9–3.6)
LYMPHOCYTES NFR BLD: 22 % (ref 21–52)
MCH RBC QN AUTO: 27.3 PG (ref 24–34)
MCHC RBC AUTO-ENTMCNC: 29.7 G/DL (ref 31–37)
MCV RBC AUTO: 92.1 FL (ref 74–97)
MONOCYTES # BLD: 0.7 K/UL (ref 0.05–1.2)
MONOCYTES NFR BLD: 8 % (ref 3–10)
NEUTS SEG # BLD: 5.5 K/UL (ref 1.8–8)
NEUTS SEG NFR BLD: 66 % (ref 40–73)
PHOSPHATE SERPL-MCNC: 2.7 MG/DL (ref 2.5–4.9)
PLATELET # BLD AUTO: 175 K/UL (ref 135–420)
PMV BLD AUTO: 11 FL (ref 9.2–11.8)
POTASSIUM SERPL-SCNC: 3.6 MMOL/L (ref 3.5–5.5)
RBC # BLD AUTO: 2.78 M/UL (ref 4.7–5.5)
RBC MORPH BLD: ABNORMAL
SODIUM SERPL-SCNC: 150 MMOL/L (ref 136–145)
WBC # BLD AUTO: 8.3 K/UL (ref 4.6–13.2)

## 2019-10-26 PROCEDURE — 77010033678 HC OXYGEN DAILY

## 2019-10-26 PROCEDURE — 74011636637 HC RX REV CODE- 636/637: Performed by: HOSPITALIST

## 2019-10-26 PROCEDURE — 36415 COLL VENOUS BLD VENIPUNCTURE: CPT

## 2019-10-26 PROCEDURE — 84100 ASSAY OF PHOSPHORUS: CPT

## 2019-10-26 PROCEDURE — 85025 COMPLETE CBC W/AUTO DIFF WBC: CPT

## 2019-10-26 PROCEDURE — 85018 HEMOGLOBIN: CPT

## 2019-10-26 PROCEDURE — 65660000000 HC RM CCU STEPDOWN

## 2019-10-26 PROCEDURE — 74011250637 HC RX REV CODE- 250/637: Performed by: FAMILY MEDICINE

## 2019-10-26 PROCEDURE — 74011250636 HC RX REV CODE- 250/636: Performed by: HOSPITALIST

## 2019-10-26 PROCEDURE — 74011000258 HC RX REV CODE- 258: Performed by: INTERNAL MEDICINE

## 2019-10-26 PROCEDURE — 74011250637 HC RX REV CODE- 250/637: Performed by: HOSPITALIST

## 2019-10-26 PROCEDURE — 82962 GLUCOSE BLOOD TEST: CPT

## 2019-10-26 PROCEDURE — 74011636637 HC RX REV CODE- 636/637: Performed by: FAMILY MEDICINE

## 2019-10-26 PROCEDURE — 80048 BASIC METABOLIC PNL TOTAL CA: CPT

## 2019-10-26 PROCEDURE — 74011000258 HC RX REV CODE- 258: Performed by: HOSPITALIST

## 2019-10-26 RX ADMIN — ISOSORBIDE DINITRATE 20 MG: 20 TABLET ORAL at 23:09

## 2019-10-26 RX ADMIN — Medication 10 ML: at 07:03

## 2019-10-26 RX ADMIN — INSULIN LISPRO 6 UNITS: 100 INJECTION, SOLUTION INTRAVENOUS; SUBCUTANEOUS at 12:59

## 2019-10-26 RX ADMIN — APIXABAN 2.5 MG: 2.5 TABLET, FILM COATED ORAL at 08:40

## 2019-10-26 RX ADMIN — PIPERACILLIN SODIUM,TAZOBACTAM SODIUM 2.25 G: 2; .25 INJECTION, POWDER, FOR SOLUTION INTRAVENOUS at 02:30

## 2019-10-26 RX ADMIN — NYSTATIN: 100000 POWDER TOPICAL at 08:40

## 2019-10-26 RX ADMIN — NYSTATIN: 100000 POWDER TOPICAL at 18:25

## 2019-10-26 RX ADMIN — ISOSORBIDE DINITRATE 20 MG: 20 TABLET ORAL at 18:25

## 2019-10-26 RX ADMIN — Medication 10 ML: at 23:05

## 2019-10-26 RX ADMIN — INSULIN LISPRO 4 UNITS: 100 INJECTION, SOLUTION INTRAVENOUS; SUBCUTANEOUS at 08:39

## 2019-10-26 RX ADMIN — ATROPA BELLADONNA AND OPIUM 1 SUPPOSITORY: 16.2; 3 SUPPOSITORY RECTAL at 08:40

## 2019-10-26 RX ADMIN — INSULIN LISPRO 4 UNITS: 100 INJECTION, SOLUTION INTRAVENOUS; SUBCUTANEOUS at 18:25

## 2019-10-26 RX ADMIN — PIPERACILLIN SODIUM,TAZOBACTAM SODIUM 2.25 G: 2; .25 INJECTION, POWDER, FOR SOLUTION INTRAVENOUS at 07:03

## 2019-10-26 RX ADMIN — FUROSEMIDE 40 MG: 10 INJECTION, SOLUTION INTRAMUSCULAR; INTRAVENOUS at 08:39

## 2019-10-26 RX ADMIN — ISOSORBIDE DINITRATE 20 MG: 20 TABLET ORAL at 08:40

## 2019-10-26 RX ADMIN — INSULIN GLARGINE 5 UNITS: 100 INJECTION, SOLUTION SUBCUTANEOUS at 23:04

## 2019-10-26 RX ADMIN — APIXABAN 2.5 MG: 2.5 TABLET, FILM COATED ORAL at 23:04

## 2019-10-26 RX ADMIN — FAMOTIDINE 20 MG: 20 TABLET ORAL at 08:40

## 2019-10-26 RX ADMIN — INSULIN LISPRO 3 UNITS: 100 INJECTION, SOLUTION INTRAVENOUS; SUBCUTANEOUS at 18:26

## 2019-10-26 RX ADMIN — SODIUM CHLORIDE 75 ML/HR: 450 INJECTION, SOLUTION INTRAVENOUS at 13:48

## 2019-10-26 RX ADMIN — NYSTATIN: 100000 POWDER TOPICAL at 23:05

## 2019-10-26 RX ADMIN — METOPROLOL TARTRATE 25 MG: 25 TABLET, FILM COATED ORAL at 23:04

## 2019-10-26 RX ADMIN — INSULIN LISPRO 4 UNITS: 100 INJECTION, SOLUTION INTRAVENOUS; SUBCUTANEOUS at 12:58

## 2019-10-26 RX ADMIN — METOPROLOL TARTRATE 25 MG: 25 TABLET, FILM COATED ORAL at 08:40

## 2019-10-26 RX ADMIN — INSULIN LISPRO 3 UNITS: 100 INJECTION, SOLUTION INTRAVENOUS; SUBCUTANEOUS at 07:02

## 2019-10-26 RX ADMIN — ATORVASTATIN CALCIUM 40 MG: 20 TABLET, FILM COATED ORAL at 08:40

## 2019-10-26 RX ADMIN — Medication 10 ML: at 13:00

## 2019-10-26 NOTE — PROGRESS NOTES
Progress Note    Patient: Mya Peralta MRN: [id number]  CSN: [id number]    YOB: 1935  Age: 81 y.o. Sex: male    DOA: 10/16/2019 LOS:  LOS: 10 days                    Subjective:     Chief Complaint:   Chief Complaint   Patient presents with    Respiratory Distress     Tolerating fluids ok  No worsenng dyspnea  Sodium improving baseline  About 147  Now 150  Hemoglobin stable     Review of systems  General: No fevers or chills. Cardiovascular: No chest pain or pressure. No palpitations. Pulmonary: No shortness of breath, cough or wheeze. Gastrointestinal: No abdominal pain, nausea, vomiting or diarrhea. Genitourinary: No urinary frequency, urgency, hesitancy or dysuria. Musculoskeletal: No joint or muscle pain, no back pain, no recent trauma. Neurologic: No headache, numbness, tingling or weakness. Objective:     Physical Exam:  Visit Vitals  /85 (BP 1 Location: Left arm, BP Patient Position: At rest;Supine;Sitting)   Pulse 86   Temp 98.1 °F (36.7 °C)   Resp 18   Ht 5' 8\" (1.727 m)   Wt 95.3 kg (210 lb 1.6 oz)   SpO2 97%   BMI 31.95 kg/m²        General:         Alert, cooperative, no acute distress    HEENT: NC, Atraumatic. PERRLA, anicteric sclerae. Lungs: CTA Bilaterally. No Wheezing/Rhonchi/Rales. Heart:  Regular  rhythm,  No murmur, No Rubs, No Gallops  Abdomen: Soft, Non distended, Non tender.  +Bowel sounds, no HSM    Intake and Output:  Current Shift:  No intake/output data recorded.   Last three shifts:  10/24 1901 - 10/26 0700  In: 1580 [P.O.:1180]  Out: 850 [Urine:850]    Labs: Results:       Chemistry Recent Labs     10/26/19  0455 10/25/19  0345 10/24/19  0400   * 183* 133*   * 153* 150*   K 3.6 3.7 3.3*   * 113* 111   CO2 35* 37* 35*   BUN 39* 43* 45*   CREA 2.86* 2.96* 2.85*   CA 8.6 8.4* 8.5   AGAP 3 3 4   BUCR 14 15 16      CBC w/Diff Recent Labs     10/26/19  0455 10/25/19  2230 10/25/19  1220 10/25/19  0345   WBC 8.3  --  7.8 7.4   RBC 2.78*  --  2.76* 2.71*   HGB 7.6* 7.6* 7.6* 7.5*   HCT 25.6* 25.3* 25.5* 24.8*     --  155 164   GRANS 66  --  71 70   LYMPH 22  --  19* 18*   EOS 4  --  4 4      Cardiac Enzymes No results for input(s): CPK, CKND1, ISIDORO in the last 72 hours. No lab exists for component: CKRMB, TROIP   Coagulation No results for input(s): PTP, INR, APTT, INREXT in the last 72 hours. Lipid Panel No results found for: CHOL, CHOLPOCT, CHOLX, CHLST, CHOLV, [id number], HDL, HDLP, LDL, LDLC, DLDLP, 639105, VLDLC, VLDL, TGLX, TRIGL, TRIGP, TGLPOCT, CHHD, CHHDX   BNP No results for input(s): BNPP in the last 72 hours. Liver Enzymes No results for input(s): TP, ALB, TBIL, AP, SGOT, GPT in the last 72 hours.     No lab exists for component: DBIL   Thyroid Studies Lab Results   Component Value Date/Time    TSH 0.45 10/18/2019 04:10 AM          Procedures/imaging: see electronic medical records for all procedures/Xrays and details which were not copied into this note but were reviewed prior to creation of Plan    Medications:   Current Facility-Administered Medications   Medication Dose Route Frequency    0.45% sodium chloride infusion  75 mL/hr IntraVENous CONTINUOUS    furosemide (LASIX) injection 40 mg  40 mg IntraVENous DAILY    opium-belladonna (B&O 15-A) 16.2-30 mg suppository 1 Suppository  1 Suppository Rectal DAILY AFTER BREAKFAST    insulin glargine (LANTUS) injection 5 Units  5 Units SubCUTAneous QHS    insulin lispro (HUMALOG) injection 4 Units  4 Units SubCUTAneous TIDAC    nystatin (MYCOSTATIN) 100,000 unit/gram powder   Topical TID    metoprolol tartrate (LOPRESSOR) tablet 25 mg  25 mg Oral Q12H    apixaban (ELIQUIS) tablet 2.5 mg  2.5 mg Oral BID    isosorbide dinitrate (ISORDIL) tablet 20 mg  20 mg Oral TID    famotidine (PEPCID) tablet 20 mg  20 mg Oral DAILY    bisacodyl (DULCOLAX) tablet 5 mg  5 mg Oral DAILY PRN    bisacodyl (DULCOLAX) suppository 10 mg  10 mg Rectal DAILY PRN    insulin lispro (HUMALOG) injection   SubCUTAneous Q6H    perflutren lipid microspheres (DEFINITY) in NS bolus IV  1 mL IntraVENous PRN    atorvastatin (LIPITOR) tablet 40 mg  40 mg Oral DAILY    albuterol-ipratropium (DUO-NEB) 2.5 MG-0.5 MG/3 ML  3 mL Nebulization Q6H PRN    sodium chloride (NS) flush 5-10 mL  5-10 mL IntraVENous PRN    sodium chloride (NS) flush 5-40 mL  5-40 mL IntraVENous Q8H    glucose chewable tablet 16 g  16 g Oral PRN    glucagon (GLUCAGEN) injection 1 mg  1 mg IntraMUSCular PRN    dextrose 10% infusion 125-250 mL  125-250 mL IntraVENous PRN    hydrALAZINE (APRESOLINE) 20 mg/mL injection 10 mg  10 mg IntraVENous Q6H PRN       Assessment/Plan     Principal Problem:    Acute respiratory failure (HCC) (10/16/2019)    Active Problems:    UTI (urinary tract infection) (8/7/2019)      Acute on chronic combined systolic and diastolic CHF (congestive heart failure) ([facility] [address] (10/16/2019)      NSTEMI (non-ST elevated myocardial infarction) ([facility] [address] (10/17/2019)      CKD (chronic kidney disease) stage 4, GFR 15-29 ml/min ([facility] [address] (10/22/2019)    PlaN  Anticipate discharge to rehab tomorrow if all is stable       Case discussed with:  []Patient  []Family  []Nursing  []Case Management  DVT Prophylaxis:  []Lovenox  []Hep SQ  []SCDs  []Coumadin   []On Heparin gtt    Ronald Gonzalez MD  10/26/2019 10:59 AM

## 2019-10-26 NOTE — PROGRESS NOTES
0730: Bedside and Verbal shift change report given to Janett Pop RN (oncoming nurse) by LELIA Belle RN (offgoing nurse). Report included the following information Kardex, Intake/Output, MAR, Cardiac Rhythm NSR 1°AVB BBB and Alarm Parameters . Pt slowly grasp importance of fall prevention interventions, call bell in reach    1600: MD at the bedside assessing pt, this nurse made aware of tx plan and instructed to continue to monitor pt  Janett Pop RN    1900: Bedside and Verbal shift change report given to KIKO Cristobal RN (oncoming nurse) by Janett Pop RN (offgoing nurse). Report included the following information Kardex, Intake/Output, MAR and Cardiac Rhythm NSR 1°AVB BBB.

## 2019-10-27 LAB
ALBUMIN SERPL-MCNC: 2.2 G/DL (ref 3.4–5)
ALBUMIN/GLOB SERPL: 0.5 {RATIO} (ref 0.8–1.7)
ALP SERPL-CCNC: 69 U/L (ref 45–117)
ALT SERPL-CCNC: 11 U/L (ref 16–61)
ANION GAP SERPL CALC-SCNC: 3 MMOL/L (ref 3–18)
AST SERPL-CCNC: 14 U/L (ref 10–38)
BASOPHILS # BLD: 0 K/UL (ref 0–0.1)
BASOPHILS # BLD: 0 K/UL (ref 0–0.1)
BASOPHILS NFR BLD: 0 % (ref 0–2)
BASOPHILS NFR BLD: 0 % (ref 0–2)
BILIRUB SERPL-MCNC: 0.6 MG/DL (ref 0.2–1)
BUN SERPL-MCNC: 38 MG/DL (ref 7–18)
BUN/CREAT SERPL: 14 (ref 12–20)
CALCIUM SERPL-MCNC: 8.1 MG/DL (ref 8.5–10.1)
CHLORIDE SERPL-SCNC: 111 MMOL/L (ref 100–111)
CO2 SERPL-SCNC: 36 MMOL/L (ref 21–32)
CREAT SERPL-MCNC: 2.77 MG/DL (ref 0.6–1.3)
DIFFERENTIAL METHOD BLD: ABNORMAL
DIFFERENTIAL METHOD BLD: ABNORMAL
EOSINOPHIL # BLD: 0.3 K/UL (ref 0–0.4)
EOSINOPHIL # BLD: 0.3 K/UL (ref 0–0.4)
EOSINOPHIL NFR BLD: 3 % (ref 0–5)
EOSINOPHIL NFR BLD: 4 % (ref 0–5)
ERYTHROCYTE [DISTWIDTH] IN BLOOD BY AUTOMATED COUNT: 14.9 % (ref 11.6–14.5)
ERYTHROCYTE [DISTWIDTH] IN BLOOD BY AUTOMATED COUNT: 14.9 % (ref 11.6–14.5)
GLOBULIN SER CALC-MCNC: 4.3 G/DL (ref 2–4)
GLUCOSE BLD STRIP.AUTO-MCNC: 105 MG/DL (ref 70–110)
GLUCOSE BLD STRIP.AUTO-MCNC: 149 MG/DL (ref 70–110)
GLUCOSE BLD STRIP.AUTO-MCNC: 168 MG/DL (ref 70–110)
GLUCOSE BLD STRIP.AUTO-MCNC: 187 MG/DL (ref 70–110)
GLUCOSE SERPL-MCNC: 139 MG/DL (ref 74–99)
HCT VFR BLD AUTO: 23.6 % (ref 36–48)
HCT VFR BLD AUTO: 23.6 % (ref 36–48)
HCT VFR BLD AUTO: 25.7 % (ref 36–48)
HGB BLD-MCNC: 7 G/DL (ref 13–16)
HGB BLD-MCNC: 7.1 G/DL (ref 13–16)
HGB BLD-MCNC: 7.9 G/DL (ref 13–16)
LYMPHOCYTES # BLD: 1.5 K/UL (ref 0.9–3.6)
LYMPHOCYTES # BLD: 1.6 K/UL (ref 0.9–3.6)
LYMPHOCYTES NFR BLD: 20 % (ref 21–52)
LYMPHOCYTES NFR BLD: 21 % (ref 21–52)
MAGNESIUM SERPL-MCNC: 2.2 MG/DL (ref 1.6–2.6)
MCH RBC QN AUTO: 27.2 PG (ref 24–34)
MCH RBC QN AUTO: 27.6 PG (ref 24–34)
MCHC RBC AUTO-ENTMCNC: 29.7 G/DL (ref 31–37)
MCHC RBC AUTO-ENTMCNC: 30.1 G/DL (ref 31–37)
MCV RBC AUTO: 91.8 FL (ref 74–97)
MCV RBC AUTO: 91.8 FL (ref 74–97)
MONOCYTES # BLD: 0.4 K/UL (ref 0.05–1.2)
MONOCYTES # BLD: 0.6 K/UL (ref 0.05–1.2)
MONOCYTES NFR BLD: 6 % (ref 3–10)
MONOCYTES NFR BLD: 8 % (ref 3–10)
NEUTS SEG # BLD: 5.2 K/UL (ref 1.8–8)
NEUTS SEG # BLD: 5.2 K/UL (ref 1.8–8)
NEUTS SEG NFR BLD: 67 % (ref 40–73)
NEUTS SEG NFR BLD: 71 % (ref 40–73)
PLATELET # BLD AUTO: 172 K/UL (ref 135–420)
PLATELET # BLD AUTO: 173 K/UL (ref 135–420)
PMV BLD AUTO: 10.9 FL (ref 9.2–11.8)
PMV BLD AUTO: 11 FL (ref 9.2–11.8)
POTASSIUM SERPL-SCNC: 3.3 MMOL/L (ref 3.5–5.5)
PROT SERPL-MCNC: 6.5 G/DL (ref 6.4–8.2)
RBC # BLD AUTO: 2.57 M/UL (ref 4.7–5.5)
RBC # BLD AUTO: 2.57 M/UL (ref 4.7–5.5)
SODIUM SERPL-SCNC: 150 MMOL/L (ref 136–145)
WBC # BLD AUTO: 7.5 K/UL (ref 4.6–13.2)
WBC # BLD AUTO: 7.7 K/UL (ref 4.6–13.2)

## 2019-10-27 PROCEDURE — 85018 HEMOGLOBIN: CPT

## 2019-10-27 PROCEDURE — 74011250637 HC RX REV CODE- 250/637: Performed by: HOSPITALIST

## 2019-10-27 PROCEDURE — 87252 VIRUS INOCULATION TISSUE: CPT

## 2019-10-27 PROCEDURE — 82962 GLUCOSE BLOOD TEST: CPT

## 2019-10-27 PROCEDURE — 77010033678 HC OXYGEN DAILY

## 2019-10-27 PROCEDURE — 65660000000 HC RM CCU STEPDOWN

## 2019-10-27 PROCEDURE — 74011250636 HC RX REV CODE- 250/636: Performed by: HOSPITALIST

## 2019-10-27 PROCEDURE — 83735 ASSAY OF MAGNESIUM: CPT

## 2019-10-27 PROCEDURE — 74011636637 HC RX REV CODE- 636/637: Performed by: FAMILY MEDICINE

## 2019-10-27 PROCEDURE — 80053 COMPREHEN METABOLIC PANEL: CPT

## 2019-10-27 PROCEDURE — 74011250637 HC RX REV CODE- 250/637: Performed by: FAMILY MEDICINE

## 2019-10-27 PROCEDURE — 74011636637 HC RX REV CODE- 636/637: Performed by: HOSPITALIST

## 2019-10-27 PROCEDURE — 74011250637 HC RX REV CODE- 250/637: Performed by: INTERNAL MEDICINE

## 2019-10-27 PROCEDURE — 85025 COMPLETE CBC W/AUTO DIFF WBC: CPT

## 2019-10-27 PROCEDURE — 36415 COLL VENOUS BLD VENIPUNCTURE: CPT

## 2019-10-27 RX ORDER — VALACYCLOVIR HYDROCHLORIDE 500 MG/1
500 TABLET, FILM COATED ORAL EVERY 12 HOURS
Status: DISCONTINUED | OUTPATIENT
Start: 2019-10-27 | End: 2019-10-30 | Stop reason: HOSPADM

## 2019-10-27 RX ORDER — POTASSIUM CHLORIDE 20 MEQ/1
40 TABLET, EXTENDED RELEASE ORAL
Status: COMPLETED | OUTPATIENT
Start: 2019-10-27 | End: 2019-10-27

## 2019-10-27 RX ADMIN — NYSTATIN: 100000 POWDER TOPICAL at 22:21

## 2019-10-27 RX ADMIN — NYSTATIN: 100000 POWDER TOPICAL at 08:42

## 2019-10-27 RX ADMIN — INSULIN LISPRO 6 UNITS: 100 INJECTION, SOLUTION INTRAVENOUS; SUBCUTANEOUS at 00:21

## 2019-10-27 RX ADMIN — FUROSEMIDE 40 MG: 10 INJECTION, SOLUTION INTRAMUSCULAR; INTRAVENOUS at 08:42

## 2019-10-27 RX ADMIN — FAMOTIDINE 20 MG: 20 TABLET ORAL at 08:41

## 2019-10-27 RX ADMIN — INSULIN LISPRO 3 UNITS: 100 INJECTION, SOLUTION INTRAVENOUS; SUBCUTANEOUS at 17:29

## 2019-10-27 RX ADMIN — METOPROLOL TARTRATE 25 MG: 25 TABLET, FILM COATED ORAL at 22:20

## 2019-10-27 RX ADMIN — APIXABAN 2.5 MG: 2.5 TABLET, FILM COATED ORAL at 08:42

## 2019-10-27 RX ADMIN — INSULIN GLARGINE 5 UNITS: 100 INJECTION, SOLUTION SUBCUTANEOUS at 22:20

## 2019-10-27 RX ADMIN — Medication 10 ML: at 22:21

## 2019-10-27 RX ADMIN — INSULIN LISPRO 4 UNITS: 100 INJECTION, SOLUTION INTRAVENOUS; SUBCUTANEOUS at 17:30

## 2019-10-27 RX ADMIN — VALACYCLOVIR HYDROCHLORIDE 500 MG: 500 TABLET, FILM COATED ORAL at 22:20

## 2019-10-27 RX ADMIN — Medication 10 ML: at 17:38

## 2019-10-27 RX ADMIN — ATROPA BELLADONNA AND OPIUM 1 SUPPOSITORY: 16.2; 3 SUPPOSITORY RECTAL at 08:42

## 2019-10-27 RX ADMIN — METOPROLOL TARTRATE 25 MG: 25 TABLET, FILM COATED ORAL at 08:42

## 2019-10-27 RX ADMIN — ATORVASTATIN CALCIUM 40 MG: 20 TABLET, FILM COATED ORAL at 08:41

## 2019-10-27 RX ADMIN — ISOSORBIDE DINITRATE 20 MG: 20 TABLET ORAL at 17:29

## 2019-10-27 RX ADMIN — APIXABAN 2.5 MG: 2.5 TABLET, FILM COATED ORAL at 22:20

## 2019-10-27 RX ADMIN — NYSTATIN: 100000 POWDER TOPICAL at 17:29

## 2019-10-27 RX ADMIN — POTASSIUM CHLORIDE 40 MEQ: 20 TABLET, EXTENDED RELEASE ORAL at 07:09

## 2019-10-27 RX ADMIN — INSULIN LISPRO 4 UNITS: 100 INJECTION, SOLUTION INTRAVENOUS; SUBCUTANEOUS at 14:08

## 2019-10-27 RX ADMIN — INSULIN LISPRO 3 UNITS: 100 INJECTION, SOLUTION INTRAVENOUS; SUBCUTANEOUS at 14:08

## 2019-10-27 RX ADMIN — Medication 10 ML: at 07:09

## 2019-10-27 RX ADMIN — ISOSORBIDE DINITRATE 20 MG: 20 TABLET ORAL at 08:41

## 2019-10-27 RX ADMIN — ISOSORBIDE DINITRATE 20 MG: 20 TABLET ORAL at 22:20

## 2019-10-27 RX ADMIN — INSULIN LISPRO 4 UNITS: 100 INJECTION, SOLUTION INTRAVENOUS; SUBCUTANEOUS at 08:41

## 2019-10-27 NOTE — PROGRESS NOTES
0730: Bedside and Verbal shift change report given to A: Elijah Patric (oncoming nurse) by Tapan Haq RN (offgoing nurse). Report included the following information SBAR, Kardex, Cardiac Rhythm NSR 1°AVB BBB and Alarm Parameters . 0930: MD Jauregui updates this nurse on pt condition and further tx, this nurse concurs with MD to d/c flds, MD request continue IV flds at this time, no further questions noted from this nurse, MD made aware matter will be addressed  Primus Loy CORDOVA    Pt call beard in reach to contact nurse staff and prevent falls     1410: MD Jauregui at the pt bedside, skin assessment complete, lesions noted in rachel area, n/o noted  Gisele Granado RN    [phone]: this nurse request lab dept provide testing supplies needed for culture, lab makes this nurse aware matter will be addressed  Gisele Granado RN    0480: lab specimen ordered by MD Cristine Shrestha obtained and sent to the laboratory dept  Gisele Granado RN    1900: Bedside and Verbal shift change report given to KIKO Haq RN (oncoming nurse) by Gisele Granado RN (offgoing nurse). Report included the following information Kardex, Intake/Output, MAR and Cardiac Rhythm NSR .

## 2019-10-27 NOTE — PROGRESS NOTES
1900 Bedside shift change report given to KIKO Lovett RN (oncoming nurse) by Lani Ding RN (offgoing nurse). Report included the following information SBAR and Kardex. 4133 Notified Dr. Raul Mabry about potassium of 3.3. Orders to give 40 meq of potassium PO.    0710  Bedside shift change report given to Lani Ding RN(oncoming nurse) by Wandy Lovett RN (offgoing nurse). Report included the following information SBAR and Kardex.

## 2019-10-27 NOTE — PROGRESS NOTES
Progress Note    Patient: Cele Vargas MRN: [id number]  SSN: [id number]    YOB: 1935  Age: 81 y.o. Sex: male      Admit Date: 10/16/2019    LOS: 11 days     Subjective:     56-year-old with dementia admitted with CHF and respiratory failure improving crying out and yelling in pain due to penis the lesions [place] catheter attempted to alleviate pain with no improvement    Objective:     Vitals:    10/26/19 1926 10/26/19 2309 10/27/19 0334 10/27/19 0846   BP: 141/59 139/58 129/54 157/73   Pulse: 66 68 62 77   Resp: 17  16 16   Temp: 98 °F (36.7 °C) 98.6 °F (37 °C) 98.4 °F (36.9 °C) 98.4 °F (36.9 °C)   SpO2: 97%  97% 98%   Weight:  95.3 kg (210 lb)     Height:            Intake and Output:  Current Shift: No intake/output data recorded. Last three shifts: 10/25 1901 - 10/27 0700  In: 170 [P.O.:170]  Out: 4 [Urine:4]    Physical Exam:   GENERAL: alert, cooperative, uncooperative, delirious, no distress, appears stated age  Skin:multiple penile ulcerations appears viral   Lab/Data Review: All lab results for the last 24 hours reviewed. Assessment:         Plan: 1. Penile lesions  Check viral cultures start on Valtrex    2. Hypernatremia but with history of heart failure starting improving with half-normal saline should monitor for heart failure  3. Anemia likely from chronic disease now at 7 will await Hemoccult transfuse if under 7    4.   Urinary tract infection negative cultures not on antibiotics    Signed By: Sanford Castañeda MD     October 27, 2019

## 2019-10-27 NOTE — PROGRESS NOTES
Problem: Falls - Risk of  Goal: *Absence of Falls  Description  Document Charlee Valadez Fall Risk and appropriate interventions in the flowsheet. Outcome: Progressing Towards Goal  Note:   Fall Risk Interventions:  Mobility Interventions: Patient to call before getting OOB, Bed/chair exit alarm    Mentation Interventions: Bed/chair exit alarm, Door open when patient unattended, Adequate sleep, hydration, pain control, Room close to nurse's station, Toileting rounds    Medication Interventions: Patient to call before getting OOB, Teach patient to arise slowly    Elimination Interventions: Call light in reach, Patient to call for help with toileting needs, Toileting schedule/hourly rounds              Problem: Patient Education: Go to Patient Education Activity  Goal: Patient/Family Education  Outcome: Progressing Towards Goal     Problem: Pressure Injury - Risk of  Goal: *Prevention of pressure injury  Description  Document Oseas Scale and appropriate interventions in the flowsheet.   Outcome: Progressing Towards Goal  Note:   Pressure Injury Interventions:  Sensory Interventions: Assess need for specialty bed, Assess changes in LOC, Keep linens dry and wrinkle-free, Maintain/enhance activity level, Minimize linen layers, Monitor skin under medical devices    Moisture Interventions: Absorbent underpads, Apply protective barrier, creams and emollients, Check for incontinence Q2 hours and as needed, Limit adult briefs, Minimize layers, Moisture barrier    Activity Interventions: Pressure redistribution bed/mattress(bed type), Increase time out of bed, PT/OT evaluation    Mobility Interventions: HOB 30 degrees or less, Pressure redistribution bed/mattress (bed type), PT/OT evaluation    Nutrition Interventions: Document food/fluid/supplement intake    Friction and Shear Interventions: HOB 30 degrees or less, Minimize layers                Problem: Patient Education: Go to Patient Education Activity  Goal: Patient/Family Education  Outcome: Progressing Towards Goal     Problem: Pain  Goal: *Control of Pain  Outcome: Progressing Towards Goal     Problem: Patient Education: Go to Patient Education Activity  Goal: Patient/Family Education  Outcome: Progressing Towards Goal     Problem: Heart Failure: Day 2  Goal: Activity/Safety  Outcome: Progressing Towards Goal  Goal: Consults, if ordered  Outcome: Progressing Towards Goal  Goal: Diagnostic Test/Procedures  Outcome: Progressing Towards Goal  Goal: Nutrition/Diet  Outcome: Progressing Towards Goal  Goal: Discharge Planning  Outcome: Progressing Towards Goal  Goal: Medications  Outcome: Progressing Towards Goal  Goal: Respiratory  Outcome: Progressing Towards Goal  Goal: Treatments/Interventions/Procedures  Outcome: Progressing Towards Goal  Goal: *Oxygen saturation within defined limits  Outcome: Progressing Towards Goal  Goal: *Hemodynamically stable  Outcome: Progressing Towards Goal  Goal: *Optimal pain control at patient's stated goal  Outcome: Progressing Towards Goal  Goal: *Anxiety reduced or absent  Outcome: Progressing Towards Goal  Goal: *Demonstrates progressive activity  Outcome: Progressing Towards Goal     Problem: Patient Education: Go to Patient Education Activity  Goal: Patient/Family Education  Outcome: Progressing Towards Goal     Problem: Patient Education: Go to Patient Education Activity  Goal: Patient/Family Education  Outcome: Progressing Towards Goal     Problem: Gas Exchange - Impaired  Goal: *Absence of hypoxia  Outcome: Progressing Towards Goal     Problem: Patient Education: Go to Patient Education Activity  Goal: Patient/Family Education  Outcome: Progressing Towards Goal     Problem: Dysphagia (Adult)  Goal: *Speech Goal: (INSERT TEXT)  Outcome: Progressing Towards Goal     Problem: Patient Education: Go to Patient Education Activity  Goal: Patient/Family Education  Outcome: Progressing Towards Goal     Problem: Aspiration - Risk of  Goal: *Absence of aspiration  Outcome: Progressing Towards Goal     Problem: Patient Education: Go to Patient Education Activity  Goal: Patient/Family Education  Outcome: Progressing Towards Goal     Problem: Nutrition Deficit  Goal: *Optimize nutritional status  Outcome: Progressing Towards Goal

## 2019-10-28 LAB
ALBUMIN SERPL-MCNC: 2.1 G/DL (ref 3.4–5)
ALBUMIN/GLOB SERPL: 0.5 {RATIO} (ref 0.8–1.7)
ALP SERPL-CCNC: 69 U/L (ref 45–117)
ALT SERPL-CCNC: 11 U/L (ref 16–61)
ANION GAP SERPL CALC-SCNC: 2 MMOL/L (ref 3–18)
APPEARANCE UR: CLEAR
AST SERPL-CCNC: 15 U/L (ref 10–38)
BACTERIA URNS QL MICRO: NEGATIVE /HPF
BASOPHILS # BLD: 0 K/UL (ref 0–0.1)
BASOPHILS NFR BLD: 0 % (ref 0–2)
BILIRUB SERPL-MCNC: 0.4 MG/DL (ref 0.2–1)
BILIRUB UR QL: NEGATIVE
BUN SERPL-MCNC: 34 MG/DL (ref 7–18)
BUN/CREAT SERPL: 14 (ref 12–20)
CALCIUM SERPL-MCNC: 7.9 MG/DL (ref 8.5–10.1)
CHLORIDE SERPL-SCNC: 109 MMOL/L (ref 100–111)
CO2 SERPL-SCNC: 36 MMOL/L (ref 21–32)
COLOR UR: YELLOW
CREAT SERPL-MCNC: 2.5 MG/DL (ref 0.6–1.3)
DIFFERENTIAL METHOD BLD: ABNORMAL
EOSINOPHIL # BLD: 0.3 K/UL (ref 0–0.4)
EOSINOPHIL NFR BLD: 4 % (ref 0–5)
EPITH CASTS URNS QL MICRO: NORMAL /LPF (ref 0–5)
ERYTHROCYTE [DISTWIDTH] IN BLOOD BY AUTOMATED COUNT: 14.9 % (ref 11.6–14.5)
ERYTHROCYTE [DISTWIDTH] IN BLOOD BY AUTOMATED COUNT: 15 % (ref 11.6–14.5)
GLOBULIN SER CALC-MCNC: 4.4 G/DL (ref 2–4)
GLUCOSE BLD STRIP.AUTO-MCNC: 136 MG/DL (ref 70–110)
GLUCOSE BLD STRIP.AUTO-MCNC: 193 MG/DL (ref 70–110)
GLUCOSE BLD STRIP.AUTO-MCNC: 195 MG/DL (ref 70–110)
GLUCOSE BLD STRIP.AUTO-MCNC: 206 MG/DL (ref 70–110)
GLUCOSE BLD STRIP.AUTO-MCNC: 263 MG/DL (ref 70–110)
GLUCOSE SERPL-MCNC: 157 MG/DL (ref 74–99)
GLUCOSE UR STRIP.AUTO-MCNC: NEGATIVE MG/DL
HCT VFR BLD AUTO: 23 % (ref 36–48)
HCT VFR BLD AUTO: 23.6 % (ref 36–48)
HCT VFR BLD AUTO: 25 % (ref 36–48)
HGB BLD-MCNC: 6.8 G/DL (ref 13–16)
HGB BLD-MCNC: 7.2 G/DL (ref 13–16)
HGB BLD-MCNC: 7.5 G/DL (ref 13–16)
HGB UR QL STRIP: NEGATIVE
KETONES UR QL STRIP.AUTO: NEGATIVE MG/DL
LEUKOCYTE ESTERASE UR QL STRIP.AUTO: NEGATIVE
LYMPHOCYTES # BLD: 1.8 K/UL (ref 0.9–3.6)
LYMPHOCYTES NFR BLD: 24 % (ref 21–52)
MCH RBC QN AUTO: 27.3 PG (ref 24–34)
MCH RBC QN AUTO: 27.5 PG (ref 24–34)
MCHC RBC AUTO-ENTMCNC: 29.6 G/DL (ref 31–37)
MCHC RBC AUTO-ENTMCNC: 30 G/DL (ref 31–37)
MCV RBC AUTO: 91.6 FL (ref 74–97)
MCV RBC AUTO: 92.4 FL (ref 74–97)
MONOCYTES # BLD: 0.5 K/UL (ref 0.05–1.2)
MONOCYTES NFR BLD: 7 % (ref 3–10)
NEUTS SEG # BLD: 4.9 K/UL (ref 1.8–8)
NEUTS SEG NFR BLD: 65 % (ref 40–73)
NITRITE UR QL STRIP.AUTO: NEGATIVE
PH UR STRIP: 7.5 [PH] (ref 5–8)
PLATELET # BLD AUTO: 182 K/UL (ref 135–420)
PLATELET # BLD AUTO: 187 K/UL (ref 135–420)
PMV BLD AUTO: 11 FL (ref 9.2–11.8)
PMV BLD AUTO: 11.4 FL (ref 9.2–11.8)
POTASSIUM SERPL-SCNC: 3.6 MMOL/L (ref 3.5–5.5)
PROT SERPL-MCNC: 6.5 G/DL (ref 6.4–8.2)
PROT UR STRIP-MCNC: 100 MG/DL
RBC # BLD AUTO: 2.49 M/UL (ref 4.7–5.5)
RBC # BLD AUTO: 2.73 M/UL (ref 4.7–5.5)
RBC #/AREA URNS HPF: NORMAL /HPF (ref 0–5)
SODIUM SERPL-SCNC: 147 MMOL/L (ref 136–145)
SP GR UR REFRACTOMETRY: 1.01 (ref 1–1.03)
UROBILINOGEN UR QL STRIP.AUTO: 0.2 EU/DL (ref 0.2–1)
WBC # BLD AUTO: 7.6 K/UL (ref 4.6–13.2)
WBC # BLD AUTO: 7.8 K/UL (ref 4.6–13.2)
WBC URNS QL MICRO: NORMAL /HPF (ref 0–5)

## 2019-10-28 PROCEDURE — 85025 COMPLETE CBC W/AUTO DIFF WBC: CPT

## 2019-10-28 PROCEDURE — 74011636637 HC RX REV CODE- 636/637: Performed by: HOSPITALIST

## 2019-10-28 PROCEDURE — 87086 URINE CULTURE/COLONY COUNT: CPT

## 2019-10-28 PROCEDURE — 85027 COMPLETE CBC AUTOMATED: CPT

## 2019-10-28 PROCEDURE — 82962 GLUCOSE BLOOD TEST: CPT

## 2019-10-28 PROCEDURE — 81001 URINALYSIS AUTO W/SCOPE: CPT

## 2019-10-28 PROCEDURE — 74011636637 HC RX REV CODE- 636/637: Performed by: FAMILY MEDICINE

## 2019-10-28 PROCEDURE — 85018 HEMOGLOBIN: CPT

## 2019-10-28 PROCEDURE — 92526 ORAL FUNCTION THERAPY: CPT

## 2019-10-28 PROCEDURE — 74011250637 HC RX REV CODE- 250/637: Performed by: INTERNAL MEDICINE

## 2019-10-28 PROCEDURE — 65660000000 HC RM CCU STEPDOWN

## 2019-10-28 PROCEDURE — 36415 COLL VENOUS BLD VENIPUNCTURE: CPT

## 2019-10-28 PROCEDURE — 74011250637 HC RX REV CODE- 250/637: Performed by: FAMILY MEDICINE

## 2019-10-28 PROCEDURE — 74011250636 HC RX REV CODE- 250/636: Performed by: HOSPITALIST

## 2019-10-28 PROCEDURE — 80053 COMPREHEN METABOLIC PANEL: CPT

## 2019-10-28 PROCEDURE — 74011250637 HC RX REV CODE- 250/637: Performed by: HOSPITALIST

## 2019-10-28 PROCEDURE — 77010033678 HC OXYGEN DAILY

## 2019-10-28 RX ORDER — INSULIN LISPRO 100 [IU]/ML
INJECTION, SOLUTION INTRAVENOUS; SUBCUTANEOUS
Status: DISCONTINUED | OUTPATIENT
Start: 2019-10-28 | End: 2019-10-30 | Stop reason: HOSPADM

## 2019-10-28 RX ADMIN — METOPROLOL TARTRATE 25 MG: 25 TABLET, FILM COATED ORAL at 09:49

## 2019-10-28 RX ADMIN — INSULIN LISPRO 4 UNITS: 100 INJECTION, SOLUTION INTRAVENOUS; SUBCUTANEOUS at 16:34

## 2019-10-28 RX ADMIN — APIXABAN 2.5 MG: 2.5 TABLET, FILM COATED ORAL at 09:49

## 2019-10-28 RX ADMIN — ISOSORBIDE DINITRATE 20 MG: 20 TABLET ORAL at 22:12

## 2019-10-28 RX ADMIN — METOPROLOL TARTRATE 25 MG: 25 TABLET, FILM COATED ORAL at 22:11

## 2019-10-28 RX ADMIN — ISOSORBIDE DINITRATE 20 MG: 20 TABLET ORAL at 16:34

## 2019-10-28 RX ADMIN — Medication 10 ML: at 06:58

## 2019-10-28 RX ADMIN — ATORVASTATIN CALCIUM 40 MG: 20 TABLET, FILM COATED ORAL at 09:49

## 2019-10-28 RX ADMIN — NYSTATIN: 100000 POWDER TOPICAL at 22:13

## 2019-10-28 RX ADMIN — FAMOTIDINE 20 MG: 20 TABLET ORAL at 09:49

## 2019-10-28 RX ADMIN — NYSTATIN: 100000 POWDER TOPICAL at 16:35

## 2019-10-28 RX ADMIN — INSULIN LISPRO 3 UNITS: 100 INJECTION, SOLUTION INTRAVENOUS; SUBCUTANEOUS at 01:26

## 2019-10-28 RX ADMIN — INSULIN GLARGINE 5 UNITS: 100 INJECTION, SOLUTION SUBCUTANEOUS at 22:12

## 2019-10-28 RX ADMIN — APIXABAN 2.5 MG: 2.5 TABLET, FILM COATED ORAL at 22:12

## 2019-10-28 RX ADMIN — INSULIN LISPRO 6 UNITS: 100 INJECTION, SOLUTION INTRAVENOUS; SUBCUTANEOUS at 16:35

## 2019-10-28 RX ADMIN — ATROPA BELLADONNA AND OPIUM 1 SUPPOSITORY: 16.2; 3 SUPPOSITORY RECTAL at 10:00

## 2019-10-28 RX ADMIN — VALACYCLOVIR HYDROCHLORIDE 500 MG: 500 TABLET, FILM COATED ORAL at 09:49

## 2019-10-28 RX ADMIN — INSULIN LISPRO 4 UNITS: 100 INJECTION, SOLUTION INTRAVENOUS; SUBCUTANEOUS at 11:24

## 2019-10-28 RX ADMIN — FUROSEMIDE 40 MG: 10 INJECTION, SOLUTION INTRAMUSCULAR; INTRAVENOUS at 09:49

## 2019-10-28 RX ADMIN — INSULIN LISPRO 4 UNITS: 100 INJECTION, SOLUTION INTRAVENOUS; SUBCUTANEOUS at 07:30

## 2019-10-28 RX ADMIN — INSULIN LISPRO 9 UNITS: 100 INJECTION, SOLUTION INTRAVENOUS; SUBCUTANEOUS at 22:12

## 2019-10-28 RX ADMIN — ISOSORBIDE DINITRATE 20 MG: 20 TABLET ORAL at 09:49

## 2019-10-28 RX ADMIN — INSULIN LISPRO 3 UNITS: 100 INJECTION, SOLUTION INTRAVENOUS; SUBCUTANEOUS at 11:24

## 2019-10-28 RX ADMIN — VALACYCLOVIR HYDROCHLORIDE 500 MG: 500 TABLET, FILM COATED ORAL at 22:12

## 2019-10-28 RX ADMIN — NYSTATIN: 100000 POWDER TOPICAL at 10:00

## 2019-10-28 NOTE — PROGRESS NOTES
Problem: Dysphagia (Adult)  Goal: *Acute Goals and Plan of Care (Insert Text)  Description  Recommendations:  Diet: Puree/NECTAR thick liquid with NO STRAWS  Meds: As tolerated  Aspiration Precautions  Oral Care TID  Other: Feeding assistance    Goals:  Patient will:  1. Tolerate PO trials with 0 s/s overt distress in 4/5 trials - goal met  2. Utilize compensatory swallow strategies/maneuvers (decrease bite/sip, size/rate, alt. liq/sol) with min cues in 4/5 trials - goal met  3. Complete an objective swallow study (i.e., MBSS) to assess swallow integrity, r/o aspiration, and determine of safest LRD, min A - goal met 10/21    Outcome: Resolved/Met    SPEECH LANGUAGE PATHOLOGY DYSPHAGIA TREATMENT & DISCHARGE    Patient: Cele Vargas (89 y.o. male)  Date: 10/28/2019  Diagnosis: CHF exacerbation ([facility]) [I50.9]  Acute respiratory failure ([facility]) [J96.00]  Acute on chronic combined systolic and diastolic CHF (congestive heart failure) ([facility]) [I50.43] Acute respiratory failure (H[facility])  Procedure(s) (LRB):  ESOPHAGOGASTRODUODENOSCOPY (EGD) (N/A) 11 Days Post-Op  Precautions: Aspiration Fall  PLOF: SNF    Followed up with dysphagia intervention. MBSS completed 10/21 without aspiration, rec: puree/NTL via cup only. This day, A&O to self. Patient accepted nectar-thick liquid via cup sip and pills crushed in puree during med pass. 0 overt s/sx of aspiration across all trials. Continues to demo delayed swallow initiation and mild oral residue, cleared with NTL. Recommend patient continue puree/NECTAR thick liquid diet with no straws, small sips/bites, feeding assistance, strict aspiration precautions. Pills should be presented whole in puree or as tolerated. Patient remains at high risk for aspiration due to limited safety awareness, limited command following and advanced dementia. Maximum therapeutic gains met; safest, least restrictive diet achieved in current in-patient/acute setting.  Accordingly, SLP to d/c intervention at this time.      Progression toward goals:  ?         Improving appropriately - goals met/approximated  ? Not making progress/Not appropriate - will d/c POC     PLAN:  Recommendations and Planned Interventions:  Maximum therapeutic gains met; safest, least restrictive diet achieved. D/C ST intervention at this time. Discharge Recommendations:  Skilled Nursing Facility     SUBJECTIVE:   Patient stated I'm feeling sporty. OBJECTIVE:   Cognitive and Communication Status:  Neurologic State: Alert, Confused  Orientation Level: Oriented to person  Cognition: Follows commands  Perception: Appears intact  Perseveration: Perseverates during conversation  Safety/Judgement: Decreased awareness of environment, Decreased awareness of need for assistance, Decreased awareness of need for safety, Decreased insight into deficits  Dysphagia Treatment:  Oral Assessment:  Oral Assessment  Labial: No impairment  Dentition: Edentulous  Oral Hygiene: Fair  Lingual: Decreased strength  Velum: No impairment  Mandible: No impairment  P.O. Trials:   Patient Position: HOB 60   Vocal quality prior to P.O.: No impairment   Consistency Presented: Thin liquid, Nectar thick liquid, Puree   How Presented: SLP-fed/presented, Cup/sip, Spoon, Straw       Bolus Acceptance: Impaired   Bolus Formation/Control: Impaired   Type of Impairment: Delayed, Mastication, Incomplete   Propulsion: Discoordination, Delayed (# of seconds)   Oral Residue: Greater than 50% of bolus, Pocketing   Initiation of Swallow: Delayed (# of seconds)   Laryngeal Elevation: Functional   Aspiration Signs/Symptoms: Clear throat   Pharyngeal Phase Characteristics: Audible swallow   Effective Modifications:  Alternate liquids/solids, Small sips and bites   Cues for Modifications: Maximal       Oral Phase Severity: Moderate   Pharyngeal Phase Severity : Mild    PAIN:  Pain level pre-treatment: 0/10   Pain level post-treatment: 0/10     After treatment:   ?              Patient left in no apparent distress sitting up in chair  ? Patient left in no apparent distress in bed  ? Call bell left within reach  ? Nursing notified  ? Caregiver present  ? Bed alarm activated      COMMUNICATION/EDUCATION:   ? Aspiration precautions; swallow safety; compensatory techniques  ? Patient unable to participate in education; education ongoing with staff  ? Posted safety precautions in patient's room.   ? Oral-motor/laryngeal strengthening exercises    Thank you for this referral,  Lucho Juan SLP  Time Calculation: 8 mins

## 2019-10-28 NOTE — PROGRESS NOTES
NUTRITION FOLLOW-UP    RECOMMENDATIONS/PLAN:   - Contine w/ POC  - Monitor labs/lytes, BM, PO intake, skin integrity, wt, fluid status    NUTRITION ASSESSMENT:   Client Update: 81 yrs old Male with UTI, hypernatremia, anemia, CHF, resp failure, penis lesions-[place] catheter attempted    FOOD/NUTRITION INTAKE   Diet Order:  Dysphagia, nectar thick   Supplements: none   Food Allergies: port, tomato  Average PO Intake:      Patient Vitals for the past 100 hrs:   % Diet Eaten   10/28/19 0820 10 %   10/27/19 2345 50 %   10/27/19 1315 65 %   10/27/19 0846 75 %   10/26/19 1234 20 %   10/26/19 1147 50 %   10/25/19 1821 25 %   10/25/19 0942 100 %   10/24/19 1915 75 %   10/24/19 1714 0 %   10/24/19 0922 50 %      Pertinent Medications:  [x] Reviewed;10%, protonix, KCl, pepcid, lopressor  Electrolyte Replacement Protocol: []K []Mg []PO4  Insulin:  []SSI  []Pre-meal   []Basal    []Drip  []None  Cultural/Oriental orthodox Food Preferences: None Identified    BIOCHEMICAL DATA & MEDICAL TESTS  Pertinent Labs:  [x] Reviewed; Na-147, glucose-157, GFR est AA-30, BUN-34 ANTHROPOMETRICS  Height: 5' 8\" (172.7 cm)       Weight: 96 kg (211 lb 10.3 oz)         BMI: 32.2 kg/m^2 obese (30%-39.9% BMI)   Adm Weight: 208 lbs                Weight change: +3 lbs  Adjusted Body Weight: 77 kg    NUTRITION-FOCUSED PHYSICAL ASSESSMENT  Skin: No PU  GI: +BM 10/27    NUTRITION PRESCRIPTION  Calories: 8269-8005 kcal/day based on 30-35 kcal/kg AdjBW  Protein:  59-78 g/day based on .6-.8 g/kg  CHO: 289-337 g/day based on 50% of total energy  Fluid: 0170-6334 ml/day based on 1 kcal/ml       NUTRITION DIAGNOSES:   1. At risk of inadequate oral intake related to chewing and swallowing difficulties as evidenced by altered diet    NUTRITION INTERVENTIONS:   INTERVENTIONS:        GOALS:  1. Continue w/ POC 1.  Encourage PO intake >75% by next review 4 days     LEARNING NEEDS (Diet,Supplementation, Food/Nutrient-Drug Interaction):   [] None Identified   [] Education provided/documented      Identified and patient: [] Declined   [x] Was not appropriate/indicated        NUTRITION MONITORING /EVALUATION:   Follow PO intake  Monitor wt  Monitor renal labs, electrolytes, fluid status  Monitor for additional supplement needs     Previous Recommendations Implemented: yes        Previous Goals Met:  yes -      [] Participated in Interdisciplinary Rounds    [x] Interdisciplinary Care Plan Reviewed  DISCHARGE NUTRITION RECOMMENDATIONS ADDRESSED:     [] To be determined closer to discharge    NUTRITION RISK:           [x] At risk                        [] Not currently at risk        Will follow-up per policy.   Wm Prather 1

## 2019-10-28 NOTE — PROGRESS NOTES
Met with Dr. Ariela May IDR patient will not be d/c today  Transition of care to SNF: [address] at [facility] is aware that patient will not need  Be ready for d/c states she can accommodate when ever he is ready    Communication to Patient/Family:  Met with patient and family, and they are agreeable to the transition plan. SNF/Rehab Transition:  Patient has been accepted to [facility] at [address] [facility],  [address] [place], [place] and meets criteria for admission. Patient will transported by medical transport and expected to leave at tomorrow    Communication to SNF/Rehab:  Bedside RN,VIKI, has been notified to update the transition plan to the facility and call report Report to [phone]. Discharge information has been updated on the AVS and sent via [facility] and/or CC link. Discharge instructions to be fax'd to facility at [phone] per requests     Please include all hard scripts for controlled substances, med rec and dc summary, and AVS in packet. Please medicate for pain prior to dc if possible and needed to help offset delay when patient first arrives to facility. Reviewed and confirmed with facility, [facility]  can manage the patient care needs for the following:     Willaim [employer] with (X) only those applicable:  Medication:  []Medications are available at the facility  []IV Antibiotics    []Controlled Substance  hard copies available sent.   []Weekly Labs    Equipment:  []CPAP/BiPAP  []Wound Vacuum  []Hernandez or Urinary Device  []PICC/Central Line  []Nebulizer  []Ventilator    Treatment:  []Isolation (for MRSA, VRE, etc.)  []Surgical Drain Management  []Tracheostomy Care  []Dressing Changes  []Dialysis with transportation  []PEG Care  []Oxygen  []Daily Weights for Heart Failure    Dietary:  []Any diet limitations  []Tube Feedings   []Total Parenteral Management (TPN)    Financial Resources:  []Medicaid Application Completed    []UAI Completed and copy given to pt/family    [x]A screening has previously been completed. []Level II Completed    [] Private pay individual who will not become   financially eligible for Medicaid within 6 months from admission to a [id number] facility. [] Individual refused to have screening conducted. []Medicaid Application Completed    []The screening denied because it was determined individual did not need/did not qualify for nursing facility level of care. [] Out of state residents seeking direct admission to a [address] facility. [] Individuals who are inpatients of an out of state hospital, or in state or out of state veterans/ hospital and seek direct admission to a [address] facility  [] Individuals who are pateints or residents of a state owned/operated facility that is licensed by Department of [employer] and seek direct admission to [address] [place] [address]  [] A screening not required for enrollment in [address] [address] [address] services as set out in [address] [place] [address] 47-  [] [facility]) staff shall perform screenings of the [facility] clients. Advanced Care Plan:  []Surrogate Decision Maker of Care  []POA  []Communicated Code Status and copy sent. Other:       Care Management Interventions  PCP Verified by CM:  Yes  Palliative Care Criteria Met (RRAT>21 & CHF Dx)?: Yes  Palliative Consult Recommended?: Yes  Mode of Transport at Discharge: BLS  Transition of Care Consult (CM Consult): Long Term Care  Current Support Network: Lives with Spouse, Nursing Facility(wife also lives at [address])  Discharge Location  Discharge Placement: Skilled nursing facility

## 2019-10-28 NOTE — PROGRESS NOTES
Problem: Falls - Risk of  Goal: *Absence of Falls  Description  Document Dmitri Juan Fall Risk and appropriate interventions in the flowsheet. Outcome: Progressing Towards Goal  Note:   Fall Risk Interventions:  Mobility Interventions: Communicate number of staff needed for ambulation/transfer    Mentation Interventions: Adequate sleep, hydration, pain control    Medication Interventions: Bed/chair exit alarm    Elimination Interventions: Call light in reach              Problem: Patient Education: Go to Patient Education Activity  Goal: Patient/Family Education  Outcome: Progressing Towards Goal     Problem: Pressure Injury - Risk of  Goal: *Prevention of pressure injury  Description  Document Oseas Scale and appropriate interventions in the flowsheet.   Outcome: Progressing Towards Goal  Note:   Pressure Injury Interventions:  Sensory Interventions: Assess need for specialty bed    Moisture Interventions: Absorbent underpads    Activity Interventions: PT/OT evaluation, Increase time out of bed    Mobility Interventions: PT/OT evaluation, Pressure redistribution bed/mattress (bed type)    Nutrition Interventions: Document food/fluid/supplement intake    Friction and Shear Interventions: Apply protective barrier, creams and emollients                Problem: Patient Education: Go to Patient Education Activity  Goal: Patient/Family Education  Outcome: Progressing Towards Goal     Problem: Pain  Goal: *Control of Pain  Outcome: Progressing Towards Goal     Problem: Patient Education: Go to Patient Education Activity  Goal: Patient/Family Education  Outcome: Progressing Towards Goal     Problem: Heart Failure: Day 2  Goal: Activity/Safety  Outcome: Progressing Towards Goal  Goal: Consults, if ordered  Outcome: Progressing Towards Goal  Goal: Diagnostic Test/Procedures  Outcome: Progressing Towards Goal  Goal: Nutrition/Diet  Outcome: Progressing Towards Goal  Goal: Discharge Planning  Outcome: Progressing Towards Goal  Goal: Medications  Outcome: Progressing Towards Goal  Goal: Respiratory  Outcome: Progressing Towards Goal  Goal: Treatments/Interventions/Procedures  Outcome: Progressing Towards Goal  Goal: *Oxygen saturation within defined limits  Outcome: Progressing Towards Goal  Goal: *Hemodynamically stable  Outcome: Progressing Towards Goal  Goal: *Optimal pain control at patient's stated goal  Outcome: Progressing Towards Goal  Goal: *Anxiety reduced or absent  Outcome: Progressing Towards Goal  Goal: *Demonstrates progressive activity  Outcome: Progressing Towards Goal     Problem: Patient Education: Go to Patient Education Activity  Goal: Patient/Family Education  Outcome: Progressing Towards Goal     Problem: Patient Education: Go to Patient Education Activity  Goal: Patient/Family Education  Outcome: Progressing Towards Goal     Problem: Gas Exchange - Impaired  Goal: *Absence of hypoxia  Outcome: Progressing Towards Goal     Problem: Patient Education: Go to Patient Education Activity  Goal: Patient/Family Education  Outcome: Progressing Towards Goal     Problem: Dysphagia (Adult)  Goal: *Speech Goal: (INSERT TEXT)  Outcome: Progressing Towards Goal     Problem: Patient Education: Go to Patient Education Activity  Goal: Patient/Family Education  Outcome: Progressing Towards Goal     Problem: Aspiration - Risk of  Goal: *Absence of aspiration  Outcome: Progressing Towards Goal     Problem: Patient Education: Go to Patient Education Activity  Goal: Patient/Family Education  Outcome: Progressing Towards Goal     Problem: Nutrition Deficit  Goal: *Optimize nutritional status  Outcome: Progressing Towards Goal     Problem: Heart Failure: Discharge Outcomes  Goal: *Demonstrates ability to perform prescribed activity without shortness of breath or discomfort  Outcome: Progressing Towards Goal  Goal: *Left ventricular function assessment completed prior to or during stay, or planned for post-discharge  Outcome: Progressing Towards Goal  Goal: *ACEI prescribed if LVEF less than 40% and no contraindications or ARB prescribed  Outcome: Progressing Towards Goal  Goal: *Verbalizes understanding and describes prescribed diet  Outcome: Progressing Towards Goal  Goal: *Verbalizes understanding/describes prescribed medications  Outcome: Progressing Towards Goal  Goal: *Describes available resources and support systems  Description  (eg: Home Health, Palliative Care, Advanced Medical Directive)  Outcome: Progressing Towards Goal  Goal: *Describes smoking cessation resources  Outcome: Progressing Towards Goal  Goal: *Understands and describes signs and symptoms to report to providers(Stroke Metric)  Outcome: Progressing Towards Goal  Goal: *Describes/verbalizes understanding of follow-up/return appt  Description  (eg: to physicians, diabetes treatment coordinator, and other resources  Outcome: Progressing Towards Goal  Goal: *Describes importance of continuing daily weights and changes to report to physician  Outcome: Progressing Towards Goal

## 2019-10-28 NOTE — PROGRESS NOTES
Problem: Falls - Risk of  Goal: *Absence of Falls  Description  Document Trepk Bullion Fall Risk and appropriate interventions in the flowsheet. Outcome: Progressing Towards Goal  Note:   Fall Risk Interventions:  Mobility Interventions: Patient to call before getting OOB, Bed/chair exit alarm, PT Consult for mobility concerns, Mechanical lift    Mentation Interventions: Adequate sleep, hydration, pain control, Bed/chair exit alarm, Door open when patient unattended, More frequent rounding, Reorient patient, Room close to nurse's station    Medication Interventions: Bed/chair exit alarm, Patient to call before getting OOB, Teach patient to arise slowly, Evaluate medications/consider consulting pharmacy    Elimination Interventions: Call light in reach, Bed/chair exit alarm, Patient to call for help with toileting needs, Toileting schedule/hourly rounds              Problem: Patient Education: Go to Patient Education Activity  Goal: Patient/Family Education  Outcome: Progressing Towards Goal     Problem: Pressure Injury - Risk of  Goal: *Prevention of pressure injury  Description  Document [place] Scale and appropriate interventions in the flowsheet. Outcome: Progressing Towards Goal  Note:   Pressure Injury Interventions:  Sensory Interventions: Assess need for specialty bed, Discuss PT/OT consult with provider, Float heels, Keep linens dry and wrinkle-free, Maintain/enhance activity level, Minimize linen layers, Monitor skin under medical devices, Pad between skin to skin, Turn and reposition approx.  every two hours (pillows and wedges if needed)    Moisture Interventions: Absorbent underpads, Apply protective barrier, creams and emollients, Check for incontinence Q2 hours and as needed, Limit adult briefs, Minimize layers    Activity Interventions: PT/OT evaluation, Increase time out of bed    Mobility Interventions: PT/OT evaluation, Pressure redistribution bed/mattress (bed type), HOB 30 degrees or less    Nutrition Interventions: Document food/fluid/supplement intake    Friction and Shear Interventions: Apply protective barrier, creams and emollients, Feet elevated on foot rest, Foam dressings/transparent film/skin sealants, HOB 30 degrees or less, Lift sheet, Minimize layers, Transferring/repositioning devices                Problem: Patient Education: Go to Patient Education Activity  Goal: Patient/Family Education  Outcome: Progressing Towards Goal     Problem: Pain  Goal: *Control of Pain  Outcome: Progressing Towards Goal     Problem: Patient Education: Go to Patient Education Activity  Goal: Patient/Family Education  Outcome: Progressing Towards Goal     Problem: Patient Education: Go to Patient Education Activity  Goal: Patient/Family Education  Outcome: Progressing Towards Goal     Problem: Patient Education: Go to Patient Education Activity  Goal: Patient/Family Education  Outcome: Progressing Towards Goal     Problem: Dysphagia (Adult)  Goal: *Speech Goal: (INSERT TEXT)  Outcome: Progressing Towards Goal     Problem: Patient Education: Go to Patient Education Activity  Goal: Patient/Family Education  Outcome: Progressing Towards Goal     Problem: Aspiration - Risk of  Goal: *Absence of aspiration  Outcome: Progressing Towards Goal     Problem: Patient Education: Go to Patient Education Activity  Goal: Patient/Family Education  Outcome: Progressing Towards Goal     Problem: Nutrition Deficit  Goal: *Optimize nutritional status  Outcome: Progressing Towards Goal

## 2019-10-28 NOTE — DIABETES MGMT
GLYCEMIC CONTROL PROGRESS NOTE:    - known h/o T2DM, HbA1C not within recommended range for age + comorbids on basal home regimen  - BG out of target range non-ICU: < 180 mg/dL  -TDD = 29 (5 Lantus + 12 (4) MTI + 12 units - Humalog Very Insulin Resistant Corrective Coverage)  - PPG out of target range, recommend increase mealtime insulin    *Humalog 7 units qac   *- Humalog Very Insulin Resistant Corrective Coverage changed to [facility] & HS from Q 6 hours    Recent Glucose Results:   Lab Results   Component Value Date/Time     (H) 10/28/2019 03:08 AM    GLUCPOC 195 (H) 10/28/2019 11:13 AM    GLUCPOC 136 (H) 10/28/2019 06:13 AM    GLUCPOC 193 (H) 10/28/2019 01:22 AM     Davie Francisco MS, RN, CDE  Glycemic Control Team  [phone]  Pager [phone] (M-TH 8:00-4:30P)  *After Hours pager [phone]

## 2019-10-28 NOTE — PROGRESS NOTES
NAME: Olivia Arevalo  MRN: [id number]  AGE: 81 y.o., : 1935  DATE OF ADMISSION: 10/16/2019  Rounding Date and Time: 10/28/2019 0900    Full Code  Advance Directives: Scanned and in emr completed. Vitals:  height is 5' 8\" (1.727 m) and weight is 96 kg (211 lb 10.3 oz). His temperature is 97.5 °F (36.4 °C). His blood pressure is 164/84 and his pulse is 70. His respiration is 16 and oxygen saturation is 100%. Intake/Output Summary (Last 24 hours) at 10/28/2019 0939  Last data filed at 10/28/2019 0820  Gross per 24 hour   Intake 846.25 ml   Output --   Net 846.25 ml     Activity:Advance as tolerated     Monitoring daily Interventions:  1. I&Os  2. Daily Weights  3. Medication Compliance      Problem: Heart Failure  Goal: Consults, if ordered  Outcome: Progressing Towards Goal  Note:   CNS consult for HF education  Palliative care on board  Goal: Diagnostic Test/Procedures  Outcome: Progressing Towards Goal  Goal: Nutrition/Diet  Outcome: Progressing Towards Goal  Note:   Mr. Vj Garay states that his wife cooks for him at home. She is not available at this time for discussion of cardiac diet. Goal: Medications  Outcome: Progressing Towards Goal  Goal: Respiratory  Outcome: Progressing Towards Goal  Goal: *Oxygen saturation within defined limits  Outcome: Progressing Towards Goal  Goal: *Hemodynamically stable  Outcome: Progressing Towards Goal  Goal: *Optimal pain control at patient's stated goal  Outcome: Progressing Towards Goal     Problem: Falls - Risk of  Goal: *Absence of Falls  Description  Document Mary Du Fall Risk and appropriate interventions in the flowsheet.   Outcome: Progressing Towards Goal  Note:   Fall Risk Interventions:  Mobility Interventions: Patient to call before getting OOB, Bed/chair exit alarm, PT Consult for mobility concerns, Mechanical lift    Mentation Interventions: Adequate sleep, hydration, pain control, Bed/chair exit alarm, Door open when patient unattended, More frequent rounding, Reorient patient, Room close to nurse's station    Medication Interventions: Bed/chair exit alarm, Patient to call before getting OOB, Teach patient to arise slowly, Evaluate medications/consider consulting pharmacy    Elimination Interventions: Call light in reach, Bed/chair exit alarm, Patient to call for help with toileting needs, Toileting schedule/hourly rounds           Education provided included:   Medication review including s/s to monitor   Dietary modification (low sodium intake and fluid restrictions)  \"Living with HF\" packet: reviewed stop light, weight tracker, medication schedules, daily weights energy conservation, exercise and rest, and lifestyle modification    Electronically signed by:  Lizbet Reveles, MSN, APRN AGCNS-BC

## 2019-10-28 NOTE — PROGRESS NOTES
6922  Assumed care of patient at this time, assessment complete. Patient oriented to self. Denies SOB and chest pain. Patient lungs clear bilaterally. Cap refilled  less than 3 seconds. Patient denies numbness and tingling to all extremities. Stated pain 0/10. Patient has 22G IV to right forearm. TEDs and SCDs applied to BLE. Patient encouraged to use IS. Patient verbalized understanding. Ice pack in place, call light and personal items in reach, bed in low position and locked, will continue to monitor patient. Fall risk arm band in place. 120 [facility] pathologist is working with pt at this time. 1228  Collected urine sample and took to lab for urinalysis and culture. 1300  Patients daughter at bedside. 1650  Patient resting in bed, family member at bedside. 1907  Patient had uneventful shift. No signs or symptoms of distress. On coming nurse made aware that patient is on bedrest, condom catheter in place and pt is voiding sufficient amounts. Plan for patient to d/c.

## 2019-10-28 NOTE — ROUTINE PROCESS
1933 
Bedside in verbal shift change report given to [address] (oncoming nurse) by Ita Tillman RN (off going nurse). Report included the following information: SBAR, KARDEX, MAR and recent results.

## 2019-10-28 NOTE — PROGRESS NOTES
0548 Pt HGB returned 6.8. Pt has fluids infusing. Orders received to pause fluids and redraw cbc per Dr. Sindy Meehan.      4937 Redraw of HGB is 7.5.     0730 Bedside shift change report given to Renato Larsen RN (oncoming nurse) by Wandy Young RN (offgoing nurse). Report included the following information SBAR, Kardex and Recent Results.

## 2019-10-28 NOTE — DISCHARGE INSTRUCTIONS
Heart Failure Education and Support Group is an interdisciplinary group that provides education that you need to help manage your heart failure. It gives you the opportunity to learn and share with others in your community. You are scheduled for November 21. 2019. Meeting will be held on the grounds of [facility] in [facility] [address], \"Auditorium [address]\" from 5:30-7:30 p.m. Please call Devante Leo at [phone] if you are unable to make this appointment to reschedule.

## 2019-10-28 NOTE — PROGRESS NOTES
Hospitalist Progress Note    Patient: Vero George MRN: [id number]  CSN: [id number]    YOB: 1935  Age: 81 y.o.   Sex: male    DOA: 10/16/2019 LOS:  LOS: 12 days          Chief Complaint:    hypernatremia      Assessment/Plan     82 yo CVA and dementia patient admitted to ICU for ac resp failure from nursing home  Improved and weaned from bipap, now on tele floor  Concern about intermittent tolerance of pureed with advanced dementia and inability to follow cues    Recent issue is pain with urination  Has completed extensive course of IV abx  Will repeat UA-it seems more related to penile lesions, prior catheter maybe, and sores on scrotum, which are actually better today than prior-valtrex has been started also for possible herpetic lesion on penis     Hypernatremia-improved, stop IVF    Penile lesions-as above    Anemia-stable with Hgb above 7     Had nuclear stress test -showed ischemia-he is on eliquis at NH, resumed      Acute resp failure -- improved-acute on chronic systolic and diastolic CHF-after IV lasix change to PO     Dysphagia-modified diet     Advanced dementia-full code      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      IDDM with renal complications-add in lantus and lispro TID     Asp pneumonia-treated     Anemia due to CKD --stable Hgb     NSTEMI -as above    Repeat BMP in am  Watch for skin lesions and pain surrounding these  Return to NH tomorrow       Disposition :  Patient Active Problem List   Diagnosis Code    Acute exacerbation of CHF (congestive heart failure) ([address] I50.9    Dementia ([address] F03.90    History of CVA (cerebrovascular accident) Z86.73    Insulin dependent diabetes mellitus with complications ([address] V39.4, Z79.4    UTI (urinary tract infection) N39.0    Acute respiratory failure ([address] J96.00    Acute on chronic combined systolic and diastolic CHF (congestive heart failure) ([address] I50.43    NSTEMI (non-ST elevated myocardial infarction) ([address] I21.4    CKD (chronic kidney disease) stage 4, GFR 15-29 ml/min ([facility]) N18.4       Subjective:    Pain and crying still with urination  CNA notes scrotal lesions and penile lesions are much better today  He cries in pain when touched there    Review of systems:    Dementia, UTO      Vital signs/Intake and Output:  Visit Vitals  /84 (BP 1 Location: Right arm, BP Patient Position: At rest;Supine)   Pulse 70   Temp 97.5 °F (36.4 °C)   Resp 16   Ht 5' 8\" (1.727 m)   Wt 96 kg (211 lb 10.3 oz)   SpO2 100%   BMI 32.18 kg/m²     Current Shift:  No intake/output data recorded.   Last three shifts:  10/26 1901 - 10/28 0700  In: 606.3 [P.O.:360; I.V.:246.3]  Out: 4 [Urine:4]    Exam:    General: elderly demented BM, NAD  Head/Neck: NCAT, supple, No masses, No lymphadenopathy  CVS:Regular rate and rhythm, no M/R/G, S1/S2 heard, no thrill  Lungs:Clear to auscultation bilaterally, no wheezes, rhonchi, or rales  Abdomen: Soft, Nontender, No distention, Normal Bowel sounds, No hepatomegaly  Extremities: ulcerated lesion shallow end of penis, uncircumcised penis with retractable foreskin, and no redness or inflammation noted, scrotum without cellulitis noted  Neuro:grossly normal   Psych:appropriate                Labs: Results:       Chemistry Recent Labs     10/28/19  0308 10/27/19  0400 10/26/19  0455   * 139* 142*   * 150* 150*   K 3.6 3.3* 3.6    111 112*   CO2 36* 36* 35*   BUN 34* 38* 39*   CREA 2.50* 2.77* 2.86*   CA 7.9* 8.1* 8.6   AGAP 2* 3 3   BUCR 14 14 14   AP 69 69  --    TP 6.5 6.5  --    ALB 2.1* 2.2*  --    GLOB 4.4* 4.3*  --    AGRAT 0.5* 0.5*  --       CBC w/Diff Recent Labs     10/28/19  0653 10/28/19  0308 10/27/19  1755 10/27/19  0934 10/27/19  0400   WBC 7.8 7.6  --  7.5 7.7   RBC 2.73* 2.49*  --  2.57* 2.57*   HGB 7.5* 6.8* 7.9* 7.1* 7.0*   HCT 25.0* 23.0* 25.7* 23.6* 23.6*    187  --  173 172   GRANS  --  65  --  71 67   LYMPH  --  24  --  20* 21   EOS  --  4  --  3 4      Cardiac Enzymes No results for input(s): CPK, CKND1, ISIDORO in the last 72 hours. No lab exists for component: CKRMB, TROIP   Coagulation No results for input(s): PTP, INR, APTT, INREXT in the last 72 hours. Lipid Panel No results found for: CHOL, CHOLPOCT, CHOLX, CHLST, CHOLV, 505969, HDL, HDLP, LDL, LDLC, DLDLP, 697529, VLDLC, VLDL, TGLX, TRIGL, TRIGP, TGLPOCT, CHHD, CHHDX   BNP No results for input(s): BNPP in the last 72 hours.    Liver Enzymes Recent Labs     10/28/19  0308   TP 6.5   ALB 2.1*   AP 69   SGOT 15      Thyroid Studies Lab Results   Component Value Date/Time    TSH 0.45 10/18/2019 04:10 AM        Procedures/imaging: see electronic medical records for all procedures/Xrays and details which were not copied into this note but were reviewed prior to creation of Stella Petersen MD

## 2019-10-29 LAB
ANION GAP SERPL CALC-SCNC: 3 MMOL/L (ref 3–18)
BASOPHILS # BLD: 0 K/UL (ref 0–0.1)
BASOPHILS NFR BLD: 0 % (ref 0–2)
BUN SERPL-MCNC: 31 MG/DL (ref 7–18)
BUN/CREAT SERPL: 15 (ref 12–20)
CALCIUM SERPL-MCNC: 8.4 MG/DL (ref 8.5–10.1)
CHLORIDE SERPL-SCNC: 107 MMOL/L (ref 100–111)
CO2 SERPL-SCNC: 35 MMOL/L (ref 21–32)
CREAT SERPL-MCNC: 2.13 MG/DL (ref 0.6–1.3)
DIFFERENTIAL METHOD BLD: ABNORMAL
EOSINOPHIL # BLD: 0.3 K/UL (ref 0–0.4)
EOSINOPHIL NFR BLD: 4 % (ref 0–5)
ERYTHROCYTE [DISTWIDTH] IN BLOOD BY AUTOMATED COUNT: 14.9 % (ref 11.6–14.5)
GLUCOSE BLD STRIP.AUTO-MCNC: 123 MG/DL (ref 70–110)
GLUCOSE BLD STRIP.AUTO-MCNC: 153 MG/DL (ref 70–110)
GLUCOSE BLD STRIP.AUTO-MCNC: 162 MG/DL (ref 70–110)
GLUCOSE BLD STRIP.AUTO-MCNC: 264 MG/DL (ref 70–110)
GLUCOSE SERPL-MCNC: 119 MG/DL (ref 74–99)
HCT VFR BLD AUTO: 25.1 % (ref 36–48)
HCT VFR BLD AUTO: 26.1 % (ref 36–48)
HCT VFR BLD AUTO: 26.2 % (ref 36–48)
HGB BLD-MCNC: 7.6 G/DL (ref 13–16)
HGB BLD-MCNC: 7.9 G/DL (ref 13–16)
HGB BLD-MCNC: 8.1 G/DL (ref 13–16)
LYMPHOCYTES # BLD: 1.5 K/UL (ref 0.9–3.6)
LYMPHOCYTES NFR BLD: 21 % (ref 21–52)
MCH RBC QN AUTO: 27.5 PG (ref 24–34)
MCHC RBC AUTO-ENTMCNC: 30.3 G/DL (ref 31–37)
MCV RBC AUTO: 90.9 FL (ref 74–97)
MONOCYTES # BLD: 0.6 K/UL (ref 0.05–1.2)
MONOCYTES NFR BLD: 8 % (ref 3–10)
NEUTS SEG # BLD: 4.7 K/UL (ref 1.8–8)
NEUTS SEG NFR BLD: 67 % (ref 40–73)
PLATELET # BLD AUTO: 189 K/UL (ref 135–420)
PMV BLD AUTO: 11.2 FL (ref 9.2–11.8)
POTASSIUM SERPL-SCNC: 3.5 MMOL/L (ref 3.5–5.5)
RBC # BLD AUTO: 2.76 M/UL (ref 4.7–5.5)
SODIUM SERPL-SCNC: 145 MMOL/L (ref 136–145)
WBC # BLD AUTO: 7.2 K/UL (ref 4.6–13.2)

## 2019-10-29 PROCEDURE — 74011250637 HC RX REV CODE- 250/637: Performed by: FAMILY MEDICINE

## 2019-10-29 PROCEDURE — 77010033678 HC OXYGEN DAILY

## 2019-10-29 PROCEDURE — 36415 COLL VENOUS BLD VENIPUNCTURE: CPT

## 2019-10-29 PROCEDURE — 74011250636 HC RX REV CODE- 250/636: Performed by: HOSPITALIST

## 2019-10-29 PROCEDURE — 85018 HEMOGLOBIN: CPT

## 2019-10-29 PROCEDURE — 74011250637 HC RX REV CODE- 250/637: Performed by: HOSPITALIST

## 2019-10-29 PROCEDURE — 80048 BASIC METABOLIC PNL TOTAL CA: CPT

## 2019-10-29 PROCEDURE — 65660000000 HC RM CCU STEPDOWN

## 2019-10-29 PROCEDURE — 74011250637 HC RX REV CODE- 250/637: Performed by: INTERNAL MEDICINE

## 2019-10-29 PROCEDURE — 82962 GLUCOSE BLOOD TEST: CPT

## 2019-10-29 PROCEDURE — 85025 COMPLETE CBC W/AUTO DIFF WBC: CPT

## 2019-10-29 PROCEDURE — 74011636637 HC RX REV CODE- 636/637: Performed by: HOSPITALIST

## 2019-10-29 RX ORDER — HYDRALAZINE HYDROCHLORIDE 25 MG/1
25 TABLET, FILM COATED ORAL 3 TIMES DAILY
Status: DISCONTINUED | OUTPATIENT
Start: 2019-10-29 | End: 2019-10-30 | Stop reason: HOSPADM

## 2019-10-29 RX ORDER — INSULIN GLARGINE 100 [IU]/ML
INJECTION, SOLUTION SUBCUTANEOUS
Qty: 1 VIAL | Refills: 0 | Status: SHIPPED
Start: 2019-10-29

## 2019-10-29 RX ORDER — METOPROLOL TARTRATE 25 MG/1
25 TABLET, FILM COATED ORAL EVERY 12 HOURS
Qty: 10 TAB | Refills: 0 | Status: SHIPPED
Start: 2019-10-29

## 2019-10-29 RX ORDER — INSULIN LISPRO 100 [IU]/ML
INJECTION, SOLUTION INTRAVENOUS; SUBCUTANEOUS
Qty: 1 VIAL | Refills: 0 | Status: SHIPPED
Start: 2019-10-29

## 2019-10-29 RX ORDER — VALACYCLOVIR HYDROCHLORIDE 500 MG/1
500 TABLET, FILM COATED ORAL 2 TIMES DAILY
Qty: 12 TAB | Refills: 0 | Status: SHIPPED
Start: 2019-10-29

## 2019-10-29 RX ORDER — FUROSEMIDE 20 MG/1
40 TABLET ORAL DAILY
Qty: 2 TAB | Refills: 0 | Status: SHIPPED
Start: 2019-10-29

## 2019-10-29 RX ORDER — IPRATROPIUM BROMIDE AND ALBUTEROL SULFATE 2.5; .5 MG/3ML; MG/3ML
3 SOLUTION RESPIRATORY (INHALATION)
Qty: 30 NEBULE | Refills: 0 | Status: SHIPPED
Start: 2019-10-29

## 2019-10-29 RX ORDER — ISOSORBIDE DINITRATE 20 MG/1
20 TABLET ORAL 3 TIMES DAILY
Qty: 10 TAB | Refills: 0 | Status: SHIPPED
Start: 2019-10-29

## 2019-10-29 RX ORDER — NYSTATIN 100000 [USP'U]/G
POWDER TOPICAL 3 TIMES DAILY
Qty: 1 BOTTLE | Refills: 0 | Status: SHIPPED
Start: 2019-10-29

## 2019-10-29 RX ADMIN — FAMOTIDINE 20 MG: 20 TABLET ORAL at 08:29

## 2019-10-29 RX ADMIN — FUROSEMIDE 40 MG: 10 INJECTION, SOLUTION INTRAMUSCULAR; INTRAVENOUS at 08:28

## 2019-10-29 RX ADMIN — NYSTATIN: 100000 POWDER TOPICAL at 08:30

## 2019-10-29 RX ADMIN — ISOSORBIDE DINITRATE 20 MG: 20 TABLET ORAL at 08:29

## 2019-10-29 RX ADMIN — INSULIN LISPRO 4 UNITS: 100 INJECTION, SOLUTION INTRAVENOUS; SUBCUTANEOUS at 14:24

## 2019-10-29 RX ADMIN — APIXABAN 2.5 MG: 2.5 TABLET, FILM COATED ORAL at 22:56

## 2019-10-29 RX ADMIN — APIXABAN 2.5 MG: 2.5 TABLET, FILM COATED ORAL at 08:29

## 2019-10-29 RX ADMIN — NYSTATIN: 100000 POWDER TOPICAL at 16:41

## 2019-10-29 RX ADMIN — HYDRALAZINE HYDROCHLORIDE 25 MG: 25 TABLET, FILM COATED ORAL at 22:56

## 2019-10-29 RX ADMIN — INSULIN LISPRO 3 UNITS: 100 INJECTION, SOLUTION INTRAVENOUS; SUBCUTANEOUS at 14:24

## 2019-10-29 RX ADMIN — ATROPA BELLADONNA AND OPIUM 1 SUPPOSITORY: 16.2; 3 SUPPOSITORY RECTAL at 08:29

## 2019-10-29 RX ADMIN — ISOSORBIDE DINITRATE 20 MG: 20 TABLET ORAL at 16:40

## 2019-10-29 RX ADMIN — HYDRALAZINE HYDROCHLORIDE 25 MG: 25 TABLET, FILM COATED ORAL at 16:40

## 2019-10-29 RX ADMIN — VALACYCLOVIR HYDROCHLORIDE 500 MG: 500 TABLET, FILM COATED ORAL at 08:29

## 2019-10-29 RX ADMIN — METOPROLOL TARTRATE 25 MG: 25 TABLET, FILM COATED ORAL at 08:29

## 2019-10-29 RX ADMIN — ISOSORBIDE DINITRATE 20 MG: 20 TABLET ORAL at 22:55

## 2019-10-29 RX ADMIN — Medication 10 ML: at 23:06

## 2019-10-29 RX ADMIN — ATORVASTATIN CALCIUM 40 MG: 20 TABLET, FILM COATED ORAL at 08:29

## 2019-10-29 RX ADMIN — INSULIN LISPRO 2 UNITS: 100 INJECTION, SOLUTION INTRAVENOUS; SUBCUTANEOUS at 22:54

## 2019-10-29 RX ADMIN — HYDRALAZINE HYDROCHLORIDE 25 MG: 25 TABLET, FILM COATED ORAL at 08:29

## 2019-10-29 RX ADMIN — METOPROLOL TARTRATE 25 MG: 25 TABLET, FILM COATED ORAL at 22:56

## 2019-10-29 RX ADMIN — VALACYCLOVIR HYDROCHLORIDE 500 MG: 500 TABLET, FILM COATED ORAL at 22:56

## 2019-10-29 RX ADMIN — INSULIN LISPRO 6 UNITS: 100 INJECTION, SOLUTION INTRAVENOUS; SUBCUTANEOUS at 16:40

## 2019-10-29 RX ADMIN — INSULIN LISPRO 4 UNITS: 100 INJECTION, SOLUTION INTRAVENOUS; SUBCUTANEOUS at 08:28

## 2019-10-29 RX ADMIN — INSULIN LISPRO 4 UNITS: 100 INJECTION, SOLUTION INTRAVENOUS; SUBCUTANEOUS at 16:40

## 2019-10-29 RX ADMIN — NYSTATIN: 100000 POWDER TOPICAL at 23:06

## 2019-10-29 RX ADMIN — INSULIN GLARGINE 5 UNITS: 100 INJECTION, SOLUTION SUBCUTANEOUS at 22:54

## 2019-10-29 NOTE — DISCHARGE SUMMARY
[address]    Name:  Kenyatta Torres  MR#:   [id number]  :  1935  ACCOUNT #:  [de-identified]  ADMIT DATE:  10/16/2019  DISCHARGE DATE:  10/29/2019    DISCHARGE DATE:  10/29/2019. DISCHARGE DIAGNOSES:  1. Acute respiratory failure, resolved. 2.  Acute diastolic heart failure, improved. 3.  Advanced dementia with bed-bound state. 4.  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 5.  Insulin-dependent diabetes mellitus with renal disease. 6.  Aspiration pneumonia, treated. 7.  Anemia due to chronic kidney disease. 8.  Non-ST-elevation myocardial infarction. 9.  Hypernatremia, improved. 10.  Penile lesions. 11.  Dysphagia, on modified diet. HOSPITAL CONSULTANTS:  Dr. Davi Romero, Cardiology; Dr. Isac Keller, [facility] Pulmonology. HOSPITAL SUMMARY:  The patient is 81years old. He is well known to the medical service. He lives at a nursing facility as a full-time patient, he is bed-bound. He has advanced dementia. He has a previous history of stroke with left-sided weakness. He has chronic systolic and diastolic heart failure. He is on modified diet for dysphagia due to history of stroke as well as dementia that is advanced. When he came into the hospital, it was for worsening shortness of breath, and he required admission to the ICU on BiPap. He is treated for acute CHF exacerbation, possible aspiration pneumonia, acute kidney disease, possible UTI, and elevation in his troponin consistent with possible non-ST elevation MI. The patient stabilized and improved enough to be able to transition up to the floor. He has had extensive treatment and evaluation during this stay. From hematologic standpoint, he is chronically anemic but no evidence for bleeding. His most recent H and H are 7.6 and 25.1. He has not received a blood transfusion during this stay. There has been no evidence for acute bleeding.   He had a stool occult blood test on 10/25 that was negative. Urinalysis was slightly suspicious for infection. He has been on antibiotics since admission. Initial urine culture, no growth. Repeat urine culture from the 28th is pending as he continues to have some dysuria, but it looks like this dysuria complaint is mostly due to urine on a penile lesion and scrotal breakdown that is causing him pain, so he has been started on Valtrex for possible viral lesion on the end of the penis and nystatin for possible yeast and that seems to have improved his symptoms already in the last 2 days. Chronic kidney disease is stable with a GFR of 36. His potassium is stable and in normal range. His sodium had been elevated from the 22nd to the 27th with gentle hydration. His sodium has normalized and that is better. As far as infection, he has been treated and completed a full course of antibiotics for possible aspiration pneumonia. His last chest x-ray was on 10/21 and that showed worsening pulmonary edema pattern versus superimposed infection. There are no respiratory issues at this point. He is not requiring BiPap or extensive O2 support nor regular nebulizer treatments. He has been seen and followed by Speech Therapy during this stay. He had a modified barium that showed moderate laryngeal penetration without aspiration with nectar thick barium. He is on modified diet. He is tolerating that well as long as he is fed closely and safely. His blood sugars are between 119 and 157. He had a CT scan of his head on admission to the hospital that showed no acute intracranial abnormalities. He has had an echo repeated during this hospital stay that shows an EF of 29-07%, moderate systolic dysfunction to chronic systolic and diastolic heart failure. He actually also underwent a nuclear stress test that was positive for ischemia and infarction.   Cardiology followed him during this stay, and they recommended medical management of this considering his advanced dementia. The family has been made aware of all of his current issues and treatment. He is currently back on his anticoagulation for history of PE. He is on Isordil three times daily for recent non-STEMI with positive troponin. He is back on daily Lasix. His H and H are stable. There is no evidence for respiratory insufficiency. He is at his baseline mentation. At this point, he is stable to return to the nursing facility once arrangements are made. PHYSICAL EXAMINATION:  GENERAL:  Today, he is awake and alert, being fed. He is at his usual baseline mentation. He is talking. He is demented pleasantly, no acute distress. VITAL SIGNS:  His blood pressure is 149/78, pulse 59, temperature 97.8, a respiratory rate 18, SAO2 100% on 1 liter nasal cannula. LUNGS:  Clear anteriorly. CARDIAC:  Regular rate and rhythm. No murmur. ABDOMEN:  Obese, soft, nontender. EXTREMITIES:  Lower extremities, chronic stasis edema changes. Distal pulses weak but palpable. Feet are warm. LABS:  Most recent H and H again 7.6 and 25.1, platelet count and white count are within normal limits. BUN and creatinine 31 and 2.13. Sodium, potassium, and chloride are within normal limits. DISCHARGE MEDICATIONS:  Medications for his discharge include the followin. DuoNeb every 4 hours as needed for shortness of breath or wheezing. 2.  Lantus 5 units subcu at night. 3.  Humalog lispro 4 units t.i.d. premeal.  4.  Isordil 20 mg three times daily. 5.  Lopressor 25 mg twice daily. 6.  Nystatin powder to groin and scrotum three times daily for 1 week. 7.  Valtrex 500 mg twice daily for six more days. 8.  Lasix 40 mg daily. 9.  Tylenol 650 every 8 hours as needed. 10.  Albuterol nebs every 4 hours as needed. 11.  Uroxatral 10 mg daily. 12.  Alphagan 1 drop to both eyes twice daily. 13.  Singulair 10 mg daily. 14.  Lyrica 75 mg daily. 15.  Zantac 150 mg twice daily.   16.  Eliquis 2.5 mg twice daily. 17.  Lipitor 40 mg at night. 18.  Hydralazine 25 mg three times daily. The patient have a flu vaccine if needed before he leaves the facility. He is a full code status and that discussion was held with the family extensively during this stay. They want him to remain a full code. He is on a dysphagia puree diet with nectar-thick liquids, mildly thick food, no straws and he is tolerating that fine at this point. Prognosis overall of course at 81years old with advanced medical issues and dementia is very poor, return to the skilled nursing facility is most appropriate at this time. Consideration of transition into hospice care would also be a very reasonable consideration. Defer that to the family and physician at the facility. I have spent 45 minutes on discharge time today.     MD FERNANDO [email or web address]  D:  10/29/2019 9:53  T:  10/29/2019 10:44  JOB #:  [id number]

## 2019-10-29 NOTE — PROGRESS NOTES
5399 Received report from Mariah Brown RN. Assumed pt care at this time. 4368 Assessment completed at this time. Fed patient breakfast. Ate about 50% and 400ml of fluids. 1200 Removed pt IV and tele monitor due to pt discharge and transfer. 56 Notified transfer facility could not take pt today. Pt to transfer on 10/30 at 1300.     1945 Bedside and Verbal shift change report given to Pam DORANTES RN  by Grciel Kaufman RN. Report included the following information SBAR, Intake/Output, MAR and Recent Results. Pt in no distress,call bell within reach, and gripper socks on.

## 2019-10-29 NOTE — DIABETES MGMT
GLYCEMIC CONTROL PROGRESS NOTE:    - known h/o T2DM, HbA1C not within recommended range for age + comorbids on basal home regimen  - BG out of target range non-ICU: < 180 mg/dL  -TDD = 38 (5 Lantus + 12 (4) MTI + 21 units - Humalog Very Insulin Resistant Corrective Coverage)  - PPG out of target range, recommend increase mealtime insulin    *Humalog 8 units qac   *- Humalog Normal Insulin Sensitivity Corrective Coverage    Recent Glucose Results:   Lab Results   Component Value Date/Time     (H) 10/29/2019 04:56 AM    GLUCPOC 123 (H) 10/29/2019 06:11 AM    GLUCPOC 263 (H) 10/28/2019 09:00 PM    GLUCPOC 206 (H) 10/28/2019 04:21 PM     Patricia Machado MS, RN, CDE  Glycemic Control Team  [phone]  Pager [phone] (M-TH 8:00-4:30P)  *After Hours pager [phone]

## 2019-10-29 NOTE — ROUTINE PROCESS
Bedside and Verbal shift change report given to Chaparro Watt RN  (oncoming nurse) by Sujatha Mckinley RN  (offgoing nurse). Report given with SBAR, Kardex, Intake/Output and Recent Results

## 2019-10-29 NOTE — PROGRESS NOTES
Transition of care to SNF: [facility] message with Apolonio Leventhal at [facility] she is able to accommodate today. Bettie Crespo  will set up transportation    Communication to Patient/Family:  Met with patient and family, and they are agreeable to the transition plan. SNF/Rehab Transition:  Patient has been accepted to [facility] at [address] [facility],  [address] [place], [place] and meets criteria for admission. Patient will transported by [facility] and expected to leave at 1300    Communication to SNF/Rehab:  Bedside RN, Sumit Bolden RN, has been notified to update the transition plan to the facility and call report Report to [phone]. Discharge information has been updated on the AVS and sent via [facility] and/or CC link. Discharge instructions to be fax'd to facility at [phone] per requests     Please include all hard scripts for controlled substances, med rec and dc summary, and AVS in packet. Please medicate for pain prior to dc if possible and needed to help offset delay when patient first arrives to facility. Reviewed and confirmed with facility, [facility]  can manage the patient care needs for the following:     Cherelle Luciano with (X) only those applicable:  Medication:  []Medications are available at the facility  []IV Antibiotics    []Controlled Substance  hard copies available sent. []Weekly Labs    Equipment:  []CPAP/BiPAP  []Wound Vacuum  []Hernandez or Urinary Device  []PICC/Central Line  []Nebulizer  []Ventilator    Treatment:  []Isolation (for MRSA, VRE, etc.)  []Surgical Drain Management  []Tracheostomy Care  []Dressing Changes  []Dialysis with transportation  []PEG Care  []Oxygen  []Daily Weights for Heart Failure    Dietary:  []Any diet limitations  []Tube Feedings   []Total Parenteral Management (TPN)    Financial Resources:  []Medicaid Application Completed    []UAI Completed and copy given to pt/family    [x]A screening has previously been completed.     []Level II Completed    [] Private pay individual who will not become   financially eligible for Medicaid within 6 months from admission to a [facility] facility. [] Individual refused to have screening conducted. []Medicaid Application Completed    []The screening denied because it was determined individual did not need/did not qualify for nursing facility level of care. [] Out of state residents seeking direct admission to a [address] facility. [] Individuals who are inpatients of an out of state hospital, or in state or out of state veterans/ hospital and seek direct admission to a [address] facility  [] Individuals who are pateints or residents of a state owned/operated facility that is licensed by Department of [employer] and seek direct admission to [address] [place] [address]  [] A screening not required for enrollment in [address] [address] [address] services as set out in [address] [place] [address] 30-  [] [facility]) staff shall perform screenings of the [facility] clients. Advanced Care Plan:  []Surrogate Decision Maker of Care  []POA  []Communicated Code Status and copy sent. Other:         Care Management Interventions  PCP Verified by CM:  Yes  Palliative Care Criteria Met (RRAT>21 & CHF Dx)?: Yes  Palliative Consult Recommended?: Yes  Mode of Transport at Discharge: BLS  Transition of Care Consult (CM Consult): Long Term Care  Current Support Network: Lives with Spouse, Nursing Facility(wife also lives at [address])  Discharge Location  Discharge Placement: Skilled nursing facility

## 2019-10-29 NOTE — PROGRESS NOTES
[id number] Shift Summary: Pt rested well overnight with no complaints. No new clinical concerns noted.

## 2019-10-29 NOTE — PROGRESS NOTES
Problem: Falls - Risk of  Goal: *Absence of Falls  Description  Document Jeff Juan Manuel Fall Risk and appropriate interventions in the flowsheet. Outcome: Progressing Towards Goal  Note:   Fall Risk Interventions:  Mobility Interventions: Communicate number of staff needed for ambulation/transfer    Mentation Interventions: Adequate sleep, hydration, pain control    Medication Interventions: Bed/chair exit alarm    Elimination Interventions: Call light in reach              Problem: Patient Education: Go to Patient Education Activity  Goal: Patient/Family Education  Outcome: Progressing Towards Goal     Problem: Pressure Injury - Risk of  Goal: *Prevention of pressure injury  Description  Document Oseas Scale and appropriate interventions in the flowsheet.   Outcome: Progressing Towards Goal  Note:   Pressure Injury Interventions:  Sensory Interventions: Assess need for specialty bed    Moisture Interventions: Absorbent underpads    Activity Interventions: PT/OT evaluation, Increase time out of bed    Mobility Interventions: PT/OT evaluation, Pressure redistribution bed/mattress (bed type)    Nutrition Interventions: Document food/fluid/supplement intake    Friction and Shear Interventions: Apply protective barrier, creams and emollients                Problem: Patient Education: Go to Patient Education Activity  Goal: Patient/Family Education  Outcome: Progressing Towards Goal     Problem: Pain  Goal: *Control of Pain  Outcome: Progressing Towards Goal     Problem: Patient Education: Go to Patient Education Activity  Goal: Patient/Family Education  Outcome: Progressing Towards Goal     Problem: Heart Failure: Day 2  Goal: Activity/Safety  Outcome: Progressing Towards Goal  Goal: Consults, if ordered  Outcome: Progressing Towards Goal  Goal: Diagnostic Test/Procedures  Outcome: Progressing Towards Goal  Goal: Nutrition/Diet  Outcome: Progressing Towards Goal  Goal: Discharge Planning  Outcome: Progressing Towards Goal  Goal: Medications  Outcome: Progressing Towards Goal  Goal: Respiratory  Outcome: Progressing Towards Goal  Goal: Treatments/Interventions/Procedures  Outcome: Progressing Towards Goal  Goal: *Oxygen saturation within defined limits  Outcome: Progressing Towards Goal  Goal: *Hemodynamically stable  Outcome: Progressing Towards Goal  Goal: *Optimal pain control at patient's stated goal  Outcome: Progressing Towards Goal  Goal: *Anxiety reduced or absent  Outcome: Progressing Towards Goal  Goal: *Demonstrates progressive activity  Outcome: Progressing Towards Goal     Problem: Patient Education: Go to Patient Education Activity  Goal: Patient/Family Education  Outcome: Progressing Towards Goal     Problem: Patient Education: Go to Patient Education Activity  Goal: Patient/Family Education  Outcome: Progressing Towards Goal     Problem: Gas Exchange - Impaired  Goal: *Absence of hypoxia  Outcome: Progressing Towards Goal     Problem: Patient Education: Go to Patient Education Activity  Goal: Patient/Family Education  Outcome: Progressing Towards Goal     Problem: Dysphagia (Adult)  Goal: *Speech Goal: (INSERT TEXT)  Outcome: Progressing Towards Goal     Problem: Patient Education: Go to Patient Education Activity  Goal: Patient/Family Education  Outcome: Progressing Towards Goal     Problem: Aspiration - Risk of  Goal: *Absence of aspiration  Outcome: Progressing Towards Goal     Problem: Patient Education: Go to Patient Education Activity  Goal: Patient/Family Education  Outcome: Progressing Towards Goal     Problem: Nutrition Deficit  Goal: *Optimize nutritional status  Outcome: Progressing Towards Goal     Problem: Heart Failure: Discharge Outcomes  Goal: *Demonstrates ability to perform prescribed activity without shortness of breath or discomfort  Outcome: Progressing Towards Goal  Goal: *Left ventricular function assessment completed prior to or during stay, or planned for post-discharge  Outcome: Progressing Towards Goal  Goal: *ACEI prescribed if LVEF less than 40% and no contraindications or ARB prescribed  Outcome: Progressing Towards Goal  Goal: *Verbalizes understanding and describes prescribed diet  Outcome: Progressing Towards Goal  Goal: *Verbalizes understanding/describes prescribed medications  Outcome: Progressing Towards Goal  Goal: *Describes available resources and support systems  Description  (eg: Home Health, Palliative Care, Advanced Medical Directive)  Outcome: Progressing Towards Goal  Goal: *Describes smoking cessation resources  Outcome: Progressing Towards Goal  Goal: *Understands and describes signs and symptoms to report to providers(Stroke Metric)  Outcome: Progressing Towards Goal  Goal: *Describes/verbalizes understanding of follow-up/return appt  Description  (eg: to physicians, diabetes treatment coordinator, and other resources  Outcome: Progressing Towards Goal  Goal: *Describes importance of continuing daily weights and changes to report to physician  Outcome: Progressing Towards Goal

## 2019-10-29 NOTE — PROGRESS NOTES
1935 Verbal bedside received from [facility] off going nurse. Assumed patient care, bed in low position, call light within reach, white board updated. 1950 Assessment completed, patient AO x1, pleasant mood. 2000 paged Dr. Giuliana Soler and informed about patient not DC 'd today. Placed back on cardiac monitoring. 0345 Patient having frequent blocked PAC's. Patient resting quietly, asymptomatic. Paged Dr. Giuliana Soler order received for STAT mag, EKG and BMP.

## 2019-10-30 VITALS
DIASTOLIC BLOOD PRESSURE: 69 MMHG | OXYGEN SATURATION: 98 % | HEART RATE: 63 BPM | SYSTOLIC BLOOD PRESSURE: 153 MMHG | TEMPERATURE: 98 F | RESPIRATION RATE: 20 BRPM | HEIGHT: 68 IN | WEIGHT: 212.4 LBS | BODY MASS INDEX: 32.19 KG/M2

## 2019-10-30 LAB
ANION GAP SERPL CALC-SCNC: 4 MMOL/L (ref 3–18)
ATRIAL RATE: 68 BPM
BACTERIA SPEC CULT: NORMAL
BASOPHILS # BLD: 0 K/UL (ref 0–0.1)
BASOPHILS NFR BLD: 0 % (ref 0–2)
BUN SERPL-MCNC: 29 MG/DL (ref 7–18)
BUN/CREAT SERPL: 14 (ref 12–20)
CALCIUM SERPL-MCNC: 8.3 MG/DL (ref 8.5–10.1)
CALCULATED P AXIS, ECG09: -14 DEGREES
CALCULATED R AXIS, ECG10: -20 DEGREES
CALCULATED T AXIS, ECG11: 118 DEGREES
CHLORIDE SERPL-SCNC: 106 MMOL/L (ref 100–111)
CO2 SERPL-SCNC: 36 MMOL/L (ref 21–32)
CREAT SERPL-MCNC: 2.1 MG/DL (ref 0.6–1.3)
DIAGNOSIS, 93000: NORMAL
DIFFERENTIAL METHOD BLD: ABNORMAL
EOSINOPHIL # BLD: 0.3 K/UL (ref 0–0.4)
EOSINOPHIL NFR BLD: 5 % (ref 0–5)
ERYTHROCYTE [DISTWIDTH] IN BLOOD BY AUTOMATED COUNT: 14.9 % (ref 11.6–14.5)
GLUCOSE BLD STRIP.AUTO-MCNC: 129 MG/DL (ref 70–110)
GLUCOSE SERPL-MCNC: 124 MG/DL (ref 74–99)
HCT VFR BLD AUTO: 24 % (ref 36–48)
HCT VFR BLD AUTO: 25.6 % (ref 36–48)
HGB BLD-MCNC: 7.3 G/DL (ref 13–16)
HGB BLD-MCNC: 7.7 G/DL (ref 13–16)
LYMPHOCYTES # BLD: 1.9 K/UL (ref 0.9–3.6)
LYMPHOCYTES NFR BLD: 26 % (ref 21–52)
MAGNESIUM SERPL-MCNC: 2 MG/DL (ref 1.6–2.6)
MCH RBC QN AUTO: 27.5 PG (ref 24–34)
MCHC RBC AUTO-ENTMCNC: 30.4 G/DL (ref 31–37)
MCV RBC AUTO: 90.6 FL (ref 74–97)
MONOCYTES # BLD: 0.5 K/UL (ref 0.05–1.2)
MONOCYTES NFR BLD: 7 % (ref 3–10)
NEUTS SEG # BLD: 4.5 K/UL (ref 1.8–8)
NEUTS SEG NFR BLD: 62 % (ref 40–73)
P-R INTERVAL, ECG05: 310 MS
PLATELET # BLD AUTO: 213 K/UL (ref 135–420)
PMV BLD AUTO: 11.2 FL (ref 9.2–11.8)
POTASSIUM SERPL-SCNC: 3.9 MMOL/L (ref 3.5–5.5)
Q-T INTERVAL, ECG07: 438 MS
QRS DURATION, ECG06: 140 MS
QTC CALCULATION (BEZET), ECG08: 410 MS
RBC # BLD AUTO: 2.65 M/UL (ref 4.7–5.5)
SERVICE CMNT-IMP: NORMAL
SODIUM SERPL-SCNC: 146 MMOL/L (ref 136–145)
VENTRICULAR RATE, ECG03: 53 BPM
WBC # BLD AUTO: 7.2 K/UL (ref 4.6–13.2)

## 2019-10-30 PROCEDURE — 77010033678 HC OXYGEN DAILY

## 2019-10-30 PROCEDURE — 74011636637 HC RX REV CODE- 636/637: Performed by: HOSPITALIST

## 2019-10-30 PROCEDURE — 74011250637 HC RX REV CODE- 250/637: Performed by: INTERNAL MEDICINE

## 2019-10-30 PROCEDURE — 93005 ELECTROCARDIOGRAM TRACING: CPT

## 2019-10-30 PROCEDURE — 82962 GLUCOSE BLOOD TEST: CPT

## 2019-10-30 PROCEDURE — 80048 BASIC METABOLIC PNL TOTAL CA: CPT

## 2019-10-30 PROCEDURE — 83735 ASSAY OF MAGNESIUM: CPT

## 2019-10-30 PROCEDURE — 85025 COMPLETE CBC W/AUTO DIFF WBC: CPT

## 2019-10-30 PROCEDURE — 36415 COLL VENOUS BLD VENIPUNCTURE: CPT

## 2019-10-30 PROCEDURE — 74011250637 HC RX REV CODE- 250/637: Performed by: FAMILY MEDICINE

## 2019-10-30 PROCEDURE — 85018 HEMOGLOBIN: CPT

## 2019-10-30 PROCEDURE — 74011250636 HC RX REV CODE- 250/636: Performed by: HOSPITALIST

## 2019-10-30 PROCEDURE — 74011250637 HC RX REV CODE- 250/637: Performed by: HOSPITALIST

## 2019-10-30 RX ORDER — FUROSEMIDE 40 MG/1
40 TABLET ORAL DAILY
Status: DISCONTINUED | OUTPATIENT
Start: 2019-10-30 | End: 2019-10-30 | Stop reason: HOSPADM

## 2019-10-30 RX ADMIN — APIXABAN 2.5 MG: 2.5 TABLET, FILM COATED ORAL at 10:06

## 2019-10-30 RX ADMIN — NYSTATIN: 100000 POWDER TOPICAL at 10:07

## 2019-10-30 RX ADMIN — METOPROLOL TARTRATE 25 MG: 25 TABLET, FILM COATED ORAL at 10:06

## 2019-10-30 RX ADMIN — ATROPA BELLADONNA AND OPIUM 1 SUPPOSITORY: 16.2; 3 SUPPOSITORY RECTAL at 10:07

## 2019-10-30 RX ADMIN — ATORVASTATIN CALCIUM 40 MG: 20 TABLET, FILM COATED ORAL at 10:06

## 2019-10-30 RX ADMIN — FAMOTIDINE 20 MG: 20 TABLET ORAL at 10:06

## 2019-10-30 RX ADMIN — VALACYCLOVIR HYDROCHLORIDE 500 MG: 500 TABLET, FILM COATED ORAL at 10:06

## 2019-10-30 RX ADMIN — INSULIN LISPRO 4 UNITS: 100 INJECTION, SOLUTION INTRAVENOUS; SUBCUTANEOUS at 10:00

## 2019-10-30 RX ADMIN — HYDRALAZINE HYDROCHLORIDE 25 MG: 25 TABLET, FILM COATED ORAL at 10:06

## 2019-10-30 RX ADMIN — ISOSORBIDE DINITRATE 20 MG: 20 TABLET ORAL at 10:06

## 2019-10-30 NOTE — PROGRESS NOTES
Hospitalist Progress Note-critical care note     Patient: Ermelinda Corey MRN: [id number]  CSN: [id number]    YOB: 1935  Age: 81 y.o. Sex: male    DOA: 10/16/2019 LOS:  LOS: 14 days            Chief complaint:ckd 3-4,nstemi . uti     Assessment/Plan         Hospital Problems  Date Reviewed: 10/17/2019          Codes Class Noted POA    CKD (chronic kidney disease) stage 4, GFR 15-29 ml/min ([facility]) ICD-10-CM: N18.4  ICD-9-CM: 585.4  10/22/2019 Yes        NSTEMI (non-ST elevated myocardial infarction) [facility]) ICD-10-CM: I21.4  ICD-9-CM: 410.70  10/17/2019 Yes        * (Principal) Acute respiratory failure ([facility] [address] ICD-10-CM: J96.00  ICD-9-CM: 518.81  10/16/2019 Yes        Acute on chronic combined systolic and diastolic CHF (congestive heart failure) ([facility]) ICD-10-CM: I50.43  ICD-9-CM: 428.43, 428.0  10/16/2019 Yes        UTI (urinary tract infection) ICD-10-CM: N39.0  ICD-9-CM: 599.0  8/7/2019 Yes            Acute respiratory failure, resolved. No acute issue overnight     Acute diastolic heart failure, improved. Continue bbb     Non-ST-elevation myocardial infarction. Continue medical treatment   On bbb, nitrate and eliqus     Advanced dementia with bed-bound state. Mental status same at his base line     Acute renal failure with chronic kidney disease, stage III-IV, improved. Cr stable and around the baseline     Insulin-dependent diabetes mellitus with renal disease. No hypoglycemia      Aspiration pneumonia/uti , treated. Completed abx     Anemia due to chronic kidney disease. Stable     Hypernatremia, improved. Penile lesions. On valtra     Dysphagia, on modified diet    Some pac overnight, so far stable , no chest pain overnight, can be d.c back to snf     D/c today  Review of systems:    General: No fevers or chills. Cardiovascular: no  chest pain or pressure. No palpitations. Pulmonary: No shortness of breath. Gastrointestinal: No nausea, vomiting.      Vital signs/Intake and Output:  Visit Vitals  /69 (BP 1 Location: Left arm, BP Patient Position: At rest;Supine)   Pulse 63   Temp 98 °F (36.7 °C)   Resp 20   Ht 5' 8\" (1.727 m)   Wt 96.3 kg (212 lb 6.4 oz)   SpO2 98%   BMI 32.30 kg/m²     Current Shift:  No intake/output data recorded. Last three shifts:  10/28 1901 - 10/30 0700  In: 1040 [P.O.:1040]  Out: -     Physical Exam:  General: WD, WN. Alert ,some  cooperative, no acute distress    HEENT: NC, Atraumatic. PERRLA, anicteric sclerae. Lungs: CTA Bilaterally. No Wheezing/Rhonchi/Rales. Heart:  Regular  rhythm,  + murmur, No Rubs, No Gallops  Abdomen: Soft, Non distended, Non tender.  +Bowel sounds,   Extremities: No c/c/e-feet wrapped   Psych:   Not anxious or agitated. Neurologic:  No acute neurological deficit. Labs: Results:       Chemistry Recent Labs     10/30/19  0311 10/29/19  0456 10/28/19  0308   * 119* 157*   * 145 147*   K 3.9 3.5 3.6    107 109   CO2 36* 35* 36*   BUN 29* 31* 34*   CREA 2.10* 2.13* 2.50*   CA 8.3* 8.4* 7.9*   AGAP 4 3 2*   BUCR 14 15 14   AP  --   --  69   TP  --   --  6.5   ALB  --   --  2.1*   GLOB  --   --  4.4*   AGRAT  --   --  0.5*      CBC w/Diff Recent Labs     10/30/19  0311 10/29/19  2116 10/29/19  1420 10/29/19  0456  10/28/19  0653 10/28/19  0308   WBC 7.2  --   --  7.2  --  7.8 7.6   RBC 2.65*  --   --  2.76*  --  2.73* 2.49*   HGB 7.3* 7.9* 8.1* 7.6*   < > 7.5* 6.8*   HCT 24.0* 26.1* 26.2* 25.1*   < > 25.0* 23.0*     --   --  189  --  182 187   GRANS 62  --   --  67  --   --  65   LYMPH 26  --   --  21  --   --  24   EOS 5  --   --  4  --   --  4    < > = values in this interval not displayed. Cardiac Enzymes No results for input(s): CPK, CKND1, ISIDORO in the last 72 hours. No lab exists for component: CKRMB, TROIP   Coagulation No results for input(s): PTP, INR, APTT, INREXT, INREXT in the last 72 hours.     Lipid Panel No results found for: CHOL, [address], [address] [facility], [address] [facility], [address] [place] [address] Rd, [id number], HDL, HDLP, LDL, LDLC, DLDLP, [id number], VLDLC, VLDL, TGLX, TRIGL, TRIGP, TGLPOCT, CHHD, CHHDX   BNP No results for input(s): BNPP in the last 72 hours.    Liver Enzymes Recent Labs     10/28/19  0308   TP 6.5   ALB 2.1*   AP 69   SGOT 15      Thyroid Studies Lab Results   Component Value Date/Time    TSH 0.45 10/18/2019 04:10 AM        Procedures/imaging: see electronic medical records for all procedures/Xrays and details which were not copied into this note but were reviewed prior to creation of Brant Ervin MD

## 2019-10-30 NOTE — PROGRESS NOTES
0730: Bedside and Verbal shift change report given to Eufemia Stack RN (oncoming nurse) by NAFISA Rogers RN (offgoing nurse). Report included the following information Kardex, Intake/Output, Cardiac Rhythm NSR and Alarm Parameters . 0920: MD Sameer Hernandez at the bedside to assess and re educate pt on condition and discuss tx Eufemia Stack RN 
 
21 : MD Sameer Hernandez made aware pt has no IV access to receive lasix, MD Sameer Hernandez makes this nurse matter to be addressed, n/o noted Eufemia Stack RN 
 
1130: this nurse made aware pick time for pt 1330 A. Jasmyn Hopper RN Slow to comprehend dx and care continue to orienent pt on surroundings, educated on fall prevention,  call bell in reach 1300: transport arrives to transport pt to [place], report given, no questions for this nurse no s/s of further health abnormality at time of discharge Eufemia Stack RN 
 
7132: report called to [facility], no answer from staff after forward x2, message and phone number left with facility Eufemia Stack RN

## 2019-10-30 NOTE — PROGRESS NOTES
Bedside and Verbal shift change report given to [address]  (oncoming nurse) by Brianda Lambert (offgoing nurse). Report included the following information SBAR, Kardex and MAR.

## 2019-10-30 NOTE — PROGRESS NOTES
0730: Bedside and Verbal shift change report given to Génesis Stroud RN (oncoming nurse) by NAFISA Rogers RN (offgoing nurse). Report included the following information Kardex, Intake/Output, Cardiac Rhythm NSR and Alarm Parameters . 0920: MD Harjinder Brenner at the bedside to assess and re educate pt on condition and discuss tx Génesis Stroud RN 
 
21 : MD Harjinder Brenner made aware pt has no IV access to receive lasix, MD Harjinder Brenner makes this nurse matter to be addressed, n/o noted Génesis Stroud RN 
 
1130: this nurse made aware pick time for pt 1330 A. David Orourke RN Slow to comprehend dx and care continue to orienent pt on surroundings, educated on fall prevention,  call bell in reach 1300: transport arrives to transport pt to [place], report given, no questions for this nurse no s/s of further health abnormality at time of discharge Génesis Stroud RN 
 
9943: report called to [facility] Génesis Stroud RN

## 2019-10-30 NOTE — PROGRESS NOTES
Transition of care to SNF: [facility] from [facility] can accommodate at 1300 today    Communication to Patient/Family:  Met with patient and family, and they are agreeable to the transition plan. Telephone call wit son he is aware of d/c plan and agrees with d/c plan    SNF/Rehab Transition:  Patient has been accepted to [facility] at [address],  [address] [place], [place] and meets criteria for admission. Patient will transported by medical transport and expected to leave at 1300  Communication to SNF/Rehab:  Bedside RN Gadiel Vela, , has been notified to update the transition plan to the facility and call report Report to [phone]. Discharge information has been updated on the AVS and sent via [facility] and/or CC link. Discharge instructions to be fax'd to facility at [phone] per requests     Please include all hard scripts for controlled substances, med rec and dc summary, and AVS in packet. Please medicate for pain prior to dc if possible and needed to help offset delay when patient first arrives to facility. Reviewed and confirmed with facility, [facility]  can manage the patient care needs for the following:     Messi Castano with (X) only those applicable:  Medication:  []Medications are available at the facility  []IV Antibiotics    []Controlled Substance  hard copies available sent.   []Weekly Labs    Equipment:  []CPAP/BiPAP  []Wound Vacuum  []Hernandez or Urinary Device  []PICC/Central Line  []Nebulizer  []Ventilator    Treatment:  []Isolation (for MRSA, VRE, etc.)  []Surgical Drain Management  []Tracheostomy Care  []Dressing Changes  []Dialysis with transportation  []PEG Care  []Oxygen  []Daily Weights for Heart Failure    Dietary:  []Any diet limitations  []Tube Feedings   []Total Parenteral Management (TPN)    Financial Resources:  []Medicaid Application Completed    []UAI Completed and copy given to pt/family    [x]A screening has previously been completed. []Level II Completed    [] Private pay individual who will not become   financially eligible for Medicaid within 6 months from admission to a [facility] facility. [] Individual refused to have screening conducted. []Medicaid Application Completed    []The screening denied because it was determined individual did not need/did not qualify for nursing facility level of care. [] Out of state residents seeking direct admission to a [address] facility. [] Individuals who are inpatients of an out of state hospital, or in state or out of state veterans/ hospital and seek direct admission to a [address] facility  [] Individuals who are pateints or residents of a state owned/operated facility that is licensed by Department of [employer] and seek direct admission to [address] [place] [address]  [] A screening not required for enrollment in [address] [address] [address] services as set out in [address] [place] [address] 30-  [] [facility]) staff shall perform screenings of the [facility] clients. Advanced Care Plan:  []Surrogate Decision Maker of Care  []POA  []Communicated Code Status and copy sent. Other:         Care Management Interventions  PCP Verified by CM:  Yes  Palliative Care Criteria Met (RRAT>21 & CHF Dx)?: Yes  Palliative Consult Recommended?: Yes  Mode of Transport at Discharge: BLS  Transition of Care Consult (CM Consult): Long Term Care  Current Support Network: Lives with Spouse, Nursing Facility(wife also lives at [address])  Discharge Location  Discharge Placement: Skilled nursing facility

## 2019-11-05 LAB
SPECIMEN SOURCE: NORMAL
VIRUS SPEC CULT: NORMAL

## 2019-11-06 ENCOUNTER — PATIENT OUTREACH (OUTPATIENT)
Dept: CASE MANAGEMENT | Age: 84
End: 2019-11-06

## 2019-11-06 NOTE — PROGRESS NOTES
Community Care Team Documentation for Patient in [facility]     [facility], on 10/29/19. DISCHARGE DIAGNOSES:     1. Acute respiratory failure, resolved. 2.  Acute diastolic heart failure, improved. 3.  Advanced dementia with bed-bound state. 4.  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 5.  Insulin-dependent diabetes mellitus with renal disease. 6.  Aspiration pneumonia, treated. 7.  Anemia due to chronic kidney disease. 8.  Non-ST-elevation myocardial infarction. 9.  Hypernatremia, improved. 10.  Penile lesions. 11.  Dysphagia, on modified diet. SNF Attending Provider:      Anticipated discharge date from SNF:  N/a, pt is LTC Resident at facility    PCP : Joanie Jose MD    Nurse Navigator:     [facility] rounds completed, updates provided by facility. Brief Summary of Care:    Patient was admitted with Acute Resp Failure to [facility]. He is LTC Resident at facility. Will follow for COLLINS x 30 days. RRAT:  High Risk            41       Total Score        3 Has Seen PCP in Last 6 Months (Yes=3, No=0)    2 . Living with Significant Other. Assisted Living. LTAC. SNF. or   Rehab    4 IP Visits Last 12 Months (1-3=4, 4=9, >4=11)    32 Charlson Comorbidity Score (Age + Comorbid Conditions)        Criteria that do not apply:    Patient Length of Stay (>5 days = 3)    Pt.  Coverage (Medicare=5 , Medicaid, or Self-Pay=4)        Active Ambulatory Problems     Diagnosis Date Noted    Acute exacerbation of CHF (congestive heart failure) ([address] 08/07/2019    Dementia ([address] 08/07/2019    History of CVA (cerebrovascular accident) 08/07/2019    Insulin dependent diabetes mellitus with complications ([address] 34/94/0709    UTI (urinary tract infection) 08/07/2019    Acute respiratory failure ([address] 10/16/2019    Acute on chronic combined systolic and diastolic CHF (congestive heart failure) ([facility] 10/16/2019    NSTEMI (non-ST elevated myocardial infarction) ([address] 10/17/2019    CKD (chronic kidney disease) stage 4, GFR 15-29 ml/min ([address] 10/22/2019     Resolved Ambulatory Problems     Diagnosis Date Noted    Renal insufficiency 08/07/2019    CKD (chronic kidney disease) stage 3, GFR 30-59 ml/min ([address] 08/07/2019     Past Medical History:   Diagnosis Date    CAD (coronary artery disease)     Chronic kidney disease     Diabetes ([facility]     Hypertension     Stroke ([facility]

## 2019-11-14 ENCOUNTER — PATIENT OUTREACH (OUTPATIENT)
Dept: CASE MANAGEMENT | Age: 84
End: 2019-11-14

## 2019-11-14 NOTE — PROGRESS NOTES
Community Care Team Documentation for Patient in [facility]     Patient discharged from [id number]  [address]  to [facility], on 10/29/19. DISCHARGE DIAGNOSES:     1. Acute respiratory failure, resolved. 2.  Acute diastolic heart failure, improved. 3.  Advanced dementia with bed-bound state. 4.  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 5.  Insulin-dependent diabetes mellitus with renal disease. 6.  Aspiration pneumonia, treated. 7.  Anemia due to chronic kidney disease. 8.  Non-ST-elevation myocardial infarction. 9.  Hypernatremia, improved. 10.  Penile lesions. 11.  Dysphagia, on modified diet. SNF Attending Provider:      Anticipated discharge date from SNF:  N/a, pt is LTC Resident at facility    PCP : Joanie Jose MD    Nurse Navigator:     [facility] rounds completed, updates provided by facility. Brief Summary of Care:    Patient was admitted with Acute Resp Failure to [facility]. He is LTC Resident at facility. Will follow for COLLINS x 30 days. RRAT:  High Risk            41       Total Score        3 Has Seen PCP in Last 6 Months (Yes=3, No=0)    2 . Living with Significant Other. Assisted Living. LTAC. SNF. or   Rehab    4 IP Visits Last 12 Months (1-3=4, 4=9, >4=11)    32 Charlson Comorbidity Score (Age + Comorbid Conditions)        Criteria that do not apply:    Patient Length of Stay (>5 days = 3)    Pt.  Coverage (Medicare=5 , Medicaid, or Self-Pay=4)        Active Ambulatory Problems     Diagnosis Date Noted    Acute exacerbation of CHF (congestive heart failure) ([address] 08/07/2019    Dementia ([address] 08/07/2019    History of CVA (cerebrovascular accident) 08/07/2019    Insulin dependent diabetes mellitus with complications ([address] 60/69/5278    UTI (urinary tract infection) 08/07/2019    Acute respiratory failure ([address] 10/16/2019    Acute on chronic combined systolic and diastolic CHF (congestive heart failure) ([facility] 10/16/2019    NSTEMI (non-ST elevated myocardial infarction) ([address] 10/17/2019    CKD (chronic kidney disease) stage 4, GFR 15-29 ml/min ([address] 10/22/2019     Resolved Ambulatory Problems     Diagnosis Date Noted    Renal insufficiency 08/07/2019    CKD (chronic kidney disease) stage 3, GFR 30-59 ml/min ([address] 08/07/2019     Past Medical History:   Diagnosis Date    CAD (coronary artery disease)     Chronic kidney disease     Diabetes ([facility]     Hypertension     Stroke ([facility]

## 2019-11-19 ENCOUNTER — PATIENT OUTREACH (OUTPATIENT)
Dept: CASE MANAGEMENT | Age: 84
End: 2019-11-19

## 2019-11-19 NOTE — PROGRESS NOTES
Community Care Team Documentation for Patient in [facility]     Patient discharged from [id number]  [address]  to [facility], on 10/29/19. DISCHARGE DIAGNOSES:     1. Acute respiratory failure, resolved. 2.  Acute diastolic heart failure, improved. 3.  Advanced dementia with bed-bound state. 4.  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 5.  Insulin-dependent diabetes mellitus with renal disease. 6.  Aspiration pneumonia, treated. 7.  Anemia due to chronic kidney disease. 8.  Non-ST-elevation myocardial infarction. 9.  Hypernatremia, improved. 10.  Penile lesions. 11.  Dysphagia, on modified diet. SNF Attending Provider:      Anticipated discharge date from SNF:  N/a, pt is LTC Resident at facility    PCP : Nataly Vargas MD    Nurse Navigator:     [facility] rounds completed, updates provided by facility. Brief Summary of Care:    Patient was admitted with Acute Resp Failure to [facility]. He is LTC Resident at facility. Will follow for COLLINS x 30 days. RRAT:  High Risk            41       Total Score        3 Has Seen PCP in Last 6 Months (Yes=3, No=0)    2 . Living with Significant Other. Assisted Living. LTAC. SNF. or   Rehab    4 IP Visits Last 12 Months (1-3=4, 4=9, >4=11)    32 Charlson Comorbidity Score (Age + Comorbid Conditions)        Criteria that do not apply:    Patient Length of Stay (>5 days = 3)    Pt.  Coverage (Medicare=5 , Medicaid, or Self-Pay=4)        Active Ambulatory Problems     Diagnosis Date Noted    Acute exacerbation of CHF (congestive heart failure) ([address] 08/07/2019    Dementia ([address] 08/07/2019    History of CVA (cerebrovascular accident) 08/07/2019    Insulin dependent diabetes mellitus with complications ([address] 70/16/0827    UTI (urinary tract infection) 08/07/2019    Acute respiratory failure ([address] 10/16/2019    Acute on chronic combined systolic and diastolic CHF (congestive heart failure) ([facility] 10/16/2019    NSTEMI (non-ST elevated myocardial infarction) ([address] 10/17/2019    CKD (chronic kidney disease) stage 4, GFR 15-29 ml/min ([address] 10/22/2019     Resolved Ambulatory Problems     Diagnosis Date Noted    Renal insufficiency 08/07/2019    CKD (chronic kidney disease) stage 3, GFR 30-59 ml/min ([address] 08/07/2019     Past Medical History:   Diagnosis Date    CAD (coronary artery disease)     Chronic kidney disease     Diabetes ([facility]     Hypertension     Stroke ([facility]

## 2019-11-26 ENCOUNTER — PATIENT OUTREACH (OUTPATIENT)
Dept: CASE MANAGEMENT | Age: 84
End: 2019-11-26

## 2019-11-26 NOTE — PROGRESS NOTES
Community Care Team Documentation for Patient in [facility]     Patient discharged from [address]  to [facility], on 10/29/19. DISCHARGE DIAGNOSES:     1. Acute respiratory failure, resolved. 2.  Acute diastolic heart failure, improved. 3.  Advanced dementia with bed-bound state. 4.  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 5.  Insulin-dependent diabetes mellitus with renal disease. 6.  Aspiration pneumonia, treated. 7.  Anemia due to chronic kidney disease. 8.  Non-ST-elevation myocardial infarction. 9.  Hypernatremia, improved. 10.  Penile lesions. 11.  Dysphagia, on modified diet. SNF Attending Provider:      Anticipated discharge date from SNF:  N/a, pt is LTC Resident at facility    PCP : Sharon Salvador MD    Nurse Navigator:     [facility] rounds completed, updates provided by facility. Brief Summary of Care:    Patient was admitted with Acute Resp Failure to [facility]. He is LTC Resident at facility. Have followed for COLLINS x 30 days. Patient stable  Signing off at this time. RRAT:  High Risk            41       Total Score        3 Has Seen PCP in Last 6 Months (Yes=3, No=0)    2 . Living with Significant Other. Assisted Living. LTAC. SNF. or   Rehab    4 IP Visits Last 12 Months (1-3=4, 4=9, >4=11)    32 Charlson Comorbidity Score (Age + Comorbid Conditions)        Criteria that do not apply:    Patient Length of Stay (>5 days = 3)    Pt.  Coverage (Medicare=5 , Medicaid, or Self-Pay=4)        Active Ambulatory Problems     Diagnosis Date Noted    Acute exacerbation of CHF (congestive heart failure) ([address] 08/07/2019    Dementia ([address] 08/07/2019    History of CVA (cerebrovascular accident) 08/07/2019    Insulin dependent diabetes mellitus with complications ([address] 13/49/4059    UTI (urinary tract infection) 08/07/2019    Acute respiratory failure ([address] 10/16/2019    Acute on chronic combined systolic and diastolic CHF (congestive heart failure) ([facility] 10/16/2019    NSTEMI (non-ST elevated myocardial infarction) ([facility] 10/17/2019    CKD (chronic kidney disease) stage 4, GFR 15-29 ml/min ([facility] 10/22/2019     Resolved Ambulatory Problems     Diagnosis Date Noted    Renal insufficiency 08/07/2019    CKD (chronic kidney disease) stage 3, GFR 30-59 ml/min ([facility] 08/07/2019     Past Medical History:   Diagnosis Date    CAD (coronary artery disease)     Chronic kidney disease     Diabetes ([facility]     Hypertension     [facility]

## 2019-12-02 ENCOUNTER — HOSPITAL ENCOUNTER (OUTPATIENT)
Dept: LAB | Age: 84
Discharge: HOME | End: 2019-12-02

## 2019-12-02 LAB
ALBUMIN SERPL-MCNC: 2.5 G/DL (ref 3.4–5)
ALBUMIN/GLOB SERPL: 0.6 {RATIO} (ref 0.8–1.7)
ALP SERPL-CCNC: 105 U/L (ref 45–117)
ALT SERPL-CCNC: 10 U/L (ref 16–61)
ANION GAP SERPL CALC-SCNC: 6 MMOL/L (ref 3–18)
APPEARANCE UR: CLEAR
AST SERPL-CCNC: 14 U/L (ref 10–38)
BACTERIA URNS QL MICRO: ABNORMAL /HPF
BASOPHILS # BLD: 0 K/UL (ref 0–0.1)
BASOPHILS NFR BLD: 0 % (ref 0–2)
BILIRUB SERPL-MCNC: 0.6 MG/DL (ref 0.2–1)
BILIRUB UR QL: NEGATIVE
BUN SERPL-MCNC: 36 MG/DL (ref 7–18)
BUN/CREAT SERPL: 19 (ref 12–20)
CALCIUM SERPL-MCNC: 8.8 MG/DL (ref 8.5–10.1)
CHLORIDE SERPL-SCNC: 106 MMOL/L (ref 100–111)
CK SERPL-CCNC: 67 U/L (ref 39–308)
CO2 SERPL-SCNC: 30 MMOL/L (ref 21–32)
COLOR UR: YELLOW
CREAT SERPL-MCNC: 1.9 MG/DL (ref 0.6–1.3)
DIFFERENTIAL METHOD BLD: ABNORMAL
EOSINOPHIL # BLD: 0.1 K/UL (ref 0–0.4)
EOSINOPHIL NFR BLD: 2 % (ref 0–5)
EPITH CASTS URNS QL MICRO: ABNORMAL /LPF (ref 0–5)
ERYTHROCYTE [DISTWIDTH] IN BLOOD BY AUTOMATED COUNT: 15.6 % (ref 11.6–14.5)
GLOBULIN SER CALC-MCNC: 4.5 G/DL (ref 2–4)
GLUCOSE SERPL-MCNC: 104 MG/DL (ref 74–99)
GLUCOSE UR STRIP.AUTO-MCNC: NEGATIVE MG/DL
HCT VFR BLD AUTO: 27 % (ref 36–48)
HGB BLD-MCNC: 8.2 G/DL (ref 13–16)
HGB UR QL STRIP: ABNORMAL
KETONES UR QL STRIP.AUTO: 15 MG/DL
LEUKOCYTE ESTERASE UR QL STRIP.AUTO: NEGATIVE
LYMPHOCYTES # BLD: 1.1 K/UL (ref 0.9–3.6)
LYMPHOCYTES NFR BLD: 17 % (ref 21–52)
MCH RBC QN AUTO: 27.8 PG (ref 24–34)
MCHC RBC AUTO-ENTMCNC: 30.4 G/DL (ref 31–37)
MCV RBC AUTO: 91.5 FL (ref 74–97)
MONOCYTES # BLD: 0.5 K/UL (ref 0.05–1.2)
MONOCYTES NFR BLD: 8 % (ref 3–10)
NEUTS SEG # BLD: 4.7 K/UL (ref 1.8–8)
NEUTS SEG NFR BLD: 73 % (ref 40–73)
NITRITE UR QL STRIP.AUTO: NEGATIVE
PH UR STRIP: 5 [PH] (ref 5–8)
PLATELET # BLD AUTO: 184 K/UL (ref 135–420)
PMV BLD AUTO: 11.5 FL (ref 9.2–11.8)
POTASSIUM SERPL-SCNC: 4.1 MMOL/L (ref 3.5–5.5)
PROT SERPL-MCNC: 7 G/DL (ref 6.4–8.2)
PROT UR STRIP-MCNC: 300 MG/DL
RBC # BLD AUTO: 2.95 M/UL (ref 4.7–5.5)
RBC #/AREA URNS HPF: ABNORMAL /HPF (ref 0–5)
SODIUM SERPL-SCNC: 142 MMOL/L (ref 136–145)
SP GR UR REFRACTOMETRY: 1.02 (ref 1–1.03)
UROBILINOGEN UR QL STRIP.AUTO: 1 EU/DL (ref 0.2–1)
WBC # BLD AUTO: 6.4 K/UL (ref 4.6–13.2)
WBC URNS QL MICRO: ABNORMAL /HPF (ref 0–5)

## 2019-12-02 PROCEDURE — 80053 COMPREHEN METABOLIC PANEL: CPT

## 2019-12-02 PROCEDURE — 81001 URINALYSIS AUTO W/SCOPE: CPT

## 2019-12-02 PROCEDURE — 82550 ASSAY OF CK (CPK): CPT

## 2019-12-02 PROCEDURE — 85025 COMPLETE CBC W/AUTO DIFF WBC: CPT

## 2019-12-03 LAB
FAX TO INFO,FAXT: NORMAL
FAX TO NUMBER,FAXN: NORMAL

## 2020-01-07 ENCOUNTER — HOSPITAL ENCOUNTER (OUTPATIENT)
Dept: LAB | Age: 85
Discharge: HOME | End: 2020-01-07

## 2020-01-07 PROCEDURE — 85025 COMPLETE CBC W/AUTO DIFF WBC: CPT

## 2020-01-07 PROCEDURE — 81001 URINALYSIS AUTO W/SCOPE: CPT

## 2020-01-07 PROCEDURE — 80053 COMPREHEN METABOLIC PANEL: CPT

## 2020-01-08 LAB
ALBUMIN SERPL-MCNC: 2.2 G/DL (ref 3.4–5)
ALBUMIN/GLOB SERPL: 0.5 {RATIO} (ref 0.8–1.7)
ALP SERPL-CCNC: 88 U/L (ref 45–117)
ALT SERPL-CCNC: 13 U/L (ref 16–61)
AMORPH CRY URNS QL MICRO: ABNORMAL
ANION GAP SERPL CALC-SCNC: 3 MMOL/L (ref 3–18)
APPEARANCE UR: ABNORMAL
AST SERPL-CCNC: 15 U/L (ref 10–38)
BACTERIA URNS QL MICRO: ABNORMAL /HPF
BASOPHILS # BLD: 0 K/UL (ref 0–0.1)
BASOPHILS NFR BLD: 0 % (ref 0–2)
BILIRUB SERPL-MCNC: 0.6 MG/DL (ref 0.2–1)
BILIRUB UR QL: NEGATIVE
BUN SERPL-MCNC: 40 MG/DL (ref 7–18)
BUN/CREAT SERPL: 17 (ref 12–20)
CALCIUM SERPL-MCNC: 8.3 MG/DL (ref 8.5–10.1)
CHLORIDE SERPL-SCNC: 110 MMOL/L (ref 100–111)
CO2 SERPL-SCNC: 36 MMOL/L (ref 21–32)
COLOR UR: YELLOW
CREAT SERPL-MCNC: 2.35 MG/DL (ref 0.6–1.3)
EOSINOPHIL # BLD: 0.1 K/UL (ref 0–0.4)
EOSINOPHIL NFR BLD: 1 % (ref 0–5)
EPITH CASTS URNS QL MICRO: ABNORMAL /LPF (ref 0–5)
ERYTHROCYTE [DISTWIDTH] IN BLOOD BY AUTOMATED COUNT: 15.1 % (ref 11.6–14.5)
FAX TO INFO,FAXT: NORMAL
FAX TO NUMBER,FAXN: NORMAL
GLOBULIN SER CALC-MCNC: 4.4 G/DL (ref 2–4)
GLUCOSE SERPL-MCNC: 350 MG/DL (ref 74–99)
GLUCOSE UR STRIP.AUTO-MCNC: NEGATIVE MG/DL
HCT VFR BLD AUTO: 28.6 % (ref 36–48)
HGB BLD-MCNC: 8.4 G/DL (ref 13–16)
HGB UR QL STRIP: NEGATIVE
KETONES UR QL STRIP.AUTO: NEGATIVE MG/DL
LEUKOCYTE ESTERASE UR QL STRIP.AUTO: NEGATIVE
LYMPHOCYTES # BLD: 1.4 K/UL (ref 0.9–3.6)
LYMPHOCYTES NFR BLD: 14 % (ref 21–52)
MCH RBC QN AUTO: 27.8 PG (ref 24–34)
MCHC RBC AUTO-ENTMCNC: 29.4 G/DL (ref 31–37)
MCV RBC AUTO: 94.7 FL (ref 74–97)
MONOCYTES # BLD: 0.9 K/UL (ref 0.05–1.2)
MONOCYTES NFR BLD: 9 % (ref 3–10)
NEUTS SEG # BLD: 7.4 K/UL (ref 1.8–8)
NEUTS SEG NFR BLD: 76 % (ref 40–73)
NITRITE UR QL STRIP.AUTO: NEGATIVE
PH UR STRIP: 5 [PH] (ref 5–8)
PLATELET # BLD AUTO: 178 K/UL (ref 135–420)
PMV BLD AUTO: 12 FL (ref 9.2–11.8)
POTASSIUM SERPL-SCNC: 3.2 MMOL/L (ref 3.5–5.5)
PROT SERPL-MCNC: 6.6 G/DL (ref 6.4–8.2)
PROT UR STRIP-MCNC: >1000 MG/DL
RBC # BLD AUTO: 3.02 M/UL (ref 4.7–5.5)
RBC #/AREA URNS HPF: ABNORMAL /HPF (ref 0–5)
SODIUM SERPL-SCNC: 149 MMOL/L (ref 136–145)
SP GR UR REFRACTOMETRY: 1.02 (ref 1–1.03)
UROBILINOGEN UR QL STRIP.AUTO: 1 EU/DL (ref 0.2–1)
WBC # BLD AUTO: 9.8 K/UL (ref 4.6–13.2)
WBC URNS QL MICRO: ABNORMAL /HPF (ref 0–5)

## 2020-06-01 LAB
STRESS BASELINE HR: 105 BPM
STRESS ESTIMATED WORKLOAD: 1 METS
STRESS EXERCISE DUR MIN: NORMAL
STRESS PEAK DIAS BP: 75 MMHG
STRESS PEAK SYS BP: 160 MMHG
STRESS PERCENT HR ACHIEVED: 92 %
STRESS POST PEAK HR: 127 BPM
STRESS RATE PRESSURE PRODUCT: NORMAL BPM*MMHG
STRESS TARGET HR: 138 BPM

## 2020-07-03 NOTE — PROGRESS NOTES
Chart reviewed as CM on call. Pt admitted to tele by hospitalist. Per hospitalist pt is not medically stable for dc at this time. Pt is from [facility] for LTC. This CM spoke with [facility] and pt can return once medically ready. CM will cont to follow for transition of care needs and will be available via hospital  on weekends. no verbalization of thoughts of harm

## 2021-08-31 NOTE — H&P
August 31, 2021     Gab Martinez, [address]  [address] [address]    Patient: Salima Vo   YOB: 1945   Date of Visit: 8/31/2021       Dear Dr Nishi Beauchamp: Thank you for referring Gustabo Gomez to me for evaluation  Below are my notes for this consultation  If you have questions, please do not hesitate to call me  I look forward to following your patient along with you  Sincerely,        Dagmar Valencia DO        CC: No Recipients  Dagmar Valencia DO  8/31/2021  8:22 PM  Sign when Signing Visit  Progress Note - Sleep Medicine  Salima Vo 77 y o  female MRN: [id number]       Impression & Plan:   77 y/o F with PMHx of breast cancer s/p mastectomy, former smoker, Severe JABARI and frequent UTIs who comes in to follow up      1   Severe JABARI (AHI - 54 6)  with hypoxia - on autoBiPAP   She still has difficulty with mask leak   Residual AHI - 10 7 (DME - ladan)      -  She paused therapy while the recall was announced  She came to the understanding of why the PAP therapy was helpful  She will continue to use it as consistently as she can  -  Continue autoBiPAP  Will adjust pressure with mild increase to EPAP of 13 to see if a few more events can be eliminated         -  Follow compliance in 6 months      - She is aware of the risk of leaving sleep apnea untreated including hypertension, heart failure, arrhythmia, MI and stroke      2   Restless legs/Periodic limb movement (PLM index - 27,  PLM) -  This may be secondary to chronic back and leg pain, JABARI       Ferritin is still low but she has not been taking iron supplements  I again recommended that she take them  She will try again       -  She is unsure if neurontin is helping  She will pause it briefly to see if it does help          3  Sleep onset insomnia - she has very infrequently using Xanax  She is currently using melatonin as well    She finds that this usually is sufficient to help her History & Physical    Patient: Saturnino Kirkpatrick MRN: [id number]  CSN: [id number]    YOB: 1935  Age: 81 y.o. Sex: male      DOA: 8/7/2019  Primary Care Provider:  Irish Ulloa MD      Assessment/Plan     Patient Active Problem List   Diagnosis Code    [facility] ([facility] J96.90    Acute exacerbation of CHF (congestive heart failure) (HCC) I50.9    Renal insufficiency N28.9       82 y/o male with dementia who is at [facility] AT [facility], CKD, HTN, and CVA admitted for new onset CHF with exacerbation. HCAP is also a possibility but not likely given no leukocytosis or fever. Neuro-L sided weakness due to prior CVA that is baseline. He is only A&Ox1 due to dementia. Pulm-pulmonary edema likely due to new onset CHF. Will treat with IV lasix. Will cover for HCAP until blood cultures negative as I do not believe he has infection at this time. Will wean from BiPAP to NC.  CV-likely new onset CHF, will get echo and request cardiology consultation. Will also trend troponins. GI-NPO for now. Renal-CKD, likely acute on chronic. Will monitor closely given diuresis. Heme-mild anemia  ID-do not suspect HCAP given absence of leukocytosis or fever. Will keep broad spectrum abx until blood cx neg.  Possible UTI given positive nitrites and small leuk esterase and can cover with narrowed abx based on culture  Endo-SSI, A1C pending  Derm-no issues  F/E/N-no IVF, replete prn/NPO until off BiPAP    Prophy-SCDs, protonix, eliquis    Full code    Estimated length of stay : 2 nights    Giuliana Garcia MD  Nocturnist    Dr. Kris Jose aware of admission    Critical Care Time:   4744-7219 AM  I have spent 45 minutes of critical care time involved in lab review, consultations with specialist, family decision-making, and documentation. Jo Robin this entire length of time I was immediately available to the patient.     Critical Care:  The reason for providing this level of medical care for this critically ill patient was due a critical illness that impaired one or more vital organ systems such that there was a high probability of imminent or life threatening deterioration in the patients condition. This care involved high complexity decision making to assess, manipulate, and support vital system functions, to treat this degree vital organ system failure and to prevent further life threatening deterioration of the patients condition.    --------------------------------------------------------------------------------------------------------------------------    CC: dyspnea       HPI:     Tianna Elizabeth is a 81 y.o. male who was brought over from [facility] AT [facility] due to respiratory distress that developed early this morning. Per EMS he was satting in the 76s and was started on nonrebreather. He was placed on BiPAP in ER. Pt is a poor historian due to dementia (only A&Ox1) and no family is at the bedside. Past Medical History:   Diagnosis Date    CAD (coronary artery disease)     Chronic kidney disease     Dementia     Diabetes ([facility]     Hypertension     Stroke [facility])        History reviewed. No pertinent surgical history. History reviewed. No pertinent family history. Social History     Socioeconomic History    Marital status:      Spouse name: Not on file    Number of children: Not on file    Years of education: Not on file    Highest education level: Not on file   Tobacco Use    Smoking status: Unknown If Ever Smoked   Substance and Sexual Activity    Alcohol use: Not Currently       Prior to Admission medications    Medication Sig Start Date End Date Taking? Authorizing Provider   albuterol (PROVENTIL VENTOLIN) 2.5 mg /3 mL (0.083 %) nebu by Nebulization route. Yes Other, MD Michael   alfuzosin SR (UROXATRAL) 10 mg SR tablet Take  by mouth daily. Yes Other, MD Michael   amLODIPine (NORVASC) 5 mg tablet Take 5 mg by mouth daily. Yes Other, MD Michael   atorvastatin (LIPITOR) 40 mg tablet Take 40 mg by mouth daily.    Yes sleep       4   Memory impairment - she definitely seems more forgetful and likely is developing some dementia  Continue to follow with neurology      5   Abnormal CT - linear band like opacity is stable since 2018   Likely subpleural scarring   No additional testing needed    ______________________________________________________________________    HPI:    Salvador Mccray presents today for follow-up for her JABARI, RLS and insomnia  She is not the best historian as she does repeat herself and her memory is a bit impaired  She states that she did stop the PAP therapy during the recall but noted significant worsening so she resumed therapy  She continues to have mask leak and as a result has some residual AHI  She denies mask intolerance or morning headache  Review of Systems:  Review of Systems   HENT: Negative  Eyes: Negative  Respiratory: Negative  Gastrointestinal: Negative  Genitourinary: Negative  Musculoskeletal: Negative  Skin: Negative  Allergic/Immunologic: Negative  Hematological: Negative  Psychiatric/Behavioral: Negative  Social history updates:  Social History     Tobacco Use   Smoking Status Former Smoker    Packs/day: 0 25    Years: 0 00    Pack years: 0 00    Start date:     Quit date: 1985    Years since quittin 9   Smokeless Tobacco Never Used   Tobacco Comment    quit over 40 years ago     Social History     Socioeconomic History    Marital status:       Spouse name: Not on file    Number of children: Not on file    Years of education: Not on file    Highest education level: Not on file   Occupational History    Not on file   Tobacco Use    Smoking status: Former Smoker     Packs/day: 0 25     Years: 0 00     Pack years: 0 00     Start date:      Quit date: 1985     Years since quittin 9    Smokeless tobacco: Never Used    Tobacco comment: quit over 40 years ago   Vaping Use    Vaping Use: Never Other, MD Michael   apixaban (ELIQUIS) 2.5 mg tablet Take 2.5 mg by mouth two (2) times a day. Yes Michael Gama MD   hydrALAZINE (APRESOLINE) 50 mg tablet Take 25 mg by mouth three (3) times daily. Yes Lanette, MD Michael   pregabalin (LYRICA) 75 mg capsule Take 75 mg by mouth. Yes Lanette, MD Michael   insulin aspart U-100 (NOVOLOG) 100 unit/mL injection by SubCUTAneous route Before breakfast, lunch, dinner and at bedtime. Yes Lanette, MD Michael   raNITIdine (ZANTAC) 150 mg tablet Take 150 mg by mouth two (2) times a day. Yes Michael Gama MD   montelukast (SINGULAIR) 10 mg tablet Take 10 mg by mouth daily. Yes Lanette, MD Michael   insulin glargine U-300 conc (TOUJEO SOLOSTAR U-300 INSULIN) 300 unit/mL (1.5 mL) inpn pen by SubCUTAneous route daily. Yes Lanette, MD Michael   acetaminophen (TYLENOL) 650 mg TbER Take 650 mg by mouth every eight (8) hours. Other, MD Michael       Allergies   Allergen Reactions    Green Juice Unknown (comments)    Pork Derived (Porcine) Unknown (comments)    Tomato Unknown (comments)     Pt unable to answer, dementia         Review of Systems  Unable to obtain due to dementia      Physical Exam:     Physical Exam:  Visit Vitals  /68   Pulse 87   Temp 96.1 °F (35.6 °C)   Resp 20   Ht 5' 8\" (1.727 m) Comment: Per Dr. Babak Loyd   Wt 89.4 kg (197 lb)   SpO2 96%   BMI 29.95 kg/m²    O2 Flow Rate (L/min): 2 l/min O2 Device: Nasal cannula    Temp (24hrs), Av.1 °F (35.6 °C), Min:96.1 °F (35.6 °C), Max:96.1 °F (35.6 °C)    No intake/output data recorded. No intake/output data recorded. General:  Awake, cooperative, BiPAP in place. Head:  Normocephalic, without obvious abnormality, atraumatic. Eyes:  Conjunctivae/corneas clear, sclera anicteric. Neck: Supple, symmetrical, trachea midline, no adenopathy. Lungs:   Diminished in b/l lung bases, no wheezes. Heart:  Regular rate and rhythm, S1, S2 normal, no murmur, click, rub or gallop.      Abdomen: Soft, used   Substance and Sexual Activity    Alcohol use: Yes    Drug use: No    Sexual activity: Never   Other Topics Concern    Not on file   Social History Narrative    Wears seatbelt    Regular dental care    Denies regular exercise     Lives alone -     Has an advanced directive     Social Determinants of Health     Financial Resource Strain: Low Risk     Difficulty of Paying Living Expenses: Not hard at all   Food Insecurity: No Food Insecurity    Worried About Running Out of Food in the Last Year: Never true    Emily of Food in the Last Year: Never true   Transportation Needs: No Transportation Needs    Lack of Transportation (Medical): No    Lack of Transportation (Non-Medical): No   Physical Activity: Inactive    Days of Exercise per Week: 0 days    Minutes of Exercise per Session: 0 min   Stress: Stress Concern Present    Feeling of Stress : To some extent   Social Connections: Unknown    Frequency of Communication with Friends and Family:  Three times a week    Frequency of Social Gatherings with Friends and Family: Twice a week    Attends Alevism Services: Not on file   [employer] or Organizations: Not on file    Attends Club or Organization Meetings: Not on file    Marital Status: Not on file   Intimate Partner Violence:     Fear of Current or Ex-Partner:     Emotionally Abused:     Physically Abused:     Sexually Abused:        PhysicalExamination:  Vitals:   /96 (BP Location: Left arm, Patient Position: Sitting)   Pulse 80   Temp (!) 97 4 °F (36 3 °C) (Tympanic)   Ht 4' 11" (1 499 m)   Wt 65 5 kg (144 lb 6 4 oz)   LMP  (LMP Unknown)   SpO2 97%   BMI 29 17 kg/m²   General: Pleasant, Awake alert and oriented x 3, conversant without conversational dyspnea, NAD, normal affect  HEENT:  PERRL, Sclera noninjected, nonicteric OU, Nares patent,  no craniofacial abnormalities, Mucous membranes, moist, no oral lesions, normal dentition, Mallampati class 3  NECK: Trachea midline, no accessory muscle use, no stridor, no cervical or supraclavicular adenopathy, JVP not elevated  CARDIAC: Reg, single s1/S2, no m/r/g  PULM: CTA bilaterally no wheezing, rhonchi or rales  ABD: Normoactive bowel sounds, soft nontender, nondistended, no rebound, no rigidity, no guarding  EXT: No cyanosis, no clubbing, no edema, normal capillary refill  NEURO: no focal neurologic deficits, AAOx3, moving all extremities appropriately      Diagnostic Data:  Labs: I personally reviewed the most recent laboratory data pertinent to today's visit  I have personally reviewed pertinent lab results  Lab Results   Component Value Date    WBC 5 87 07/18/2021    HGB 13 2 07/18/2021    HCT 40 6 07/18/2021    MCV 93 07/18/2021     07/18/2021     Lab Results   Component Value Date    GLUCOSE 91 08/07/2015    CALCIUM 9 6 07/18/2021     08/07/2015    K 4 2 07/18/2021    CO2 28 07/18/2021     07/18/2021    BUN 26 (H) 07/18/2021    CREATININE 1 21 07/18/2021     No results found for: IGE  Lab Results   Component Value Date    ALT 19 07/18/2021    AST 18 07/18/2021    ALKPHOS 91 07/18/2021    BILITOT 0 97 08/07/2015     Lab Results   Component Value Date    IRON 59 04/29/2020    TIBC 332 04/29/2020    FERRITIN 32 04/29/2020     Lab Results   Component Value Date    IRVJIPHM43 354 02/04/2021     Lab Results   Component Value Date    FOLATE 11 1 02/04/2021          Imaging:  I personally reviewed the images on the [facility] system pertinent to today's visit  CT chest 8/14/19  IMPRESSION:     Linear opacity at the right lung apex recommended for follow-up on the 1/28/2019 chest CT does not have masslike appearance, and is stable dating back to 11/19/2018   This is consistent with subpleural scarring (series 2 image 9 )     No evidence of malignancy elsewhere in the chest      CT chest - 11/19/18 - IMPRESSION:  1   Focal 9 7 mm (mean diameter) nodular opacity at the right lung apex is new from priors  non-tender. Bowel sounds normal. No masses,  No organomegaly. Extremities: Extremities normal, atraumatic, no cyanosis. 2+ pitting edema up to mid calf. Capillary refill normal.   Pulses: 2+ and symmetric all extremities. Skin: Skin color pink, turgor normal. No rashes or lesions   Neurologic: L sided weakness due to prior CVA. A&Ox1 (person). Labs Reviewed: All lab results for the last 24 hours reviewed. Recent Results (from the past 24 hour(s))   POC LACTIC ACID    Collection Time: 08/07/19  4:34 AM   Result Value Ref Range    Lactic Acid (POC) 0.51 0.40 - 3.21 mmol/L   METABOLIC PANEL, COMPREHENSIVE    Collection Time: 08/07/19  4:35 AM   Result Value Ref Range    Sodium 146 (H) 136 - 145 mmol/L    Potassium 4.5 3.5 - 5.5 mmol/L    Chloride 111 100 - 111 mmol/L    CO2 31 21 - 32 mmol/L    Anion gap 4 3.0 - 18 mmol/L    Glucose 101 (H) 74 - 99 mg/dL    BUN 38 (H) 7.0 - 18 MG/DL    Creatinine 1.99 (H) 0.6 - 1.3 MG/DL    BUN/Creatinine ratio 19 12 - 20      GFR est AA 39 (L) >60 ml/min/1.73m2    GFR est non-AA 32 (L) >60 ml/min/1.73m2    Calcium 8.5 8.5 - 10.1 MG/DL    Bilirubin, total 0.5 0.2 - 1.0 MG/DL    ALT (SGPT) 15 (L) 16 - 61 U/L    AST (SGOT) 18 10 - 38 U/L    Alk. phosphatase 109 45 - 117 U/L    Protein, total 7.0 6.4 - 8.2 g/dL    Albumin 2.6 (L) 3.4 - 5.0 g/dL    Globulin 4.4 (H) 2.0 - 4.0 g/dL    A-G Ratio 0.6 (L) 0.8 - 1.7     CBC WITH AUTOMATED DIFF    Collection Time: 08/07/19  4:35 AM   Result Value Ref Range    WBC 8.1 4.6 - 13.2 K/uL    RBC 3.40 (L) 4.70 - 5.50 M/uL    HGB 9.5 (L) 13.0 - 16.0 g/dL    HCT 31.0 (L) 36.0 - 48.0 %    MCV 91.2 74.0 - 97.0 FL    MCH 27.9 24.0 - 34.0 PG    MCHC 30.6 (L) 31.0 - 37.0 g/dL    RDW 14.8 (H) 11.6 - 14.5 %    PLATELET 913 507 - 161 K/uL    MPV 11.2 9.2 - 11.8 FL    NEUTROPHILS 82 (H) 40 - 73 %    LYMPHOCYTES 10 (L) 21 - 52 %    MONOCYTES 6 3 - 10 %    EOSINOPHILS 2 0 - 5 %    BASOPHILS 0 0 - 2 %    ABS. NEUTROPHILS 6.7 1.8 - 8.0 K/UL    ABS. LYMPHOCYTES 0.8 (L) 0.9 - 3.6 K/UL    ABS. MONOCYTES 0.5 0.05 - 1.2 K/UL    ABS. EOSINOPHILS 0.1 0.0 - 0.4 K/UL    ABS. BASOPHILS 0.0 0.0 - 0.1 K/UL    DF AUTOMATED     NT-PRO BNP    Collection Time: 08/07/19  4:35 AM   Result Value Ref Range    NT pro-BNP 2,088 (H) 0 - 1,800 PG/ML   D DIMER    Collection Time: 08/07/19  4:35 AM   Result Value Ref Range    D DIMER 1.61 (H) <0.46 ug/ml(FEU)   CULTURE, BLOOD    Collection Time: 08/07/19  4:35 AM   Result Value Ref Range    Special Requests: NO SPECIAL REQUESTS      Culture result: NO GROWTH AFTER 1 HOUR     LIPASE    Collection Time: 08/07/19  4:35 AM   Result Value Ref Range    Lipase 52 (L) 73 - 393 U/L   MAGNESIUM    Collection Time: 08/07/19  4:35 AM   Result Value Ref Range    Magnesium 2.3 1.6 - 2.6 mg/dL   CARDIAC PANEL,(CK, CKMB & TROPONIN)    Collection Time: 08/07/19  4:35 AM   Result Value Ref Range    CK 56 39 - 308 U/L    CK - MB 1.1 <3.6 ng/ml    CK-MB Index 2.0 0.0 - 4.0 %    Troponin-I, QT 0.05 (H) 0.0 - 0.045 NG/ML   HEMOGLOBIN A1C W/O EAG    Collection Time: 08/07/19  4:35 AM   Result Value Ref Range    Hemoglobin A1c 7.1 (H) 4.2 - 5.6 %   POC G3    Collection Time: 08/07/19  4:41 AM   Result Value Ref Range    Device: Non rebreather      Flow rate (POC) 15 L/M    FIO2 (POC) 1.0 %    pH (POC) 7.351 7.35 - 7.45      pCO2 (POC) 54.6 (H) 35.0 - 45.0 MMHG    pO2 (POC) 301 (H) 80 - 100 MMHG    HCO3 (POC) 30.6 (H) 22 - 26 MMOL/L    sO2 (POC) 100 (H) 92 - 97 %    Base excess (POC) 5 mmol/L    Allens test (POC) N/A      Total resp.  rate 26      Site RIGHT RADIAL      Patient temp. 96.1      Specimen type (POC) ARTERIAL      Performed by [facility]    EKG, 12 LEAD, INITIAL    Collection Time: 08/07/19  4:41 AM   Result Value Ref Range    Ventricular Rate 86 BPM    Atrial Rate 86 BPM    P-R Interval 344 ms    QRS Duration 150 ms    Q-T Interval 440 ms    QTC Calculation (Bezet) 526 ms    Calculated P Axis -7 degrees    Calculated R Axis -24 degrees  This could represent atelectasis (favored by angular morphology) or focal infectious process   Recommend follow-up chest CT in 3 months to document resolution      2   Borderline enlarged right hilar and subcarinal mediastinal lymph nodes   Assuming the patient has no clinical or laboratory features concerning for recurrence of malignancy or lymphoproliferative disorder, these can also be followed up in 3 months      3   No acute abdominopelvic findings      4   No central pulmonary emboli demonstrated to the lobar levels         Cardiac studies:  Echo -  8/7/17  SUMMARY  LEFT VENTRICLE:  Systolic function was at the lower limits of normal  Ejection fraction was estimated to be 53 %  Although no diagnostic regional wall motion abnormality was identified, this possibility cannot be completely excluded on the basis of this study  Doppler parameters were consistent with abnormal left ventricular relaxation (grade 1 diastolic dysfunction)      MITRAL VALVE:  There was trace regurgitation      TRICUSPID VALVE:  There was trace regurgitation      Sleep studies:  Home study  Mrs Kelley Wray had an increased number of sleep related respiratory events (ELHAM - 54 6) which was consistent with severe  obstructive sleep apnea  In addition, she had significant hypoxia with 19% of the night with saturation below 90%, 5% of the  night with saturation below 5% and lowest sat of 75%      CPAP study  Mrs Kelley Wray underwent titration of CPAP to 19 cc of H2O pressure  However, she was still noted to have a few events and  snoring at this pressure  Therefore, I recommend a trial of auto-titrating BiPAP with min EPAP of 18, Max IPAP of 25 and  PSV of 4 with Cflex 1, heated humidity and using a small Resmed Airfit F20 interface  Compliance should be evaluated in  1-2 months  In addition, she had an increased number of limb movements (PLM index - 27)   If daytime sleepiness persists after  treatment of JABARI, pharmacologic treatment should be Calculated T Axis 125 degrees    Diagnosis       Sinus rhythm with sinus arrhythmia with 1st degree AV block  Left bundle branch block  Abnormal ECG  No previous ECGs available     URINALYSIS W/ RFLX MICROSCOPIC    Collection Time: 08/07/19  5:20 AM   Result Value Ref Range    Color DARK YELLOW      Appearance CLOUDY      Specific gravity 1.022 1.005 - 1.030      pH (UA) 5.5 5.0 - 8.0      Protein >1,000 (A) NEG mg/dL    Glucose NEGATIVE  NEG mg/dL    Ketone NEGATIVE  NEG mg/dL    Bilirubin SMALL (A) NEG      Blood MODERATE (A) NEG      Urobilinogen 1.0 0.2 - 1.0 EU/dL    Nitrites POSITIVE (A) NEG      Leukocyte Esterase TRACE (A) NEG     URINE MICROSCOPIC ONLY    Collection Time: 08/07/19  5:20 AM   Result Value Ref Range    WBC 5 to 10 0 - 5 /hpf    RBC TOO NUMEROUS TO COUNT 0 - 5 /hpf    Epithelial cells NEGATIVE  0 - 5 /lpf    Bacteria 1+ (A) NEG /hpf    Mucus FEW (A) NEG /lpf       Results   XR CHEST PORT (Accession [id number]) (Order [id number])   Allergies      Not Specified: Angelica Messenger;  Pork Derived (Porcine); Tomato   Exam Information     Status Exam Begun  Exam Ended    Final [99] 8/07/2019 04:47 8/07/2019 05:14   Result Information     Status: Final result (Exam End: 8/7/2019 05:14) Provider Status: Open   Study Result     ---------------------------------------------------------------------------  <<<<<<<<<           [facility]           >>>>>>>>>   ---------------------------------------------------------------------------     CLINICAL HISTORY:  Shortness of breath.     COMPARISON EXAMINATIONS:  None.        ---  SINGLE FRONTAL VIEW OF THE CHEST  ---     The lung volumes are low. The cardiomediastinal silhouette is grossly within  normal limits. There is apparent pulmonary vascular congestion, diffuse  increased markings and bilateral mid to lower lung field infiltrates. No  significant osseous abnormalities are identified. considered      New Compliance Data:  7/31/21- 8/29/21  Type of CPAP:  AutoBIPAP min EPAP 12, PSV 4, IPAP 25     Mean EPAP 13 0, max EPAP 15 9,  Min IPAP 17, max EPAP 20                                   Percent usage: 73%, 63%                                    Average time used: 4 hrs 6 mins                                  Time in large leak: 1 hr 41 mins                                                     Residual AHI:10 7, CA 3 1    Compliance Data:  6/20/20- 7/19/20  Type of CPAP:  AutoBIPAP min EPAP 12, PSV 4, IPAP 25     Mean EPAP 13 9, max EPAP 19,  Min IPAP 18, max EPAP 23                                   Percent usage: 93%, 77%                                    Average time used: 5 hrs 53 mins                                  Time in large leak: 1 hr 7 mins                                                     Residual AHI:13 6, CA 4     Compliance Data:  2/12/20- 3/12/20  Type of CPAP:  AutoBIPAP min EPAP 12, PSV 4, IPAP 25     Mean EPAP 14 1, max EPAP 20,  Min IPAP 18, max EPAP 24                                   Percent usage: 90%, 76%                                    Average time used: 5 hrs 20 mins                                  Time in large leak: 1 hr 17 mins                                                     Residual AHI:14 6     Compliance Data:  10/23/19- 11/21/19  Type of CPAP:  AutoBIPAP min EPAP 12, PSV 4, IPAP 25     Mean EPAP 14 2, max EPAP 19 1,  Min IPAP 18, max EPAP 23                                   Percent usage: 100%                                   Average time used: 5 hrs 20 mins                                  Time in large leak: 1 hr 27 mins                                                     Residual AHI:17 6     Compliance Data:  Type of CPAP:  AutoBIPAP min EPAP 12, PSV 4, IPAP 25   Mean EPAP 12 7, max EPAP 17,  Min IPAP 17, max EPAP 21                                   Percent usage: 97%                                   Average time used: 3 hrs 46 mins                                  Time in large leak: 1 hr 14 mins                                                                 Toy Cohen, DO       --------------  IMPRESSION  Impression:  --------------     There is apparent pulmonary vascular congestion, diffuse increased markings and  bilateral mid to lower lung field infiltrates which suggests interstitial and  pulmonary edema. Bilateral pneumonia is also a possibility. Correlation is  needed.

## 2024-04-25 NOTE — PROGRESS NOTES
[facility]  Pulmonary, Critical Care, and Sleep Medicine    Name: Olivia Arevalo MRN: [id number]   : 1935 Hospital: [facility] [place]   Date: 10/18/2019        Pulmonary Critical Care Note    IMPRESSION:   Patient Active Problem List   Diagnosis Code    Acute on chronic hypercapnic hypoxic respiratory failure J96.21, J96.22    Acute on chronic combined systolic and diastolic CHF (congestive heart failure) ([facility]) I50.43    NSTEMI (non-ST elevated myocardial infarction) ([facility] I21.4    Pulmonary infiltrates - pulmonary edema vs pneumonia     UTI (urinary tract infection) N39.0    Leukocytosis     CKD (chronic kidney disease) stage 3, GFR 30-59 ml/min ([facility]) N18.3    Anemia     History of CVA (cerebrovascular accident) Z86.73    Insulin dependent diabetes mellitus with complications ([facility] X32.2, Z79.4    Hypertension I10    Acute encephalopathy, resolved, back to baseline mental status now.  Dementia ([facility] F03.90   Hypoglycemia, due to poor PO intake. Hx of PE, on Eliquis     Full code. RECOMMENDATIONS:   Respi:  Used bipap overnight. Now on 2 LPM.  Keep bipap Qhs and PRN for now when being transferred to medical floor. May change to PRN from tomorrow if respi status stable. Titrate fio2 to keep spo2 >=92%  Aspiration precautions. HOB >30. IS, OOB, PT, OT. Cardio: BP stable. CHF on cxr, improving with lasix. Trop max 1.76  C/w lasix. cxr improving. Strict I/O.  BP stable on norvasc, hydralazine. C/w Imdur  C/w Eliquis for Hx of PE and high risk for VTE with bed bound status. PVL LE negative 10/7/19. Echo pending  Follow trop  Shane Melgar on board. ID:  Temp max 100.3  WBC improved. CXR improving. Likely aspiration pneumonia vs CHF. UA abnormal.   Urine cx pending. Blood cx NGTD  Can't collect sputum cx  D/c vanco (with renal insufficiency). C/w zosyn. Depending on urine cx and the aspiration pneumonia response, may change the abx to PO in 1-2 days. Oral thrush, d/w pharmacist, will treat with nystatin swiss and spit. Endo:   Had hypoglycemia due to poor PO intake. On D5 at 50 ml/hr. C/w SSI  Started diet pet ST eval today. Neuro: CVA with left hemiparesis, bed bound status at NH.   CT head nil acute. Ammonia normal 22. Mental status at baseline now. Alert awake and talking    Renal: CKD, creat slightly up. Monitor renal function and electrolyte as pt is on lasix. Electrolytes: replace as needed per ICU protocol. GI: no acute issues. Diet as below    Nutrition: d/w ST, started on pureed and nector (baseline diet was pureed and thin)    PT, OT, IS. OOB. Palliative care consulted  Dos Santos Bundle Followed . On dos santos for strict I/O. Remove dos santos as soon as not needed. Will defer respective systems problem management to primary and other consultant and follow patient in ICU with primary and other medical team  Further recommendations will be based on the patient's response to recommended treatment and results of the investigation ordered. Quality Care: PPI, DVT prophylaxis, HOB elevated, Infection control all reviewed and addressed. PAIN AND SEDATION: none  · Skin/Wound: multiple small skin tears   · Nutrition: NPO  · Prophylaxis: DVT [Eliquis] and GI [PPI] Prophylaxis reviewed. · Restraints: none  · PT/OT eval and treat: as needed   · Lines/Tubes: lines PIV; dos santos 10/16/19  ADVANCE DIRECTIVE: Full code. DISCUSSION: spoke with RN, RT, ICU staff  Events and notes from last 24 hours reviewed. Care plan discussed with nursing     Transfer to tele. Once transferred, PCCM will follow from pulmonary standpoint only. Call us with any questions. Subjective/History:   Mr. Sylvester Nam has been seen and evaluated as Dr. Pat Douglas requested now for assisting with Acute on chronic hypercapnic hypoxic respiratory failure on BiPAP. The patient can not provide additional history due to dementia, unable to comprehend. Patient is a 81 y.o. male with following PMHx presented to ER from local NH after found with progressive respiratory distress and obtundation. EMS brought patient on NRB. In ER, ABG showed mild acute on chronic hypercapnia started on BiPAP. CXR showed some pulmonary edema like changes. Pro-BNP and troponin were elevated. He was admitted for CHF exacerbation and respiratory failure requiring BiPAP. Other information is limited. 10/18/2019   Remained in icu. Used bipap overnight, now on NC  Temp max 100.3  BP stable  Mental status back to baseline alert awake and following commands  No cp n v dyspnea   Mild occ cough, no sputum production. Left leg and hand swelling +  cxr improving  No other issues overnight. Review of Systems:  Review of systems not obtained due to patient factors. Latest lactic acid:   Lactic acid   Date Value Ref Range Status   10/17/2019 0.5 0.4 - 2.0 MMOL/L Final       Past Medical History:  Past Medical History:   Diagnosis Date    CAD (coronary artery disease)     Chronic kidney disease     D[facility]     Diabetes ([facility]     Hypertension     Stroke [facility])         Past Surgical History:  History reviewed. No pertinent surgical history. Medications:  Prior to Admission medications    Medication Sig Start Date End Date Taking? Authorizing Provider   brimonidine (ALPHAGAN) 0.2 % ophthalmic solution Administer 1 Drop to both eyes two (2) times a day. 8/10/19   Zuleika Raymundo MD   isosorbide mononitrate ER (IMDUR) 60 mg CR tablet Take 1 Tab by mouth daily. 8/11/19   Zuleika Raymundo MD   amoxicillin-clavulanate (AUGMENTIN) 500-125 mg per tablet Take 1 Tab by mouth daily. 8/10/19   Zuleika Raymundo MD   furosemide (LASIX) 20 mg tablet Take 1 Tab by mouth every other day. 8/10/19   Zuleika Raymundo MD   acetaminophen (TYLENOL) 650 mg TbER Take 650 mg by mouth every eight (8) hours. Lanette, MD Michael   albuterol (PROVENTIL VENTOLIN) 2.5 mg /3 mL (0.083 %) nebu by Nebulization route.     Michael Gama MD   alfuzosin SR (UROXATRAL) 10 mg SR tablet Take  by mouth daily. Michael Gama MD   amLODIPine (NORVASC) 5 mg tablet Take 5 mg by mouth daily. Michael Gama MD   atorvastatin (LIPITOR) 40 mg tablet Take 40 mg by mouth daily. Michael Gama MD   apixaban (ELIQUIS) 2.5 mg tablet Take 2.5 mg by mouth two (2) times a day. Michael Gama MD   hydrALAZINE (APRESOLINE) 50 mg tablet Take 25 mg by mouth three (3) times daily. Michael Gama MD   pregabalin (LYRICA) 75 mg capsule Take 75 mg by mouth. Michael Gama MD   insulin aspart U-100 (NOVOLOG) 100 unit/mL injection by SubCUTAneous route Before breakfast, lunch, dinner and at bedtime. Michael Gama MD   raNITIdine (ZANTAC) 150 mg tablet Take 150 mg by mouth two (2) times a day. Michael Gama MD   montelukast (SINGULAIR) 10 mg tablet Take 10 mg by mouth daily. Michael Gama MD   insulin glargine U-300 conc (TOUJEO SOLOSTAR U-300 INSULIN) 300 unit/mL (1.5 mL) inpn pen by SubCUTAneous route daily.     Michael Gama MD       Current Facility-Administered Medications   Medication Dose Route Frequency    dextrose 5% infusion  50 mL/hr IntraVENous CONTINUOUS    alfuzosin SR (UROXATRAL) tablet 10 mg  10 mg Oral DAILY    amLODIPine (NORVASC) tablet 5 mg  5 mg Oral DAILY    atorvastatin (LIPITOR) tablet 40 mg  40 mg Oral DAILY    isosorbide mononitrate ER (IMDUR) tablet 60 mg  60 mg Oral DAILY    apixaban (ELIQUIS) tablet 2.5 mg  2.5 mg Oral BID    piperacillin-tazobactam (ZOSYN) 3.375 g in 0.9% sodium chloride (MBP/ADV) 100 mL MBP  3.375 g IntraVENous Q8H    hydrALAZINE (APRESOLINE) 20 mg/mL injection 20 mg  20 mg IntraVENous Q6H    furosemide (LASIX) injection 40 mg  40 mg IntraVENous BID    Vancomycin - Pharmacy to Dose  1 Each Other Rx Dosing/Monitoring    vancomycin (VANCOCIN) 1,250 mg in 0.9% sodium chloride 250 mL (VIAL-MATE)  1,250 mg IntraVENous Q24H    sodium chloride (NS) flush 5-40 mL  5-40 mL IntraVENous Q8H    pantoprazole (PROTONIX) 40 mg in sodium chloride 0.9% 10 mL injection  40 mg IntraVENous DAILY    insulin lispro (HUMALOG) injection   SubCUTAneous Q6H       Allergy:  Allergies   Allergen Reactions    [place] Juice Unknown (comments)    Pork Derived (Porcine) Unknown (comments)    Tomato Unknown (comments)     Pt unable to answer, dementia          Social History:  Social History     Tobacco Use    Smoking status: Unknown If Ever Smoked   Substance Use Topics    Alcohol use: Not Currently    Drug use: Not on file        Family History:  History reviewed. No pertinent family history. Objective:   Vital Signs:    Blood pressure 136/69, pulse 98, temperature 100 °F (37.8 °C), resp. rate 25, weight 102.1 kg (225 lb), SpO2 98 %. Body mass index is 34.21 kg/m². O2 Device: Nasal cannula   O2 Flow Rate (L/min): 2 l/min   Temp (24hrs), Av.3 °F (37.4 °C), Min:98.9 °F (37.2 °C), Max:100 °F (37.8 °C)         Intake/Output:   Last shift:      10/18 0701 - 10/18 1900  In: 354.2 [I.V.:354.2]  Out: -   Last 3 shifts: 10/16 1901 - 10/18 0700  In: -   Out: 3535 [Urine:3550]    Intake/Output Summary (Last 24 hours) at 10/18/2019 0858  Last data filed at 10/18/2019 0715  Gross per 24 hour   Intake 354.17 ml   Output 2050 ml   Net -1695.83 ml       Physical Exam:  General: alert awake and following commands. NAD. HEENT: PERRL  Neck: No abnormally enlarged lymph nodes or thyroid, supple  Chest: normal  Lungs: moderate air entry, no rhonchi. No wheezing. breathing normal , normal percussion bilaterally, no tenderness/ rash  Heart: Regular rate and rhythm, S1S2 present or without murmur or extra heart sounds  Abdomen: non distended, bowel sounds normoactive, tympanic, abdomen is soft without significant tenderness, masses, organomegaly or guarding, rigidity, rebound  Extremity: 1+, 2+, pitting on L leg and hand. No edema on right.    Capillary refill: normal  Neuro: responds to voice, moves RUE and RLE extremities, weak on L side, no involuntary movements, exam limitations  Skin: Skin color, texture, turgor fair. Skin dry, warm, non-diaphoretic    Data:     Recent Results (from the past 24 hour(s))   POC G3    Collection Time: 10/17/19 10:29 AM   Result Value Ref Range    Device: BIPAP      FIO2 (POC) 35 %    pH (POC) 7.400 7.35 - 7.45      pCO2 (POC) 51.3 (H) 35.0 - 45.0 MMHG    pO2 (POC) 83 80 - 100 MMHG    HCO3 (POC) 31.8 (H) 22 - 26 MMOL/L    sO2 (POC) 96 92 - 97 %    Base excess (POC) 7 mmol/L    PEEP/CPAP (POC) 5 cmH2O    PIP (POC) 16      Allens test (POC) YES      Total resp.  rate 22      Site RIGHT RADIAL      Specimen type (POC) ARTERIAL      Performed by Jeanna Knox     Spontaneous timed YES     GLUCOSE, POC    Collection Time: 10/17/19 12:01 PM   Result Value Ref Range    Glucose (POC) 56 (L) 70 - 110 mg/dL   GLUCOSE, POC    Collection Time: 10/17/19 12:51 PM   Result Value Ref Range    Glucose (POC) 120 (H) 70 - 110 mg/dL   CARDIAC PANEL,(CK, CKMB & TROPONIN)    Collection Time: 10/17/19  3:12 PM   Result Value Ref Range    CK 98 39 - 308 U/L    CK - MB 4.9 (H) <3.6 ng/ml    CK-MB Index 5.0 (H) 0.0 - 4.0 %    Troponin-I, QT 1.76 (HH) 0.0 - 0.045 NG/ML   GLUCOSE, POC    Collection Time: 10/17/19  5:34 PM   Result Value Ref Range    Glucose (POC) 63 (L) 70 - 110 mg/dL   GLUCOSE, POC    Collection Time: 10/17/19  6:03 PM   Result Value Ref Range    Glucose (POC) 152 (H) 70 - 110 mg/dL   GLUCOSE, POC    Collection Time: 10/17/19 11:52 PM   Result Value Ref Range    Glucose (POC) 79 70 - 110 mg/dL   CBC WITH AUTOMATED DIFF    Collection Time: 10/18/19  4:10 AM   Result Value Ref Range    WBC 9.5 4.6 - 13.2 K/uL    RBC 3.13 (L) 4.70 - 5.50 M/uL    HGB 8.6 (L) 13.0 - 16.0 g/dL    HCT 28.1 (L) 36.0 - 48.0 %    MCV 89.8 74.0 - 97.0 FL    MCH 27.5 24.0 - 34.0 PG    MCHC 30.6 (L) 31.0 - 37.0 g/dL    RDW 15.4 (H) 11.6 - 14.5 %    PLATELET 852 (L) 758 - 420 K/uL    MPV 11.8 9.2 - 11.8 FL    NEUTROPHILS 77 (H) 40 - 73 %    LYMPHOCYTES 11 (L) 21 - 52 % MONOCYTES 10 3 - 10 %    EOSINOPHILS 2 0 - 5 %    BASOPHILS 0 0 - 2 %    ABS. NEUTROPHILS 7.3 1.8 - 8.0 K/UL    ABS. LYMPHOCYTES 1.0 0.9 - 3.6 K/UL    ABS. MONOCYTES 1.0 0.05 - 1.2 K/UL    ABS. EOSINOPHILS 0.2 0.0 - 0.4 K/UL    ABS.  BASOPHILS 0.0 0.0 - 0.1 K/UL    DF AUTOMATED     METABOLIC PANEL, BASIC    Collection Time: 10/18/19  4:10 AM   Result Value Ref Range    Sodium 143 136 - 145 mmol/L    Potassium 3.8 3.5 - 5.5 mmol/L    Chloride 105 100 - 111 mmol/L    CO2 33 (H) 21 - 32 mmol/L    Anion gap 5 3.0 - 18 mmol/L    Glucose 95 74 - 99 mg/dL    BUN 50 (H) 7.0 - 18 MG/DL    Creatinine 2.64 (H) 0.6 - 1.3 MG/DL    BUN/Creatinine ratio 19 12 - 20      GFR est AA 28 (L) >60 ml/min/1.73m2    GFR est non-AA 23 (L) >60 ml/min/1.73m2    Calcium 8.5 8.5 - 10.1 MG/DL   AMMONIA    Collection Time: 10/18/19  4:10 AM   Result Value Ref Range    Ammonia 22 11 - 32 UMOL/L   GLUCOSE, POC    Collection Time: 10/18/19  6:16 AM   Result Value Ref Range    Glucose (POC) 108 70 - 110 mg/dL           Recent Labs     10/17/19  1029 10/16/19  2147   FIO2I 35 100   HCO3I 31.8* 29.8*   PCO2I 51.3* 54.0*   PHI 7.400 7.348*   PO2I 83 156*       All Micro Results     Procedure Component Value Units Date/Time    CULTURE, BLOOD [[id number]] Collected:  10/16/19 2130    Order Status:  Completed Specimen:  Blood Updated:  10/18/19 0554     Special Requests: NO SPECIAL REQUESTS        Culture result: NO GROWTH 2 DAYS       CULTURE, BLOOD [[id number]] Collected:  10/16/19 2125    Order Status:  Completed Specimen:  Blood Updated:  10/18/19 0554     Special Requests: NO SPECIAL REQUESTS        Culture result: NO GROWTH 2 DAYS       CULTURE, URINE [[id number]] Collected:  10/17/19 1100    Order Status:  Completed Specimen:  Urine from [place] Specimen Updated:  10/17/19 1446    CULTURE, RESPIRATORY/SPUTUM/BRONCH [place] STAIN [[id number]]     Order Status:  Sent Specimen:  Sputum           Telemetry: normal sinus rhythm    8/7/19 ECHO  · Left Ventricle: Normal cavity size and systolic function (ejection fraction normal). Mild concentric hypertrophy. The estimated ejection fraction is 51 - 55%. Abnormal wall motion as described on the wall scoring diagram below. Unable to assess diastolic function. E/E' ratio is 7.78. Wall Scoring: The following segments are hypokinetic: basal inferior, basal inferolateral, mid inferior and apical inferior. All other segments are normal.  · Left Atrium: Mildly dilated left atrium. · Tricuspid Valve: Tricuspid valve not well visualized. No stenosis. Tricuspid regurgitation is inadequate for estimation of right ventricular systolic pressure. There is no evidence of pulmonary hypertension    Imaging:  [x]I have personally reviewed the patients chest radiographs images and report     Results from Hospital Encounter encounter on 10/16/19   XR CHEST PORT    Narrative EXAM: XR CHEST PORT    CLINICAL INDICATION/HISTORY: CHF follow up  -Additional: None    COMPARISON: Several prior exams, most recently 10/16/2019    TECHNIQUE: Frontal view of the chest    _______________    FINDINGS:    HEART AND MEDIASTINUM: Stable cardiac size and mediastinal contours. LUNGS AND PLEURAL SPACES: Improved aeration of the right mid and bilateral lower  lung zones noted. No pneumothorax. Left retrocardiac opacity similar to prior  avel background of mildly underexpanded lungs. Small bilateral pleural  effusions suspected, as previously. BONY THORAX AND SOFT TISSUES: No acute osseous abnormality    _______________      Impression IMPRESSION:      1. Underexpanded lungs and left retrocardiac atelectasis similar to prior with  improved interstitial and alveolar edema. Suspect small bilateral pleural  effusions without significant change. Results from [place] encounter on 10/16/19   CT HEAD WO CONT    Narrative EXAM: CT head    INDICATION: Left-sided weakness    COMPARISON: None.     TECHNIQUE: Axial CT imaging of the head was performed without intravenous  contrast. One or more dose reduction techniques were used on this CT: automated  exposure control, adjustment of the mAs and/or kVp according to patient size,  and iterative reconstruction techniques. The specific techniques used on this  CT exam have been documented in the patient's electronic medical record. Digital  Imaging and Communications in Medicine (DICOM) format image data are available  to nonaffiliated external healthcare facilities or entities on a secure, media  free, reciprocally searchable basis with patient authorization for at least a  12-month period after this study. _______________    FINDINGS:    BRAIN AND POSTERIOR FOSSA: There is age-appropriate atrophy. There is no  intracranial hemorrhage, mass effect, or midline shift. There is an old left  cerebellar hemispheric infarct with encephalomalacia. Is also an old left  occipital lobe infarct with encephalomalacia. Significant small vessel ischemic  disease present. EXTRA-AXIAL SPACES AND MENINGES: There are no abnormal extra-axial fluid  collections. CALVARIUM: Intact. SINUSES: Clear. OTHER: None.    _______________      Impression IMPRESSION:    No acute intracranial abnormalities. Atrophy with small vessel ischemic disease and old infarcts. PVL LE 10/18/19:  · No evidence of deep vein thrombosis in the right lower extremity veins assessed. · No evidence of deep vein thrombosis in the left lower extremity veins assessed.         [x]See my orders for details    My assessment, plan of care, findings, medications, side effects etc were discussed with:  [x]nursing []PT/OT    [x]respiratory therapy []   [x]family []Patient       Katelyn Marquez MD  10/18/2019 Strong peripheral pulses/Capillary refill less/equal to 2 seconds